# Patient Record
Sex: MALE | Race: WHITE | Employment: FULL TIME | URBAN - METROPOLITAN AREA
[De-identification: names, ages, dates, MRNs, and addresses within clinical notes are randomized per-mention and may not be internally consistent; named-entity substitution may affect disease eponyms.]

---

## 2017-01-03 ENCOUNTER — TELEPHONE (OUTPATIENT)
Dept: FAMILY MEDICINE CLINIC | Age: 57
End: 2017-01-03

## 2017-01-06 ENCOUNTER — OFFICE VISIT (OUTPATIENT)
Dept: FAMILY MEDICINE CLINIC | Age: 57
End: 2017-01-06

## 2017-01-06 VITALS
SYSTOLIC BLOOD PRESSURE: 118 MMHG | OXYGEN SATURATION: 98 % | BODY MASS INDEX: 29.43 KG/M2 | WEIGHT: 217 LBS | DIASTOLIC BLOOD PRESSURE: 80 MMHG | HEART RATE: 88 BPM

## 2017-01-06 DIAGNOSIS — R10.30 LOWER ABDOMINAL PAIN: ICD-10-CM

## 2017-01-06 DIAGNOSIS — F41.9 ANXIETY: Primary | ICD-10-CM

## 2017-01-06 DIAGNOSIS — F51.01 PRIMARY INSOMNIA: ICD-10-CM

## 2017-01-06 PROCEDURE — 99214 OFFICE O/P EST MOD 30 MIN: CPT | Performed by: FAMILY MEDICINE

## 2017-01-06 ASSESSMENT — PATIENT HEALTH QUESTIONNAIRE - PHQ9
SUM OF ALL RESPONSES TO PHQ QUESTIONS 1-9: 2
SUM OF ALL RESPONSES TO PHQ9 QUESTIONS 1 & 2: 2
1. LITTLE INTEREST OR PLEASURE IN DOING THINGS: 1
2. FEELING DOWN, DEPRESSED OR HOPELESS: 1

## 2017-01-06 ASSESSMENT — ENCOUNTER SYMPTOMS: RESPIRATORY NEGATIVE: 1

## 2017-01-09 ENCOUNTER — TELEPHONE (OUTPATIENT)
Dept: FAMILY MEDICINE CLINIC | Age: 57
End: 2017-01-09

## 2017-01-09 RX ORDER — DEXTROMETHORPHAN HYDROBROMIDE AND PROMETHAZINE HYDROCHLORIDE 15; 6.25 MG/5ML; MG/5ML
5 SYRUP ORAL 4 TIMES DAILY PRN
Qty: 180 ML | Refills: 0 | Status: SHIPPED | OUTPATIENT
Start: 2017-01-09 | End: 2017-01-16

## 2017-01-11 ENCOUNTER — TELEPHONE (OUTPATIENT)
Dept: FAMILY MEDICINE CLINIC | Age: 57
End: 2017-01-11

## 2017-01-12 ENCOUNTER — TELEPHONE (OUTPATIENT)
Dept: FAMILY MEDICINE CLINIC | Age: 57
End: 2017-01-12

## 2017-01-12 DIAGNOSIS — R10.30 LOWER ABDOMINAL PAIN: Primary | ICD-10-CM

## 2017-01-13 DIAGNOSIS — R10.30 LOWER ABDOMINAL PAIN: Primary | ICD-10-CM

## 2017-01-30 RX ORDER — GLIPIZIDE 5 MG/1
TABLET ORAL
Qty: 270 TABLET | Refills: 0 | Status: SHIPPED | OUTPATIENT
Start: 2017-01-30 | End: 2017-02-27 | Stop reason: SDUPTHER

## 2017-01-30 RX ORDER — DIAZEPAM 10 MG/1
TABLET ORAL
Qty: 40 TABLET | Refills: 1 | Status: SHIPPED | OUTPATIENT
Start: 2017-01-30 | End: 2017-04-22 | Stop reason: SDUPTHER

## 2017-01-31 ENCOUNTER — TELEPHONE (OUTPATIENT)
Dept: FAMILY MEDICINE CLINIC | Age: 57
End: 2017-01-31

## 2017-01-31 RX ORDER — AZITHROMYCIN 250 MG/1
TABLET, FILM COATED ORAL
Qty: 6 TABLET | Refills: 0 | Status: SHIPPED | OUTPATIENT
Start: 2017-01-31 | End: 2017-02-10 | Stop reason: ALTCHOICE

## 2017-02-01 ENCOUNTER — TELEPHONE (OUTPATIENT)
Dept: FAMILY MEDICINE CLINIC | Age: 57
End: 2017-02-01

## 2017-02-01 DIAGNOSIS — R10.84 GENERALIZED ABDOMINAL PAIN: Primary | ICD-10-CM

## 2017-02-03 ENCOUNTER — HOSPITAL ENCOUNTER (OUTPATIENT)
Dept: GENERAL RADIOLOGY | Age: 57
Discharge: HOME | End: 2017-02-03
Attending: FAMILY MEDICINE | Admitting: FAMILY MEDICINE

## 2017-02-03 ENCOUNTER — HOSPITAL ENCOUNTER (OUTPATIENT)
Dept: OTHER | Age: 57
Discharge: HOME | End: 2017-02-03
Attending: FAMILY MEDICINE | Admitting: FAMILY MEDICINE

## 2017-02-03 DIAGNOSIS — R10.84 GENERALIZED ABDOMINAL PAIN: ICD-10-CM

## 2017-02-03 DIAGNOSIS — R10.84 ABDOMINAL PAIN, GENERALIZED: ICD-10-CM

## 2017-02-03 DIAGNOSIS — R93.3 ABNORMAL SMALL BOWEL X-RAY: Primary | ICD-10-CM

## 2017-02-06 LAB
CELIAC PANEL: 25 UNITS (ref 0–19)
TISSUE TRANSGLUTAMINASE IGA: 2 U/ML (ref 0–3)

## 2017-02-07 LAB
GLIADIN ANTIBODIES IGA: 6 UNITS (ref 0–19)
GLIADIN ANTIBODIES IGG: 3 UNITS (ref 0–19)
TISSUE TRANSGLUTAMINASE ANTIBODY: 15 U/ML (ref 0–5)

## 2017-02-10 ENCOUNTER — HOSPITAL ENCOUNTER (OUTPATIENT)
Dept: OTHER | Age: 57
Discharge: HOME | End: 2017-02-10
Attending: INTERNAL MEDICINE | Admitting: INTERNAL MEDICINE

## 2017-02-10 VITALS
SYSTOLIC BLOOD PRESSURE: 129 MMHG | RESPIRATION RATE: 16 BRPM | HEART RATE: 74 BPM | WEIGHT: 215 LBS | BODY MASS INDEX: 29.12 KG/M2 | OXYGEN SATURATION: 95 % | TEMPERATURE: 97.2 F | DIASTOLIC BLOOD PRESSURE: 79 MMHG | HEIGHT: 72 IN

## 2017-02-10 LAB
GLUCOSE BLD-MCNC: 120 MG/DL (ref 70–99)
PERFORMED ON: ABNORMAL

## 2017-02-10 RX ORDER — SODIUM CHLORIDE, SODIUM LACTATE, POTASSIUM CHLORIDE, CALCIUM CHLORIDE 600; 310; 30; 20 MG/100ML; MG/100ML; MG/100ML; MG/100ML
INJECTION, SOLUTION INTRAVENOUS ONCE
Status: COMPLETED | OUTPATIENT
Start: 2017-02-10 | End: 2017-02-10

## 2017-02-10 RX ADMIN — SODIUM CHLORIDE, SODIUM LACTATE, POTASSIUM CHLORIDE, CALCIUM CHLORIDE: 600; 310; 30; 20 INJECTION, SOLUTION INTRAVENOUS at 12:57

## 2017-02-10 ASSESSMENT — PAIN - FUNCTIONAL ASSESSMENT: PAIN_FUNCTIONAL_ASSESSMENT: 0-10

## 2017-02-10 ASSESSMENT — PAIN SCALES - GENERAL
PAINLEVEL_OUTOF10: 0

## 2017-02-10 ASSESSMENT — PAIN DESCRIPTION - DESCRIPTORS: DESCRIPTORS: ACHING

## 2017-02-27 RX ORDER — GLIPIZIDE 5 MG/1
TABLET ORAL
Qty: 270 TABLET | Refills: 0 | Status: SHIPPED | OUTPATIENT
Start: 2017-02-27 | End: 2017-04-08 | Stop reason: SDUPTHER

## 2017-03-17 ENCOUNTER — OFFICE VISIT (OUTPATIENT)
Dept: FAMILY MEDICINE CLINIC | Age: 57
End: 2017-03-17

## 2017-03-17 VITALS
SYSTOLIC BLOOD PRESSURE: 135 MMHG | OXYGEN SATURATION: 97 % | BODY MASS INDEX: 30.61 KG/M2 | HEIGHT: 72 IN | WEIGHT: 226 LBS | DIASTOLIC BLOOD PRESSURE: 80 MMHG | HEART RATE: 84 BPM

## 2017-03-17 DIAGNOSIS — R10.84 GENERALIZED ABDOMINAL PAIN: ICD-10-CM

## 2017-03-17 DIAGNOSIS — K29.50 CHRONIC GASTRITIS WITHOUT BLEEDING, UNSPECIFIED GASTRITIS TYPE: Primary | ICD-10-CM

## 2017-03-17 PROCEDURE — 99214 OFFICE O/P EST MOD 30 MIN: CPT | Performed by: FAMILY MEDICINE

## 2017-03-17 ASSESSMENT — ENCOUNTER SYMPTOMS
RESPIRATORY NEGATIVE: 1
ABDOMINAL PAIN: 1

## 2017-04-10 RX ORDER — GLIPIZIDE 5 MG/1
TABLET ORAL
Qty: 270 TABLET | Refills: 0 | Status: SHIPPED | OUTPATIENT
Start: 2017-04-10 | End: 2017-05-14 | Stop reason: SDUPTHER

## 2017-04-24 RX ORDER — DIAZEPAM 10 MG/1
TABLET ORAL
Qty: 40 TABLET | Refills: 1 | Status: SHIPPED | OUTPATIENT
Start: 2017-04-24 | End: 2017-07-06 | Stop reason: SDUPTHER

## 2017-05-15 RX ORDER — GLIPIZIDE 5 MG/1
TABLET ORAL
Qty: 270 TABLET | Refills: 0 | Status: SHIPPED | OUTPATIENT
Start: 2017-05-15 | End: 2017-08-10 | Stop reason: SDUPTHER

## 2017-05-18 ENCOUNTER — TELEPHONE (OUTPATIENT)
Dept: FAMILY MEDICINE CLINIC | Age: 57
End: 2017-05-18

## 2017-05-18 RX ORDER — PREDNISONE 20 MG/1
20 TABLET ORAL 2 TIMES DAILY
Qty: 10 TABLET | Refills: 0 | Status: SHIPPED | OUTPATIENT
Start: 2017-05-18 | End: 2017-05-19 | Stop reason: SDUPTHER

## 2017-05-18 RX ORDER — CYCLOBENZAPRINE HCL 10 MG
10 TABLET ORAL 3 TIMES DAILY PRN
Qty: 30 TABLET | Refills: 1 | Status: SHIPPED | OUTPATIENT
Start: 2017-05-18 | End: 2017-05-28

## 2017-05-19 RX ORDER — PREDNISONE 20 MG/1
20 TABLET ORAL 2 TIMES DAILY
Qty: 10 TABLET | Refills: 0 | Status: SHIPPED | OUTPATIENT
Start: 2017-05-19 | End: 2017-06-19 | Stop reason: ALTCHOICE

## 2017-05-30 DIAGNOSIS — R05.9 COUGH: ICD-10-CM

## 2017-06-01 RX ORDER — DIAZEPAM 10 MG/1
TABLET ORAL
Qty: 40 TABLET | Refills: 1 | OUTPATIENT
Start: 2017-06-01

## 2017-06-19 ENCOUNTER — OFFICE VISIT (OUTPATIENT)
Dept: DERMATOLOGY | Age: 57
End: 2017-06-19

## 2017-06-19 DIAGNOSIS — L57.0 ACTINIC KERATOSES: Primary | ICD-10-CM

## 2017-06-19 PROCEDURE — 17003 DESTRUCT PREMALG LES 2-14: CPT | Performed by: DERMATOLOGY

## 2017-06-19 PROCEDURE — 17000 DESTRUCT PREMALG LESION: CPT | Performed by: DERMATOLOGY

## 2017-06-23 ENCOUNTER — OFFICE VISIT (OUTPATIENT)
Dept: FAMILY MEDICINE CLINIC | Age: 57
End: 2017-06-23

## 2017-06-23 VITALS
OXYGEN SATURATION: 98 % | BODY MASS INDEX: 31.83 KG/M2 | HEART RATE: 74 BPM | SYSTOLIC BLOOD PRESSURE: 118 MMHG | HEIGHT: 72 IN | WEIGHT: 235 LBS | DIASTOLIC BLOOD PRESSURE: 70 MMHG

## 2017-06-23 DIAGNOSIS — E11.9 TYPE 2 DIABETES MELLITUS WITHOUT COMPLICATION, UNSPECIFIED LONG TERM INSULIN USE STATUS: Primary | ICD-10-CM

## 2017-06-23 DIAGNOSIS — I10 ESSENTIAL HYPERTENSION: ICD-10-CM

## 2017-06-23 DIAGNOSIS — R63.5 ABNORMAL WEIGHT GAIN: ICD-10-CM

## 2017-06-23 DIAGNOSIS — G89.29 CHRONIC BILATERAL LOW BACK PAIN WITH BILATERAL SCIATICA: ICD-10-CM

## 2017-06-23 DIAGNOSIS — M54.41 CHRONIC BILATERAL LOW BACK PAIN WITH BILATERAL SCIATICA: ICD-10-CM

## 2017-06-23 DIAGNOSIS — H61.899 DEBRIS IN EAR CANAL: ICD-10-CM

## 2017-06-23 DIAGNOSIS — H69.82 ETD (EUSTACHIAN TUBE DYSFUNCTION), LEFT: ICD-10-CM

## 2017-06-23 DIAGNOSIS — M54.42 CHRONIC BILATERAL LOW BACK PAIN WITH BILATERAL SCIATICA: ICD-10-CM

## 2017-06-23 LAB
HBA1C MFR BLD: 9.2 %
T4 FREE: 1.3 NG/DL (ref 0.9–1.8)
TSH SERPL DL<=0.05 MIU/L-ACNC: 0.76 UIU/ML (ref 0.27–4.2)

## 2017-06-23 PROCEDURE — 83036 HEMOGLOBIN GLYCOSYLATED A1C: CPT | Performed by: FAMILY MEDICINE

## 2017-06-23 PROCEDURE — 99214 OFFICE O/P EST MOD 30 MIN: CPT | Performed by: FAMILY MEDICINE

## 2017-06-23 PROCEDURE — 36415 COLL VENOUS BLD VENIPUNCTURE: CPT | Performed by: FAMILY MEDICINE

## 2017-06-23 RX ORDER — METHYLPREDNISOLONE 4 MG/1
TABLET ORAL
Qty: 21 TABLET | Refills: 0 | Status: SHIPPED | OUTPATIENT
Start: 2017-06-23 | End: 2017-06-29

## 2017-06-23 ASSESSMENT — ENCOUNTER SYMPTOMS
RESPIRATORY NEGATIVE: 1
BACK PAIN: 1

## 2017-07-05 ENCOUNTER — TELEPHONE (OUTPATIENT)
Dept: FAMILY MEDICINE CLINIC | Age: 57
End: 2017-07-05

## 2017-07-05 NOTE — TELEPHONE ENCOUNTER
Pt has fluid collection in his abdomen wants to have a test done to see if fluid amount is increasing

## 2017-07-14 ENCOUNTER — HOSPITAL ENCOUNTER (OUTPATIENT)
Dept: CT IMAGING | Age: 57
Discharge: HOME | End: 2017-07-14
Attending: INTERNAL MEDICINE | Admitting: INTERNAL MEDICINE

## 2017-07-14 DIAGNOSIS — R10.84 ABDOMINAL PAIN, GENERALIZED: ICD-10-CM

## 2017-07-14 DIAGNOSIS — R10.84 GENERALIZED ABDOMINAL PAIN: ICD-10-CM

## 2017-07-14 LAB
CREAT SERPL-MCNC: 1.1 MG/DL (ref 0.9–1.3)
GFR AFRICAN AMERICAN: >60
GFR NON-AFRICAN AMERICAN: >60

## 2017-07-24 ENCOUNTER — TELEPHONE (OUTPATIENT)
Dept: FAMILY MEDICINE CLINIC | Age: 57
End: 2017-07-24

## 2017-07-24 RX ORDER — BLOOD SUGAR DIAGNOSTIC
STRIP MISCELLANEOUS
Qty: 300 STRIP | Refills: 2 | Status: SHIPPED | OUTPATIENT
Start: 2017-07-24 | End: 2017-12-29 | Stop reason: SDUPTHER

## 2017-07-24 RX ORDER — PEN NEEDLE, DIABETIC 31 GX5/16"
NEEDLE, DISPOSABLE MISCELLANEOUS
Qty: 100 EACH | Refills: 5 | Status: SHIPPED | OUTPATIENT
Start: 2017-07-24 | End: 2018-05-14 | Stop reason: SDUPTHER

## 2017-07-24 RX ORDER — LANCETS
6 EACH MISCELLANEOUS
Qty: 410 EACH | Refills: 3 | Status: SHIPPED | OUTPATIENT
Start: 2017-07-24 | End: 2017-12-29 | Stop reason: SDUPTHER

## 2017-07-25 ENCOUNTER — TELEPHONE (OUTPATIENT)
Dept: FAMILY MEDICINE CLINIC | Age: 57
End: 2017-07-25

## 2017-07-26 ENCOUNTER — TELEPHONE (OUTPATIENT)
Dept: FAMILY MEDICINE CLINIC | Age: 57
End: 2017-07-26

## 2017-08-10 RX ORDER — GLIPIZIDE 5 MG/1
TABLET ORAL
Qty: 270 TABLET | Refills: 0 | Status: SHIPPED | OUTPATIENT
Start: 2017-08-10 | End: 2017-11-01 | Stop reason: SDUPTHER

## 2017-08-10 RX ORDER — LISINOPRIL 20 MG/1
TABLET ORAL
Qty: 90 TABLET | Refills: 0 | Status: SHIPPED | OUTPATIENT
Start: 2017-08-10 | End: 2017-11-01 | Stop reason: SDUPTHER

## 2017-08-11 RX ORDER — DIAZEPAM 10 MG/1
TABLET ORAL
Qty: 40 TABLET | Refills: 0 | Status: SHIPPED | OUTPATIENT
Start: 2017-08-11 | End: 2017-09-25 | Stop reason: SDUPTHER

## 2017-09-22 ENCOUNTER — OFFICE VISIT (OUTPATIENT)
Dept: FAMILY MEDICINE CLINIC | Age: 57
End: 2017-09-22

## 2017-09-22 VITALS
WEIGHT: 248 LBS | HEART RATE: 80 BPM | HEIGHT: 72 IN | DIASTOLIC BLOOD PRESSURE: 70 MMHG | OXYGEN SATURATION: 97 % | BODY MASS INDEX: 33.59 KG/M2 | SYSTOLIC BLOOD PRESSURE: 120 MMHG

## 2017-09-22 DIAGNOSIS — E11.9 TYPE 2 DIABETES MELLITUS WITHOUT COMPLICATION, UNSPECIFIED LONG TERM INSULIN USE STATUS: Primary | ICD-10-CM

## 2017-09-22 DIAGNOSIS — I10 ESSENTIAL HYPERTENSION: ICD-10-CM

## 2017-09-22 LAB — HBA1C MFR BLD: 8.1 %

## 2017-09-22 PROCEDURE — 83036 HEMOGLOBIN GLYCOSYLATED A1C: CPT | Performed by: FAMILY MEDICINE

## 2017-09-22 PROCEDURE — 99214 OFFICE O/P EST MOD 30 MIN: CPT | Performed by: FAMILY MEDICINE

## 2017-09-22 ASSESSMENT — ENCOUNTER SYMPTOMS
ABDOMINAL DISTENTION: 1
RESPIRATORY NEGATIVE: 1

## 2017-09-26 RX ORDER — DIAZEPAM 10 MG/1
TABLET ORAL
Qty: 40 TABLET | Refills: 0 | Status: SHIPPED | OUTPATIENT
Start: 2017-09-26 | End: 2017-10-30 | Stop reason: SDUPTHER

## 2017-10-13 ENCOUNTER — OFFICE VISIT (OUTPATIENT)
Dept: CARDIOLOGY CLINIC | Age: 57
End: 2017-10-13

## 2017-10-13 VITALS
SYSTOLIC BLOOD PRESSURE: 120 MMHG | OXYGEN SATURATION: 95 % | HEART RATE: 88 BPM | BODY MASS INDEX: 33.46 KG/M2 | WEIGHT: 247 LBS | DIASTOLIC BLOOD PRESSURE: 80 MMHG | HEIGHT: 72 IN

## 2017-10-13 DIAGNOSIS — I25.10 CORONARY ARTERY DISEASE INVOLVING NATIVE CORONARY ARTERY OF NATIVE HEART WITHOUT ANGINA PECTORIS: Primary | ICD-10-CM

## 2017-10-13 DIAGNOSIS — E78.2 MIXED HYPERLIPIDEMIA: ICD-10-CM

## 2017-10-13 DIAGNOSIS — R07.9 CHEST PAIN, UNSPECIFIED TYPE: ICD-10-CM

## 2017-10-13 PROCEDURE — 99213 OFFICE O/P EST LOW 20 MIN: CPT | Performed by: INTERNAL MEDICINE

## 2017-10-13 RX ORDER — PANTOPRAZOLE SODIUM 40 MG/1
TABLET, DELAYED RELEASE ORAL
Refills: 2 | COMMUNITY
Start: 2017-10-05 | End: 2018-01-29 | Stop reason: SDUPTHER

## 2017-10-13 RX ORDER — ASPIRIN 81 MG/1
81 TABLET, CHEWABLE ORAL DAILY
COMMUNITY

## 2017-10-13 RX ORDER — DICYCLOMINE HYDROCHLORIDE 10 MG/1
CAPSULE ORAL
COMMUNITY
Start: 2017-09-28

## 2017-10-13 NOTE — COMMUNICATION BODY
History of present illness on initial date of evaluation:                        Howard Lackey is a 59 y.o. patient who presents for follow up. Today he states he had several surgeries for hernia repair. He has not been watching his diet in an attempt to loose weight. He has been gaining weight despite this. He has also been exercising on his treadmill. He states he also fell at work and injured his back. He states he does have a slight pain in his chest area at times. This does not occur with activity. Assessment:  59 y.o. patient with:  1. Chest pain                        ~non-cardiac                         ~resolved  2. Abnormal stress test                        ~Atypical chest pain with multiple other complaints including left sided facial pain, left arm pain, left leg pain, and left sided body pain                        ~Cath April 28, 2015 Mild nonobstructive coronary artery disease with preserved left ventricular function   3. Abnormal EKG with RBBB  4. Hyperlipidemia                        ~on lipitor     Plan:  1. The patient was seen for >25 minutes. >50% of the time was devoted to giving the patient detailed instructions instructions on addressing diet, regular exercise, weight control, smoking abstention, medication compliance, and stress minimization. The patient was provided written and verbal instructions regarding risk factor modification. 2. Follow up with  Alvaro Ruiz DO   3.  Follow up with me in 1 year for risk assessment

## 2017-10-13 NOTE — PROGRESS NOTES
complication, not stated as uncontrolled; and Umbilical hernia. Surgical History:   has a past surgical history that includes Colon surgery; Abdominal adhesion surgery; Tonsillectomy; Colonoscopy; other surgical history (9-26-12); Throat surgery (Bilateral, october 2013); Cataract removal with implant (Left, 11/13/15); hernia repair; and Upper gastrointestinal endoscopy (02/10/2017). Social History:   reports that he quit smoking about 37 years ago. He has never used smokeless tobacco. He reports that he does not drink alcohol or use drugs. Family History:  No evidence for sudden cardiac death or premature CAD    Medications:  Reviewed and are listed in nursing record.  and/or listed below  Current Outpatient Prescriptions on File Prior to Visit   Medication Sig Dispense Refill    diazepam (VALIUM) 10 MG tablet TAKE 1 AND 1/2 TABLETS BY MOUTH DAILY *TO LAST 30 DAYS* 40 tablet 0    NOVOLOG FLEXPEN 100 UNIT/ML injection pen INJECT UP TO 15 UNITS TWICE DAILY PER SLIDING SCALE 15 Pen 2    lisinopril (PRINIVIL;ZESTRIL) 20 MG tablet TAKE 1 TABLET BY MOUTH EVERY DAY (Patient taking differently: 1/2 tab) 90 tablet 0    glipiZIDE (GLUCOTROL) 5 MG tablet TAKE 1 AND 1/2 TABLETS BY MOUTH TWICE A  tablet 0    metFORMIN (GLUCOPHAGE) 1000 MG tablet TAKE 1 TABLET BY MOUTH TWICE A  tablet 0    LANTUS SOLOSTAR 100 UNIT/ML injection pen INJECT 70 UNITS INTO THE SKIN NIGHTLY 45 Pen 2    atorvastatin (LIPITOR) 40 MG tablet TAKE 1 TABLET BY MOUTH EVERY DAY (Patient taking differently: TAKE 1/2 TABLET BY MOUTH EVERY DAY) 90 tablet 1    ACCU-CHEK AMA PLUS strip USE AS DIRECTED UP TO 3 TIMES A  strip 2    B-D UF III MINI PEN NEEDLES 31G X 5 MM MISC USE UP TO 3 TIMES DAILY 100 each 5    ACCU-CHEK FASTCLIX LANCETS MISC 6 Sticks by Does not apply route 5 times daily DX: E11.9 410 each 3    Blood Glucose Monitoring Suppl (ACCU-CHEK AMA PLUS) W/DEVICE KIT USE AS DIRECTED 1 kit 0     No current facility-administered medications on file prior to visit. Allergies:  Codeine; Dilaudid [hydromorphone hcl]; Fentanyl; and Toradol [ketorolac tromethamine]     Review of Systems:   Review of Systems:   All 14 point review of symptoms completed. Pertinent positives identified in the HPI, all other review of symptoms negative as below.     Review of Systems - History obtained from the patient  General ROS: negative for - chills, fever or night sweats  Psychological ROS: negative for - disorientation or hallucinations  Ophthalmic ROS: negative for - dry eyes, eye pain or loss of vision  ENT ROS: negative for - nasal discharge or sore throat  Allergy and Immunology ROS: negative for - hives or itchy/watery eyes  Hematological and Lymphatic ROS: negative for - jaundice or night sweats  Endocrine ROS: negative for - mood swings or temperature intolerance  Breast ROS: deferred  Respiratory ROS: negative for - hemoptysis or stridor  Cardiovascular ROS: negative for - chest pain, dyspnea on exertion or palpitations  Gastrointestinal ROS: no abdominal pain, change in bowel habits, or black or bloody stools  Genito-Urinary ROS: no dysuria, trouble voiding, or hematuria  Musculoskeletal ROS: negative for - gait disturbance, joint pain or joint stiffness  Neurological ROS: negative for - seizures or speech problems  Dermatological ROS: negative for - rash or skin lesion changes      Physical Examination:    Vitals:    10/13/17 0815   BP: 120/80   Pulse: 88   SpO2: 95%    Weight: 247 lb (112 kg)     Wt Readings from Last 3 Encounters:   10/13/17 247 lb (112 kg)   09/22/17 248 lb (112.5 kg)   06/23/17 235 lb (106.6 kg)     No intake or output data in the 24 hours ending 10/13/17 0835    General Appearance:  Alert, cooperative, no distress, appears stated age   Head:  Normocephalic, without obvious abnormality, atraumatic   Eyes:  PERRL, conjunctiva/corneas clear       Nose: Nares normal, no drainage or sinus tenderness DO Edy   3. Follow up with me in 1 year for risk assessment    All questions and concerns were addressed to the patient/family. Alternatives to my treatment were discussed. The note was completed using EMR. Every effort was made to ensure accuracy; however, inadvertent computerized transcription errors may be present.     Merline Renshaw, MD, [address], [place], [place]  [phone] [facility] office  [phone] [facility]  10/13/2017  8:35 AM

## 2017-10-24 RX ORDER — INSULIN GLARGINE 100 [IU]/ML
70 INJECTION, SOLUTION SUBCUTANEOUS NIGHTLY
Qty: 3 PEN | Refills: 2 | Status: SHIPPED | OUTPATIENT
Start: 2017-10-24 | End: 2017-10-24 | Stop reason: SDUPTHER

## 2017-11-01 NOTE — TELEPHONE ENCOUNTER
Last office visit 9/22/2017     Last written 8/10/17     Next office visit scheduled 12/29/2017    Requested Prescriptions     Pending Prescriptions Disp Refills    lisinopril (PRINIVIL;ZESTRIL) 20 MG tablet [Pharmacy Med Name: LISINOPRIL 20 MG TABLET] 90 tablet 0     Sig: TAKE 1 TABLET BY MOUTH EVERY DAY    glipiZIDE (GLUCOTROL) 5 MG tablet [Pharmacy Med Name: GLIPIZIDE 5 MG TABLET] 270 tablet 0     Sig: TAKE 1 AND 1/2 TABLETS BY MOUTH TWICE A DAY

## 2017-11-02 RX ORDER — DIAZEPAM 10 MG/1
TABLET ORAL
Qty: 40 TABLET | Refills: 0 | Status: SHIPPED | OUTPATIENT
Start: 2017-11-02 | End: 2017-12-08 | Stop reason: SDUPTHER

## 2017-11-02 RX ORDER — LISINOPRIL 20 MG/1
TABLET ORAL
Qty: 90 TABLET | Refills: 0 | Status: SHIPPED | OUTPATIENT
Start: 2017-11-02 | End: 2018-05-14 | Stop reason: SDUPTHER

## 2017-11-02 RX ORDER — GLIPIZIDE 5 MG/1
TABLET ORAL
Qty: 270 TABLET | Refills: 0 | Status: SHIPPED | OUTPATIENT
Start: 2017-11-02 | End: 2018-01-29 | Stop reason: SDUPTHER

## 2017-11-06 NOTE — TELEPHONE ENCOUNTER
Last office visit 9/22/2017     Last written 8/3/17     Next office visit scheduled 12/29/2017    Requested Prescriptions     Pending Prescriptions Disp Refills    metFORMIN (GLUCOPHAGE) 1000 MG tablet [Pharmacy Med Name: METFORMIN HCL 1,000 MG TABLET] 180 tablet 0     Sig: TAKE 1 TABLET BY MOUTH TWICE A DAY

## 2017-11-22 ENCOUNTER — TELEPHONE (OUTPATIENT)
Dept: FAMILY MEDICINE CLINIC | Age: 57
End: 2017-11-22

## 2017-11-22 RX ORDER — AZITHROMYCIN 250 MG/1
TABLET, FILM COATED ORAL
Qty: 6 TABLET | Refills: 0 | Status: SHIPPED | OUTPATIENT
Start: 2017-11-22 | End: 2017-12-02

## 2017-11-22 NOTE — TELEPHONE ENCOUNTER
He called in saying he has a sore throat chest congestion heavy lung he said he has had this for 3 days now.     He is going to [facility] tomorrow for 2 [employer]

## 2017-12-11 RX ORDER — DIAZEPAM 10 MG/1
TABLET ORAL
Qty: 40 TABLET | Refills: 0 | Status: SHIPPED | OUTPATIENT
Start: 2017-12-11 | End: 2018-01-15 | Stop reason: SDUPTHER

## 2017-12-11 NOTE — TELEPHONE ENCOUNTER
Last Seen: 9/22/2017    Last Writen: 11/2/17    Last UDS: n/a    OARRS Run On: 12/11/17    Med Agreement Signed On: n/a    Next Appointment: 12/29/2017    Requested Prescriptions     Pending Prescriptions Disp Refills    diazepam (VALIUM) 10 MG tablet [Pharmacy Med Name: DIAZEPAM 10 MG TABLET] 40 tablet 0     Sig: TAKE 1 AND 1/2 TABLETS BY MOUTH DAILY *TO LAST 30 DAYS*

## 2017-12-20 PROBLEM — R42 VERTIGO: Status: ACTIVE | Noted: 2017-12-20

## 2017-12-22 ENCOUNTER — OFFICE VISIT (OUTPATIENT)
Dept: FAMILY MEDICINE CLINIC | Age: 57
End: 2017-12-22

## 2017-12-22 ENCOUNTER — TELEPHONE (OUTPATIENT)
Dept: FAMILY MEDICINE CLINIC | Age: 57
End: 2017-12-22

## 2017-12-22 VITALS
HEART RATE: 90 BPM | WEIGHT: 241 LBS | BODY MASS INDEX: 32.69 KG/M2 | OXYGEN SATURATION: 97 % | DIASTOLIC BLOOD PRESSURE: 84 MMHG | SYSTOLIC BLOOD PRESSURE: 122 MMHG

## 2017-12-22 DIAGNOSIS — H66.91 RIGHT OTITIS MEDIA, UNSPECIFIED OTITIS MEDIA TYPE: Primary | ICD-10-CM

## 2017-12-22 PROCEDURE — 1036F TOBACCO NON-USER: CPT | Performed by: FAMILY MEDICINE

## 2017-12-22 PROCEDURE — G8427 DOCREV CUR MEDS BY ELIG CLIN: HCPCS | Performed by: FAMILY MEDICINE

## 2017-12-22 PROCEDURE — 3017F COLORECTAL CA SCREEN DOC REV: CPT | Performed by: FAMILY MEDICINE

## 2017-12-22 PROCEDURE — G8484 FLU IMMUNIZE NO ADMIN: HCPCS | Performed by: FAMILY MEDICINE

## 2017-12-22 PROCEDURE — 1111F DSCHRG MED/CURRENT MED MERGE: CPT | Performed by: FAMILY MEDICINE

## 2017-12-22 PROCEDURE — G8417 CALC BMI ABV UP PARAM F/U: HCPCS | Performed by: FAMILY MEDICINE

## 2017-12-22 PROCEDURE — 99214 OFFICE O/P EST MOD 30 MIN: CPT | Performed by: FAMILY MEDICINE

## 2017-12-22 PROCEDURE — G8598 ASA/ANTIPLAT THER USED: HCPCS | Performed by: FAMILY MEDICINE

## 2017-12-22 RX ORDER — AMOXICILLIN 875 MG/1
875 TABLET, COATED ORAL 2 TIMES DAILY
Qty: 20 TABLET | Refills: 0 | Status: SHIPPED | OUTPATIENT
Start: 2017-12-22 | End: 2017-12-22 | Stop reason: SDUPTHER

## 2017-12-22 RX ORDER — AMOXICILLIN 875 MG/1
875 TABLET, COATED ORAL 2 TIMES DAILY
Qty: 20 TABLET | Refills: 0 | Status: SHIPPED | OUTPATIENT
Start: 2017-12-22 | End: 2018-01-01

## 2017-12-24 ASSESSMENT — ENCOUNTER SYMPTOMS
DIARRHEA: 0
ABDOMINAL PAIN: 0
COUGH: 0
SORE THROAT: 0
RHINORRHEA: 0

## 2017-12-24 NOTE — PROGRESS NOTES
Subjective:      Patient ID: Eleazar De is a 59 y.o. male. 59 y.o. Pt of Dr Teetee Van presents for a cc of: Patient presents with:  Otalgia: left     Pt was recently hospitalized for persistent vertigo (better now) and states that no one at the hospital ever looked at hs ear and he is prone to wax impaction         Otalgia    There is pain in the left ear. This is a new problem. The current episode started in the past 7 days. The problem occurs constantly. The problem has been unchanged. There has been no fever. The pain is moderate. Pertinent negatives include no abdominal pain, coughing, diarrhea, ear discharge, headaches, hearing loss, neck pain, rash, rhinorrhea or sore throat. He has tried nothing for the symptoms. The treatment provided no relief. Review of Systems   Constitutional: Negative for fever. HENT: Positive for ear pain. Negative for ear discharge, hearing loss, rhinorrhea and sore throat. Respiratory: Negative for cough. Gastrointestinal: Negative for abdominal pain and diarrhea. Musculoskeletal: Negative for neck pain. Skin: Negative for rash. Neurological: Positive for dizziness. Negative for headaches. /84 (Site: Left Arm, Position: Sitting)   Pulse 90   Wt 241 lb (109.3 kg)   SpO2 97%   BMI 32.69 kg/m²    Objective:   Physical Exam   Constitutional: He appears well-developed and well-nourished. No distress. HENT:   Head: Normocephalic and atraumatic. Mouth/Throat: No oropharyngeal exudate. Mild wax. (removed with irrigation at pt's request)  There is tm erythema and distension and mild external canal swelling and dc   Eyes: Conjunctivae and EOM are normal. Right eye exhibits no discharge. Left eye exhibits no discharge. No scleral icterus. Neck: Normal range of motion. Neck supple. No tracheal deviation present. No thyromegaly present. Cardiovascular: Normal rate, regular rhythm and normal heart sounds. Exam reveals no gallop and no friction rub. No murmur heard. Pulmonary/Chest: Effort normal and breath sounds normal. No respiratory distress. He has no wheezes. He has no rales. He exhibits no tenderness. Lymphadenopathy:     He has no cervical adenopathy. Skin: No rash noted. He is not diaphoretic. Nursing note and vitals reviewed. Assessment:      Ear pain       Plan:      Treat with amox and ear drops    Hugo Lombardo was seen today for otalgia. Diagnoses and all orders for this visit:    Right otitis media, unspecified otitis media type    Other orders  -     Discontinue: neomycin-polymyxin-hydrocortisone (CORTISPORIN) 3.5-88448-7 otic solution; Place 3 drops in ear(s) 3 times daily for 10 days  -     Discontinue: amoxicillin (AMOXIL) 875 MG tablet; Take 1 tablet by mouth 2 times daily for 10 days  -     neomycin-polymyxin-hydrocortisone (CORTISPORIN) 3.5-41361-2 otic solution; Place 3 drops in ear(s) 3 times daily for 10 days  -     amoxicillin (AMOXIL) 875 MG tablet;  Take 1 tablet by mouth 2 times daily for 10 days

## 2017-12-29 ENCOUNTER — OFFICE VISIT (OUTPATIENT)
Dept: FAMILY MEDICINE CLINIC | Age: 57
End: 2017-12-29

## 2017-12-29 VITALS
HEIGHT: 72 IN | SYSTOLIC BLOOD PRESSURE: 126 MMHG | WEIGHT: 246 LBS | HEART RATE: 91 BPM | OXYGEN SATURATION: 96 % | BODY MASS INDEX: 33.32 KG/M2 | DIASTOLIC BLOOD PRESSURE: 82 MMHG

## 2017-12-29 DIAGNOSIS — M54.41 CHRONIC BILATERAL LOW BACK PAIN WITH BILATERAL SCIATICA: ICD-10-CM

## 2017-12-29 DIAGNOSIS — G89.29 CHRONIC BILATERAL LOW BACK PAIN WITH BILATERAL SCIATICA: ICD-10-CM

## 2017-12-29 DIAGNOSIS — M72.2 PLANTAR FASCIITIS OF LEFT FOOT: ICD-10-CM

## 2017-12-29 DIAGNOSIS — M54.42 CHRONIC BILATERAL LOW BACK PAIN WITH BILATERAL SCIATICA: ICD-10-CM

## 2017-12-29 DIAGNOSIS — E11.9 TYPE 2 DIABETES MELLITUS WITHOUT COMPLICATION, UNSPECIFIED LONG TERM INSULIN USE STATUS: Primary | ICD-10-CM

## 2017-12-29 DIAGNOSIS — I10 ESSENTIAL HYPERTENSION: ICD-10-CM

## 2017-12-29 PROCEDURE — 1111F DSCHRG MED/CURRENT MED MERGE: CPT | Performed by: FAMILY MEDICINE

## 2017-12-29 PROCEDURE — G8427 DOCREV CUR MEDS BY ELIG CLIN: HCPCS | Performed by: FAMILY MEDICINE

## 2017-12-29 PROCEDURE — G8417 CALC BMI ABV UP PARAM F/U: HCPCS | Performed by: FAMILY MEDICINE

## 2017-12-29 PROCEDURE — 3017F COLORECTAL CA SCREEN DOC REV: CPT | Performed by: FAMILY MEDICINE

## 2017-12-29 PROCEDURE — 3045F PR MOST RECENT HEMOGLOBIN A1C LEVEL 7.0-9.0%: CPT | Performed by: FAMILY MEDICINE

## 2017-12-29 PROCEDURE — 99214 OFFICE O/P EST MOD 30 MIN: CPT | Performed by: FAMILY MEDICINE

## 2017-12-29 PROCEDURE — 1036F TOBACCO NON-USER: CPT | Performed by: FAMILY MEDICINE

## 2017-12-29 PROCEDURE — G8598 ASA/ANTIPLAT THER USED: HCPCS | Performed by: FAMILY MEDICINE

## 2017-12-29 PROCEDURE — G8484 FLU IMMUNIZE NO ADMIN: HCPCS | Performed by: FAMILY MEDICINE

## 2017-12-29 RX ORDER — OXYCODONE HYDROCHLORIDE AND ACETAMINOPHEN 5; 325 MG/1; MG/1
1 TABLET ORAL 2 TIMES DAILY PRN
Qty: 30 TABLET | Refills: 0 | Status: SHIPPED | OUTPATIENT
Start: 2017-12-29 | End: 2018-01-28

## 2017-12-29 RX ORDER — LANCETS
6 EACH MISCELLANEOUS
Qty: 410 EACH | Refills: 3 | Status: SHIPPED | OUTPATIENT
Start: 2017-12-29 | End: 2018-05-14 | Stop reason: SDUPTHER

## 2017-12-29 ASSESSMENT — ENCOUNTER SYMPTOMS
RESPIRATORY NEGATIVE: 1
BACK PAIN: 1

## 2017-12-29 NOTE — PROGRESS NOTES
Subjective:      Patient ID: Nisha Romero is a 59 y.o. male. In for check on DM(last AIC 8.4 as IP)--HT(not checking)-Pain Lt heel-onset 5 days--LBP(old pain has flared)    Prior to Visit Medications :  Medication neomycin-polymyxin-hydrocortisone (CORTISPORIN) 3.5-15957-4 otic solution, Sig Place 3 drops in ear(s) 3 times daily for 10 days, Taking? Yes, Authorizing Provider Carolyn Davis MD    Medication amoxicillin (AMOXIL) 060 MG tablet, Sig Take 1 tablet by mouth 2 times daily for 10 days, Taking? Yes, Authorizing Provider Carolyn Davis MD    Medication meclizine (ANTIVERT) 25 MG tablet, Sig Take 1 tablet by mouth 3 times daily as needed for Dizziness, Taking? Yes, Authorizing Provider Cedric Cintron CNP    Medication diazepam (VALIUM) 10 MG tablet, Sig TAKE 1 AND 1/2 TABLETS BY MOUTH DAILY *TO LAST 30 DAYS*, Taking? Yes, Authorizing Provider Miguel Snow, DO    Medication insulin glargine (LANTUS SOLOSTAR) 100 UNIT/ML injection pen, Sig Inject 70 Units into the skin nightly, Taking? Yes, Authorizing Provider Miguel Snow, DO    Medication metFORMIN (GLUCOPHAGE) 1000 MG tablet, Sig TAKE 1 TABLET BY MOUTH TWICE A DAY, Taking? Yes, Authorizing Provider Miguel Snow, DO    Medication lisinopril (PRINIVIL;ZESTRIL) 20 MG tablet, Sig TAKE 1 TABLET BY MOUTH EVERY DAY, Taking? Yes, Authorizing Provider Miguel Snow, DO    Medication glipiZIDE (GLUCOTROL) 5 MG tablet, Sig TAKE 1 AND 1/2 TABLETS BY MOUTH TWICE A DAY, Taking? Yes, Authorizing Provider Miguel Snow, DO    Medication dicyclomine (BENTYL) 10 MG capsule, Sig , Taking? Yes, Authorizing Provider Kari Provider, MD    Medication pantoprazole (PROTONIX) 40 MG tablet, Sig TAKE 1 TABLET BY MOUTH DAILY, Taking? Yes, Authorizing Provider Historical Provider, MD    Medication aspirin 81 MG chewable tablet, Sig Take 81 mg by mouth daily, Taking?  Yes, Authorizing Provider Historical Provider, MD    Medication NOVOLOG FLEXPEN 100 UNIT/ML alert and oriented to person, place, and time. Assessment:      1. Type 2 diabetes mellitus without complication, unspecified long term insulin use status ([facility] [address]    2. Essential hypertension    3. Plantar fasciitis of left foot    4. Chronic bilateral low back pain with bilateral sciatica            Plan:      Love Hermosillo was seen today for diabetes, hypertension, foot pain and back pain.     Diagnoses and all orders for this visit:    Type 2 diabetes mellitus without complication, unspecified long term insulin use status ([facility])  -     Cancel: POCT glycosylated hemoglobin (Hb A1C)  Continue meds-lower carbs  Essential hypertension  Continue meds-JEFF diet  Plantar fasciitis of left foot  Ex's twice a day    Chronic bilateral low back pain with bilateral sciatica  Rx Perc 5  Other orders  -     glucose blood VI test strips (ACCU-CHEK AMA PLUS) strip; USE AS DIRECTED UP TO 3 TIMES A DAY  -     ACCU-CHEK FASTCLIX LANCETS MISC; 6 Sticks by Does not apply route 5 times daily DX: E11.9

## 2018-01-16 RX ORDER — DIAZEPAM 10 MG/1
TABLET ORAL
Qty: 40 TABLET | Refills: 0 | Status: SHIPPED | OUTPATIENT
Start: 2018-01-16 | End: 2018-02-17 | Stop reason: SDUPTHER

## 2018-01-18 ENCOUNTER — TELEPHONE (OUTPATIENT)
Dept: FAMILY MEDICINE CLINIC | Age: 58
End: 2018-01-18

## 2018-02-19 RX ORDER — DIAZEPAM 10 MG/1
TABLET ORAL
Qty: 40 TABLET | Refills: 0 | Status: SHIPPED | OUTPATIENT
Start: 2018-02-19 | End: 2018-03-17 | Stop reason: SDUPTHER

## 2018-03-21 ENCOUNTER — TELEPHONE (OUTPATIENT)
Dept: FAMILY MEDICINE CLINIC | Age: 58
End: 2018-03-21

## 2018-03-21 RX ORDER — DIAZEPAM 10 MG/1
TABLET ORAL
Qty: 40 TABLET | Refills: 0 | Status: SHIPPED | OUTPATIENT
Start: 2018-03-21 | End: 2018-05-01 | Stop reason: SDUPTHER

## 2018-04-23 ENCOUNTER — TELEPHONE (OUTPATIENT)
Dept: CARDIOLOGY CLINIC | Age: 58
End: 2018-04-23

## 2018-04-23 ENCOUNTER — TELEPHONE (OUTPATIENT)
Dept: FAMILY MEDICINE CLINIC | Age: 58
End: 2018-04-23

## 2018-04-27 ENCOUNTER — OFFICE VISIT (OUTPATIENT)
Dept: FAMILY MEDICINE CLINIC | Age: 58
End: 2018-04-27

## 2018-04-27 VITALS
DIASTOLIC BLOOD PRESSURE: 82 MMHG | HEART RATE: 82 BPM | BODY MASS INDEX: 34.4 KG/M2 | WEIGHT: 254 LBS | OXYGEN SATURATION: 97 % | HEIGHT: 72 IN | SYSTOLIC BLOOD PRESSURE: 135 MMHG

## 2018-04-27 DIAGNOSIS — E11.9 TYPE 2 DIABETES MELLITUS WITHOUT COMPLICATION, UNSPECIFIED LONG TERM INSULIN USE STATUS: Primary | ICD-10-CM

## 2018-04-27 DIAGNOSIS — I10 ESSENTIAL HYPERTENSION: ICD-10-CM

## 2018-04-27 LAB
CREATININE URINE POCT: 300
HBA1C MFR BLD: 8.7 %
MICROALBUMIN/CREAT 24H UR: 30 MG/G{CREAT}
MICROALBUMIN/CREAT UR-RTO: <30

## 2018-04-27 PROCEDURE — 83036 HEMOGLOBIN GLYCOSYLATED A1C: CPT | Performed by: FAMILY MEDICINE

## 2018-04-27 PROCEDURE — 99214 OFFICE O/P EST MOD 30 MIN: CPT | Performed by: FAMILY MEDICINE

## 2018-04-27 PROCEDURE — 82044 UR ALBUMIN SEMIQUANTITATIVE: CPT | Performed by: FAMILY MEDICINE

## 2018-04-27 RX ORDER — RIFAXIMIN 550 MG/1
TABLET ORAL 3 TIMES DAILY
Refills: 0 | COMMUNITY
Start: 2018-04-24 | End: 2018-07-27 | Stop reason: ALTCHOICE

## 2018-04-27 ASSESSMENT — PATIENT HEALTH QUESTIONNAIRE - PHQ9
SUM OF ALL RESPONSES TO PHQ9 QUESTIONS 1 & 2: 0
SUM OF ALL RESPONSES TO PHQ QUESTIONS 1-9: 0
1. LITTLE INTEREST OR PLEASURE IN DOING THINGS: 0
2. FEELING DOWN, DEPRESSED OR HOPELESS: 0

## 2018-04-27 ASSESSMENT — ENCOUNTER SYMPTOMS: BACK PAIN: 1

## 2018-05-16 RX ORDER — LISINOPRIL 20 MG/1
TABLET ORAL
Qty: 90 TABLET | Refills: 1 | Status: SHIPPED | OUTPATIENT
Start: 2018-05-16 | End: 2018-10-18 | Stop reason: SDUPTHER

## 2018-05-16 RX ORDER — GLIPIZIDE 5 MG/1
TABLET ORAL
Qty: 270 TABLET | Refills: 5 | Status: SHIPPED | OUTPATIENT
Start: 2018-05-16 | End: 2019-06-24 | Stop reason: SDUPTHER

## 2018-05-16 RX ORDER — LANCETS
6 EACH MISCELLANEOUS
Qty: 410 EACH | Refills: 3 | Status: SHIPPED | OUTPATIENT
Start: 2018-05-16 | End: 2018-09-17

## 2018-05-16 RX ORDER — ATORVASTATIN CALCIUM 40 MG/1
TABLET, FILM COATED ORAL
Qty: 90 TABLET | Refills: 1 | Status: SHIPPED | OUTPATIENT
Start: 2018-05-16 | End: 2018-10-18 | Stop reason: SDUPTHER

## 2018-05-16 RX ORDER — DIAZEPAM 10 MG/1
TABLET ORAL
Qty: 120 TABLET | Refills: 1 | OUTPATIENT
Start: 2018-05-16 | End: 2018-06-14

## 2018-06-05 RX ORDER — DIAZEPAM 10 MG/1
TABLET ORAL
Qty: 40 TABLET | Refills: 1 | Status: CANCELLED | OUTPATIENT
Start: 2018-06-05 | End: 2018-07-05

## 2018-06-13 ENCOUNTER — TELEPHONE (OUTPATIENT)
Dept: FAMILY MEDICINE CLINIC | Age: 58
End: 2018-06-13

## 2018-06-13 DIAGNOSIS — M79.661 PAIN IN BOTH LOWER LEGS: Primary | ICD-10-CM

## 2018-06-13 DIAGNOSIS — M79.662 PAIN IN BOTH LOWER LEGS: Primary | ICD-10-CM

## 2018-06-14 ENCOUNTER — HOSPITAL ENCOUNTER (OUTPATIENT)
Dept: VASCULAR LAB | Age: 58
Discharge: HOME | End: 2018-06-14
Attending: FAMILY MEDICINE | Admitting: FAMILY MEDICINE

## 2018-06-14 ENCOUNTER — TELEPHONE (OUTPATIENT)
Dept: FAMILY MEDICINE CLINIC | Age: 58
End: 2018-06-14

## 2018-06-14 DIAGNOSIS — M79.661 PAIN OF RIGHT LOWER LEG: ICD-10-CM

## 2018-06-14 DIAGNOSIS — M79.661 PAIN IN BOTH LOWER LEGS: ICD-10-CM

## 2018-06-14 DIAGNOSIS — I73.9 CLAUDICATION (HCC): Primary | ICD-10-CM

## 2018-06-14 DIAGNOSIS — M79.662 PAIN IN BOTH LOWER LEGS: ICD-10-CM

## 2018-06-25 ENCOUNTER — TELEPHONE (OUTPATIENT)
Dept: FAMILY MEDICINE CLINIC | Age: 58
End: 2018-06-25

## 2018-06-25 NOTE — TELEPHONE ENCOUNTER
Pt called stating Dr. 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

## 2018-07-05 DIAGNOSIS — F41.9 ANXIETY: Primary | ICD-10-CM

## 2018-07-05 RX ORDER — DIAZEPAM 10 MG/1
TABLET ORAL
Qty: 40 TABLET | Refills: 0 | Status: SHIPPED | OUTPATIENT
Start: 2018-07-05 | End: 2018-07-16 | Stop reason: SDUPTHER

## 2018-07-06 ENCOUNTER — OFFICE VISIT (OUTPATIENT)
Dept: VASCULAR SURGERY | Age: 58
End: 2018-07-06

## 2018-07-06 VITALS
DIASTOLIC BLOOD PRESSURE: 70 MMHG | WEIGHT: 241.6 LBS | SYSTOLIC BLOOD PRESSURE: 100 MMHG | HEIGHT: 72 IN | OXYGEN SATURATION: 98 % | HEART RATE: 97 BPM | BODY MASS INDEX: 32.72 KG/M2

## 2018-07-06 DIAGNOSIS — M79.605 LEFT LEG PAIN: ICD-10-CM

## 2018-07-06 PROCEDURE — 99243 OFF/OP CNSLTJ NEW/EST LOW 30: CPT | Performed by: SURGERY

## 2018-07-09 DIAGNOSIS — F41.9 ANXIETY: ICD-10-CM

## 2018-07-09 RX ORDER — DIAZEPAM 10 MG/1
TABLET ORAL
Qty: 40 TABLET | Refills: 0 | Status: CANCELLED | OUTPATIENT
Start: 2018-07-09 | End: 2018-08-09

## 2018-07-09 NOTE — TELEPHONE ENCOUNTER
Pt called stating he uses [address] and his valium was sent to them for a 30 day supply and they only fill 90 day supply. This is a controlled rx. Please advise if this can be done.

## 2018-07-16 DIAGNOSIS — F41.9 ANXIETY: ICD-10-CM

## 2018-07-16 RX ORDER — DIAZEPAM 10 MG/1
TABLET ORAL
Qty: 40 TABLET | Refills: 0 | Status: SHIPPED | OUTPATIENT
Start: 2018-07-16 | End: 2018-08-27 | Stop reason: SDUPTHER

## 2018-07-27 ENCOUNTER — OFFICE VISIT (OUTPATIENT)
Dept: FAMILY MEDICINE CLINIC | Age: 58
End: 2018-07-27

## 2018-07-27 VITALS
SYSTOLIC BLOOD PRESSURE: 120 MMHG | HEART RATE: 72 BPM | HEIGHT: 72 IN | WEIGHT: 244 LBS | DIASTOLIC BLOOD PRESSURE: 74 MMHG | OXYGEN SATURATION: 95 % | BODY MASS INDEX: 33.05 KG/M2

## 2018-07-27 DIAGNOSIS — H69.82 ETD (EUSTACHIAN TUBE DYSFUNCTION), LEFT: ICD-10-CM

## 2018-07-27 DIAGNOSIS — I10 ESSENTIAL HYPERTENSION: ICD-10-CM

## 2018-07-27 DIAGNOSIS — F51.02 ADJUSTMENT INSOMNIA: Primary | ICD-10-CM

## 2018-07-27 DIAGNOSIS — E11.9 TYPE 2 DIABETES MELLITUS WITHOUT COMPLICATION, UNSPECIFIED LONG TERM INSULIN USE STATUS: ICD-10-CM

## 2018-07-27 LAB — HBA1C MFR BLD: 7.9 %

## 2018-07-27 PROCEDURE — 83036 HEMOGLOBIN GLYCOSYLATED A1C: CPT | Performed by: FAMILY MEDICINE

## 2018-07-27 PROCEDURE — 99214 OFFICE O/P EST MOD 30 MIN: CPT | Performed by: FAMILY MEDICINE

## 2018-07-27 RX ORDER — FLUTICASONE PROPIONATE 50 MCG
2 SPRAY, SUSPENSION (ML) NASAL DAILY
Qty: 1 BOTTLE | Refills: 1 | Status: SHIPPED | OUTPATIENT
Start: 2018-07-27 | End: 2018-11-02 | Stop reason: SDUPTHER

## 2018-07-27 ASSESSMENT — ENCOUNTER SYMPTOMS
COUGH: 0
BLOOD IN STOOL: 0
ABDOMINAL PAIN: 0
SHORTNESS OF BREATH: 0

## 2018-07-30 ENCOUNTER — OFFICE VISIT (OUTPATIENT)
Dept: CARDIOLOGY CLINIC | Age: 58
End: 2018-07-30

## 2018-07-30 VITALS
WEIGHT: 245 LBS | SYSTOLIC BLOOD PRESSURE: 116 MMHG | BODY MASS INDEX: 33.18 KG/M2 | OXYGEN SATURATION: 94 % | HEART RATE: 86 BPM | DIASTOLIC BLOOD PRESSURE: 64 MMHG | HEIGHT: 72 IN

## 2018-07-30 DIAGNOSIS — I25.10 CORONARY ARTERY DISEASE INVOLVING NATIVE CORONARY ARTERY OF NATIVE HEART WITHOUT ANGINA PECTORIS: Primary | ICD-10-CM

## 2018-07-30 DIAGNOSIS — E78.2 MIXED HYPERLIPIDEMIA: ICD-10-CM

## 2018-07-30 DIAGNOSIS — I10 ESSENTIAL HYPERTENSION: ICD-10-CM

## 2018-07-30 PROCEDURE — 99214 OFFICE O/P EST MOD 30 MIN: CPT | Performed by: INTERNAL MEDICINE

## 2018-07-30 NOTE — COMMUNICATION BODY
History of present illness on initial date of evaluation:              Evgeny Montes is a 59 y.o. patient who presents for follow up. Today he reports he has been feeling well from a cardiac standpoint. He has been struggling with his weight as his appetite is not great which he relates to complications related to his abdominal surgeries. He attempts to monitor his diet. He has been taking his medications as prescribed. He walks 3 miles daily without difficulty. Assessment:  59 y.o. patient with:  1. Mild nonobstructive coronary artery disease with preserved left ventricular function              ~Abnormal stress test              ~Cath April 28, 2015  2. Hypertension              ~BP: (116)/(64)    3. Obesity              ~Body mass index is 33.23 kg/m². 4. Abnormal EKG with RBBB  5. Hyperlipidemia              ~on lipitor     Plan:  1. The patient was seen for >25 minutes. >50% of the time was devoted to giving the patient detailed instructions instructions on addressing diet, regular exercise, weight control, smoking abstention, medication compliance, and stress minimization. The patient was provided written and verbal instructions regarding risk factor modification. 2. I recommend that the patient continue their currently prescribed medications. Their drug modifiable risk factors appear to be well controlled. I will continue to address the need/dosing of medications in future visits. 3. Follow up with me in 1 year.

## 2018-08-06 ENCOUNTER — TELEPHONE (OUTPATIENT)
Dept: FAMILY MEDICINE CLINIC | Age: 58
End: 2018-08-06

## 2018-08-27 DIAGNOSIS — F41.9 ANXIETY: ICD-10-CM

## 2018-08-29 RX ORDER — DIAZEPAM 10 MG/1
TABLET ORAL
Qty: 40 TABLET | Refills: 0 | Status: SHIPPED | OUTPATIENT
Start: 2018-08-29 | End: 2018-10-07 | Stop reason: SDUPTHER

## 2018-08-30 ENCOUNTER — OFFICE VISIT (OUTPATIENT)
Dept: DERMATOLOGY | Age: 58
End: 2018-08-30

## 2018-08-30 DIAGNOSIS — L57.0 ACTINIC KERATOSES: Primary | ICD-10-CM

## 2018-08-30 DIAGNOSIS — L72.0 EPIDERMOID CYST: ICD-10-CM

## 2018-08-30 DIAGNOSIS — Z12.83 SCREENING EXAM FOR SKIN CANCER: ICD-10-CM

## 2018-08-30 PROCEDURE — 17003 DESTRUCT PREMALG LES 2-14: CPT | Performed by: DERMATOLOGY

## 2018-08-30 PROCEDURE — 99213 OFFICE O/P EST LOW 20 MIN: CPT | Performed by: DERMATOLOGY

## 2018-08-30 PROCEDURE — 17000 DESTRUCT PREMALG LESION: CPT | Performed by: DERMATOLOGY

## 2018-08-30 NOTE — PROGRESS NOTES
DAILY, TO LAST 30 DAYS. 40 tablet 0    insulin glargine (LANTUS SOLOSTAR) 100 UNIT/ML injection pen Inject 70 Units into the skin nightly Lantus to replace Basaglar. DX:  E11.9 9 pen 2    fluticasone (FLONASE) 50 MCG/ACT nasal spray 2 sprays by Nasal route daily 1 Bottle 1    Probiotic Product (PROBIOTIC-10 ULTIMATE PO) Take by mouth      metFORMIN (GLUCOPHAGE) 1000 MG tablet TAKE 1 TABLET BY MOUTH TWICE A  tablet 1    glipiZIDE (GLUCOTROL) 5 MG tablet TAKE 1 AND 1/2 TABLETS BY MOUTH TWICE A  tablet 5    lisinopril (PRINIVIL;ZESTRIL) 20 MG tablet TAKE 1 TABLET BY MOUTH EVERY DAY 90 tablet 1    insulin aspart (NOVOLOG FLEXPEN) 100 UNIT/ML injection pen INJECT UP TO 15 UNITS TWICE DAILY PER SLIDING SCALE 45 pen 2    atorvastatin (LIPITOR) 40 MG tablet TAKE 1 TABLET BY MOUTH EVERY DAY 90 tablet 1    glucose blood VI test strips (ACCU-CHEK AMA PLUS) strip USE AS DIRECTED UP TO 3 TIMES A DAY Dispense One Touch 300 strip 2    ACCU-CHEK FASTCLIX LANCETS MISC 6 Sticks by Does not apply route 5 times daily DX: E11.9  Please dispense One _ Touch 410 each 3    Insulin Pen Needle (B-D UF III MINI PEN NEEDLES) 31G X 5 MM MISC USE UP TO 3 TIMES DAILY 100 each 5    pantoprazole (PROTONIX) 40 MG tablet TAKE 1 TABLET BY MOUTH DAILY 90 tablet 2    dicyclomine (BENTYL) 10 MG capsule       aspirin 81 MG chewable tablet Take 81 mg by mouth daily      Blood Glucose Monitoring Suppl (ACCU-CHEK AMA PLUS) W/DEVICE KIT USE AS DIRECTED 1 kit 0     Social History:     Physical Examination     The following were examined and determined to be normal: Psych/Neuro. The following were examined and determined to be abnormal: Head/face. -General: Well-appearing, NAD  1. R forearm 2, L 1 and R 1 temples - ill-defined irregularly-shaped roughly-scaling thin pink macules/papules   2. Midline mid back - 5mm round skin-colored papule w/ punctum     Assessment and Plan     1.  Actinic

## 2018-09-12 ENCOUNTER — TELEPHONE (OUTPATIENT)
Dept: FAMILY MEDICINE CLINIC | Age: 58
End: 2018-09-12

## 2018-09-14 ENCOUNTER — TELEPHONE (OUTPATIENT)
Dept: FAMILY MEDICINE CLINIC | Age: 58
End: 2018-09-14

## 2018-09-14 RX ORDER — BLOOD-GLUCOSE METER
1 KIT MISCELLANEOUS DAILY PRN
Qty: 1 KIT | Refills: 0 | Status: SHIPPED | OUTPATIENT
Start: 2018-09-14 | End: 2021-02-12

## 2018-09-17 RX ORDER — LANCETS
EACH MISCELLANEOUS
Qty: 410 EACH | Refills: 3 | Status: SHIPPED | OUTPATIENT
Start: 2018-09-17 | End: 2019-03-11 | Stop reason: SDUPTHER

## 2018-10-07 DIAGNOSIS — F41.9 ANXIETY: ICD-10-CM

## 2018-10-08 RX ORDER — DIAZEPAM 10 MG/1
TABLET ORAL
Qty: 40 TABLET | Refills: 0 | Status: SHIPPED | OUTPATIENT
Start: 2018-10-08 | End: 2018-11-19 | Stop reason: SDUPTHER

## 2018-10-18 RX ORDER — LISINOPRIL 20 MG/1
TABLET ORAL
Qty: 90 TABLET | Refills: 1 | Status: SHIPPED | OUTPATIENT
Start: 2018-10-18 | End: 2019-03-11 | Stop reason: SDUPTHER

## 2018-10-18 RX ORDER — PEN NEEDLE, DIABETIC 31 GX5/16"
NEEDLE, DISPOSABLE MISCELLANEOUS
Qty: 100 EACH | Refills: 3 | Status: SHIPPED | OUTPATIENT
Start: 2018-10-18 | End: 2020-01-16 | Stop reason: SDUPTHER

## 2018-10-18 RX ORDER — INSULIN ASPART 100 [IU]/ML
INJECTION, SOLUTION INTRAVENOUS; SUBCUTANEOUS
Qty: 30 ML | Refills: 5 | Status: SHIPPED | OUTPATIENT
Start: 2018-10-18 | End: 2020-01-16 | Stop reason: SDUPTHER

## 2018-10-18 RX ORDER — ATORVASTATIN CALCIUM 40 MG/1
TABLET, FILM COATED ORAL
Qty: 90 TABLET | Refills: 0 | Status: SHIPPED | OUTPATIENT
Start: 2018-10-18 | End: 2019-03-11 | Stop reason: SDUPTHER

## 2018-10-22 ENCOUNTER — OFFICE VISIT (OUTPATIENT)
Dept: FAMILY MEDICINE CLINIC | Age: 58
End: 2018-10-22
Payer: COMMERCIAL

## 2018-10-22 VITALS
DIASTOLIC BLOOD PRESSURE: 76 MMHG | WEIGHT: 251 LBS | OXYGEN SATURATION: 96 % | BODY MASS INDEX: 34 KG/M2 | SYSTOLIC BLOOD PRESSURE: 122 MMHG | HEART RATE: 85 BPM | HEIGHT: 72 IN

## 2018-10-22 DIAGNOSIS — M15.9 PRIMARY OSTEOARTHRITIS INVOLVING MULTIPLE JOINTS: ICD-10-CM

## 2018-10-22 DIAGNOSIS — H69.82 ETD (EUSTACHIAN TUBE DYSFUNCTION), LEFT: ICD-10-CM

## 2018-10-22 DIAGNOSIS — I10 ESSENTIAL HYPERTENSION: ICD-10-CM

## 2018-10-22 DIAGNOSIS — E11.9 TYPE 2 DIABETES MELLITUS WITHOUT COMPLICATION, UNSPECIFIED WHETHER LONG TERM INSULIN USE (HCC): Primary | ICD-10-CM

## 2018-10-22 PROBLEM — M15.0 PRIMARY OSTEOARTHRITIS INVOLVING MULTIPLE JOINTS: Status: ACTIVE | Noted: 2018-10-22

## 2018-10-22 LAB — HBA1C MFR BLD: 8.8 %

## 2018-10-22 PROCEDURE — 83036 HEMOGLOBIN GLYCOSYLATED A1C: CPT | Performed by: FAMILY MEDICINE

## 2018-10-22 PROCEDURE — 99214 OFFICE O/P EST MOD 30 MIN: CPT | Performed by: FAMILY MEDICINE

## 2018-10-22 ASSESSMENT — ENCOUNTER SYMPTOMS
SHORTNESS OF BREATH: 0
ABDOMINAL PAIN: 0
BLOOD IN STOOL: 0
COUGH: 0

## 2018-10-22 NOTE — PROGRESS NOTES
Psychiatric/Behavioral: Negative for suicidal ideas. The patient is not nervous/anxious. Objective:   Physical Exam     Physical Exam   Constitutional: He is oriented to person, place, and time. He appears well-developed and well-nourished. HENT:   Head: Normocephalic. Eyes: Conjunctivae are normal.   Neck: Neck supple. Carotid bruit is not present. No thyromegaly present. Cardiovascular: Normal rate, regular rhythm, normal heart sounds and intact distal pulses. Pulmonary/Chest: Effort normal and breath sounds normal.   Abdominal: Soft. He exhibits no distension and no mass. There is no tenderness. Multiple Sx scarring   Musculoskeletal: Normal range of motion. He exhibits no edema. No gross deform of hands-tight to flex   Lymphadenopathy:     He has no cervical adenopathy. Neurological: He is alert and oriented to person, place, and time. Skin: Skin is warm and dry. Psychiatric: He has a normal mood and affect. His behavior is normal. Judgment and thought content normal.       Assessment:       Diagnosis Orders   1. Type 2 diabetes mellitus without complication, unspecified whether long term insulin use (HCC)  POCT glycosylated hemoglobin (Hb A1C)   2. Essential hypertension     3. Primary osteoarthritis involving multiple joints     4. ETD (Eustachian tube dysfunction), left           Plan:      Elva Rojas was seen today for 3 month follow-up, insomnia, diabetes, cerumen impaction and arthritis.     Diagnoses and all orders for this visit:    Type 2 diabetes mellitus without complication, unspecified whether long term insulin use (HCC)  -     POCT glycosylated hemoglobin (Hb A1C)  AIC 8.8-continue meds-low carbs-try 4 units Novolin prior to dinner  Essential hypertension  Continue meds-JEFF diet  Primary osteoarthritis involving multiple joints  Try Tyl Arthritis in AM  ETD (Eustachian tube dysfunction), left  Sudafed & flonase     See me 3 mos     Miguel Snow, DO

## 2019-01-25 ENCOUNTER — TELEPHONE (OUTPATIENT)
Dept: FAMILY MEDICINE CLINIC | Age: 59
End: 2019-01-25

## 2019-01-28 ENCOUNTER — OFFICE VISIT (OUTPATIENT)
Dept: FAMILY MEDICINE CLINIC | Age: 59
End: 2019-01-28
Payer: COMMERCIAL

## 2019-01-28 VITALS
HEART RATE: 88 BPM | SYSTOLIC BLOOD PRESSURE: 142 MMHG | RESPIRATION RATE: 18 BRPM | HEIGHT: 72 IN | WEIGHT: 252 LBS | DIASTOLIC BLOOD PRESSURE: 82 MMHG | BODY MASS INDEX: 34.13 KG/M2 | OXYGEN SATURATION: 98 %

## 2019-01-28 DIAGNOSIS — M54.50 ACUTE EXACERBATION OF CHRONIC LOW BACK PAIN: ICD-10-CM

## 2019-01-28 DIAGNOSIS — M54.2 ACUTE NECK PAIN: ICD-10-CM
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
## 2019-01-28 DIAGNOSIS — M54.6 ACUTE MIDLINE THORACIC BACK PAIN: ICD-10-CM

## 2019-01-28 DIAGNOSIS — S06.9X1A TRAUMATIC BRAIN INJURY, WITH LOSS OF CONSCIOUSNESS OF 30 MINUTES OR LESS, INITIAL ENCOUNTER (HCC): ICD-10-CM

## 2019-01-28 DIAGNOSIS — G89.29 ACUTE EXACERBATION OF CHRONIC LOW BACK PAIN: ICD-10-CM

## 2019-01-28 PROCEDURE — 99214 OFFICE O/P EST MOD 30 MIN: CPT | Performed by: NURSE PRACTITIONER

## 2019-01-28 RX ORDER — METHOCARBAMOL 500 MG/1
500 TABLET, FILM COATED ORAL 3 TIMES DAILY PRN
Qty: 30 TABLET | Refills: 0 | Status: SHIPPED | OUTPATIENT
Start: 2019-01-28 | End: 2019-02-07

## 2019-01-28 RX ORDER — TRAMADOL HYDROCHLORIDE 50 MG/1
50 TABLET ORAL EVERY 8 HOURS PRN
Qty: 20 TABLET | Refills: 0 | Status: SHIPPED | OUTPATIENT
Start: 2019-01-28 | End: 2019-02-02

## 2019-01-30 ASSESSMENT — ENCOUNTER SYMPTOMS
ABDOMINAL PAIN: 0
RESPIRATORY NEGATIVE: 1
BACK PAIN: 1

## 2019-02-06 ENCOUNTER — TELEPHONE (OUTPATIENT)
Dept: FAMILY MEDICINE CLINIC | Age: 59
End: 2019-02-06

## 2019-02-11 ENCOUNTER — OFFICE VISIT (OUTPATIENT)
Dept: FAMILY MEDICINE CLINIC | Age: 59
End: 2019-02-11
Payer: COMMERCIAL

## 2019-02-11 VITALS
HEART RATE: 89 BPM | HEIGHT: 72 IN | SYSTOLIC BLOOD PRESSURE: 122 MMHG | OXYGEN SATURATION: 93 % | BODY MASS INDEX: 34.38 KG/M2 | WEIGHT: 253.8 LBS | DIASTOLIC BLOOD PRESSURE: 80 MMHG

## 2019-02-11 DIAGNOSIS — E11.9 TYPE 2 DIABETES MELLITUS WITHOUT COMPLICATION, WITH LONG-TERM CURRENT USE OF INSULIN (HCC): Primary | ICD-10-CM

## 2019-02-11 DIAGNOSIS — M54.41 CHRONIC BILATERAL LOW BACK PAIN WITH BILATERAL SCIATICA: ICD-10-CM

## 2019-02-11 DIAGNOSIS — Z79.4 TYPE 2 DIABETES MELLITUS WITHOUT COMPLICATION, WITH LONG-TERM CURRENT USE OF INSULIN (HCC): Primary | ICD-10-CM

## 2019-02-11 DIAGNOSIS — I10 ESSENTIAL HYPERTENSION: ICD-10-CM

## 2019-02-11 DIAGNOSIS — M54.42 CHRONIC BILATERAL LOW BACK PAIN WITH BILATERAL SCIATICA: ICD-10-CM

## 2019-02-11 DIAGNOSIS — F51.02 ADJUSTMENT INSOMNIA: ICD-10-CM

## 2019-02-11 DIAGNOSIS — G89.29 CHRONIC BILATERAL LOW BACK PAIN WITH BILATERAL SCIATICA: ICD-10-CM

## 2019-02-11 LAB — HBA1C MFR BLD: 7.8 %

## 2019-02-11 PROCEDURE — 99214 OFFICE O/P EST MOD 30 MIN: CPT | Performed by: FAMILY MEDICINE

## 2019-02-11 PROCEDURE — 83036 HEMOGLOBIN GLYCOSYLATED A1C: CPT | Performed by: FAMILY MEDICINE

## 2019-02-11 RX ORDER — OXYCODONE HYDROCHLORIDE 5 MG/1
5 TABLET ORAL DAILY PRN
Qty: 30 TABLET | Refills: 0 | Status: SHIPPED | OUTPATIENT
Start: 2019-02-11 | End: 2019-03-13

## 2019-02-11 ASSESSMENT — ENCOUNTER SYMPTOMS
BLOOD IN STOOL: 0
COUGH: 0
SHORTNESS OF BREATH: 0
ABDOMINAL PAIN: 0
BACK PAIN: 1

## 2019-02-11 ASSESSMENT — PATIENT HEALTH QUESTIONNAIRE - PHQ9
2. FEELING DOWN, DEPRESSED OR HOPELESS: 0
SUM OF ALL RESPONSES TO PHQ QUESTIONS 1-9: 0
SUM OF ALL RESPONSES TO PHQ9 QUESTIONS 1 & 2: 0
1. LITTLE INTEREST OR PLEASURE IN DOING THINGS: 0
SUM OF ALL RESPONSES TO PHQ QUESTIONS 1-9: 0

## 2019-02-26 DIAGNOSIS — F41.9 ANXIETY: ICD-10-CM

## 2019-02-27 RX ORDER — DIAZEPAM 10 MG/1
TABLET ORAL
Qty: 40 TABLET | Refills: 2 | Status: SHIPPED | OUTPATIENT
Start: 2019-02-27 | End: 2019-06-09 | Stop reason: SDUPTHER

## 2019-03-11 RX ORDER — LANCETS
EACH MISCELLANEOUS
Qty: 410 EACH | Refills: 3 | Status: SHIPPED | OUTPATIENT
Start: 2019-03-11 | End: 2021-02-12

## 2019-03-11 RX ORDER — GLUCOSAMINE HCL/CHONDROITIN SU 500-400 MG
CAPSULE ORAL
Qty: 100 STRIP | Refills: 5 | Status: SHIPPED | OUTPATIENT
Start: 2019-03-11 | End: 2019-03-13

## 2019-03-13 RX ORDER — LANCETS
1 EACH MISCELLANEOUS 3 TIMES DAILY
Qty: 300 EACH | Refills: 3 | Status: SHIPPED | OUTPATIENT
Start: 2019-03-13 | End: 2020-01-17 | Stop reason: SDUPTHER

## 2019-03-13 NOTE — TELEPHONE ENCOUNTER
Pharmacy called to confirm the switch to accu-chek, Needs to confirm script is for 3 x daily, also need to know if they can dispense 90 day supply which is required by insurance if using mail order. 90 day supply is 3 boxes or 300 of each strips and lancets.  Any questions call Lisbet Douglas at [phone]

## 2019-04-25 ENCOUNTER — TELEPHONE (OUTPATIENT)
Dept: CARDIOLOGY CLINIC | Age: 59
End: 2019-04-25

## 2019-04-25 DIAGNOSIS — R07.9 CHEST PAIN, UNSPECIFIED TYPE: Primary | ICD-10-CM

## 2019-04-25 NOTE — TELEPHONE ENCOUNTER
Pt called requesting to speak with vsp regarding a CT scan he had done at , his BP and SOB.  Patient states if he does not answer to please LM

## 2019-04-26 NOTE — TELEPHONE ENCOUNTER
Spoke with pt. He had a CT abdomen done recently at [address] [place] [address] that showed CAD. He had a cath 4/24/2015 that showed non-obstructive CAD. He was feeling well until recently. He has been feeling exertional SOB and random left sided chest pain. This pain is at rest and with exertion, does not radiate, resolves on its own. He has occasional lightheadedness. BP has been stable,. 120s/80s. He is concerned that his CAD has worsened. Do you want any testing or OV?

## 2019-04-29 ENCOUNTER — TELEPHONE (OUTPATIENT)
Dept: CARDIOLOGY CLINIC | Age: 59
End: 2019-04-29

## 2019-04-29 DIAGNOSIS — R07.9 CHEST PAIN, UNSPECIFIED TYPE: Primary | ICD-10-CM

## 2019-04-29 NOTE — TELEPHONE ENCOUNTER
Pt called to schedule his stress test. Pt sts he is unable to do treadmill due to his back problems. Can order be changed to chemical stress test. Please advise.

## 2019-04-30 NOTE — TELEPHONE ENCOUNTER
There is another encounter in pt's chart that was started yesterday 4-29-19. Pt's original testing order has been changed. Pt was given central scheduling # to call to schedule according to message.

## 2019-05-14 ENCOUNTER — HOSPITAL ENCOUNTER (OUTPATIENT)
Dept: CARDIOLOGY | Age: 59
Discharge: HOME | End: 2019-05-14
Payer: COMMERCIAL

## 2019-05-14 ENCOUNTER — TELEPHONE (OUTPATIENT)
Dept: CARDIOLOGY CLINIC | Age: 59
End: 2019-05-14

## 2019-05-14 DIAGNOSIS — R07.9 CHEST PAIN, UNSPECIFIED TYPE: ICD-10-CM

## 2019-05-14 LAB
LV EF: 50 %
LVEF MODALITY: NORMAL

## 2019-05-14 PROCEDURE — 78452 HT MUSCLE IMAGE SPECT MULT: CPT

## 2019-05-14 PROCEDURE — 3430000000 HC RX DIAGNOSTIC RADIOPHARMACEUTICAL: Performed by: INTERNAL MEDICINE

## 2019-05-14 PROCEDURE — 6360000002 HC RX W HCPCS: Performed by: INTERNAL MEDICINE

## 2019-05-14 PROCEDURE — 93017 CV STRESS TEST TRACING ONLY: CPT

## 2019-05-14 PROCEDURE — A9502 TC99M TETROFOSMIN: HCPCS | Performed by: INTERNAL MEDICINE

## 2019-05-14 RX ADMIN — REGADENOSON 0.4 MG: 0.08 INJECTION, SOLUTION INTRAVENOUS at 08:50

## 2019-05-14 RX ADMIN — TETROFOSMIN 11.1 MILLICURIE: 1.38 INJECTION, POWDER, LYOPHILIZED, FOR SOLUTION INTRAVENOUS at 07:50

## 2019-05-14 RX ADMIN — TETROFOSMIN 34.7 MILLICURIE: 1.38 INJECTION, POWDER, LYOPHILIZED, FOR SOLUTION INTRAVENOUS at 08:53

## 2019-05-14 NOTE — TELEPHONE ENCOUNTER
----- Message from Jaclyn Vaughan MD sent at 5/14/2019  4:09 PM EDT -----  The stress test is consistent with previous scans. Please see me as planned.

## 2019-05-14 NOTE — TELEPHONE ENCOUNTER
Spoke with Ammon Merlin, relayed message per VSP regarding stress test results. Pt verbalized understanding and made August 2019 appt.

## 2019-06-03 ENCOUNTER — OFFICE VISIT (OUTPATIENT)
Dept: FAMILY MEDICINE CLINIC | Age: 59
End: 2019-06-03
Payer: COMMERCIAL

## 2019-06-03 VITALS
DIASTOLIC BLOOD PRESSURE: 68 MMHG | WEIGHT: 253.4 LBS | BODY MASS INDEX: 34.32 KG/M2 | HEIGHT: 72 IN | OXYGEN SATURATION: 93 % | SYSTOLIC BLOOD PRESSURE: 138 MMHG | HEART RATE: 98 BPM

## 2019-06-03 DIAGNOSIS — F41.9 ANXIETY: Primary | ICD-10-CM

## 2019-06-03 DIAGNOSIS — F51.02 ADJUSTMENT INSOMNIA: ICD-10-CM

## 2019-06-03 DIAGNOSIS — I10 ESSENTIAL HYPERTENSION: ICD-10-CM

## 2019-06-03 PROCEDURE — 99214 OFFICE O/P EST MOD 30 MIN: CPT | Performed by: FAMILY MEDICINE

## 2019-06-03 ASSESSMENT — ENCOUNTER SYMPTOMS
COUGH: 0
SHORTNESS OF BREATH: 0
BACK PAIN: 1

## 2019-06-03 NOTE — PATIENT INSTRUCTIONS
Anxiety  Meds as needed-keep active  Adjustment insomnia  As above  Essential hypertension  Continue meds-JEFF diet     See me 3 mos

## 2019-06-03 NOTE — PROGRESS NOTES
Subjective:      Patient ID: Josiane Garcia is a 59 y.o. male. HPI  In for check on Anxiety & Insomnia(meds work well-pt more rested-more productive-feels better--HT(OK at home). Prior to Visit Medications :  Medication Blood Glucose Monitoring Suppl (ACCU-CHEK COMPACT CARE KIT) KIT, Sig 1 each by Does not apply route daily, Taking? Yes, Authorizing Provider Miguel Snow, DO    Medication blood glucose test strips (ACCU-CHEK COMFORT CURVE) strip, Sig 1 each by In Vitro route daily As needed. , Taking? Yes, Authorizing Provider Miguel Snow, DO    Medication ACCU-CHEK MULTICLIX LANCETS MISC, Sig 1 each by Does not apply route 3 times daily, Taking? Yes, Authorizing Provider Miguel Snow, DO    Medication ACCU-CHEK FASTCLIX LANCETS MISC, Sig USE 3x TIMES DAILY, Taking? Yes, Authorizing Provider Miguel Snow, DO    Medication metFORMIN (GLUCOPHAGE) 1000 MG tablet, Sig Take 1 tablet by mouth twice a day, Taking? Yes, Authorizing Provider Miguel Snow, DO    Medication lisinopril (PRINIVIL;ZESTRIL) 20 MG tablet, Sig Take 1 tablet by mouth every day, Taking? Yes, Authorizing Provider Miguel Snow, DO    Medication atorvastatin (LIPITOR) 40 MG tablet, Sig Take 1 tablet by mouth every day, Taking? Yes, Authorizing Provider Miguel Snow, DO    Medication LANTUS SOLOSTAR 100 UNIT/ML injection pen, Sig Inject 70 units subcutaneously nightly (lantus to replace basaglar), Taking? Yes, Authorizing Provider Miguel Snow, DO    Medication fluticasone (FLONASE) 50 MCG/ACT nasal spray, Sig SPRAY 2 SPRAYS INTO EACH NOSTRIL EVERY DAY, Taking? Yes, Authorizing Provider AMADA Terrell - CNP    Medication B-D UF III MINI PEN NEEDLES 31G X 5 MM MISC, Sig Use up to 3 times a day, Taking? Yes, Authorizing Provider Miguel Snow, DO    Medication NOVOLOG FLEXPEN 100 UNIT/ML injection pen, Sig Inject up to 15 units subcutaneously twice a day per sliding scale, Taking?  Yes, Authorizing Provider Teresita Nieto Cardiovascular: Negative for chest pain and palpitations. Genitourinary: Negative for frequency and hematuria. Musculoskeletal: Positive for arthralgias and back pain. Neurological: Negative for tremors and headaches. Psychiatric/Behavioral: Positive for sleep disturbance. The patient is nervous/anxious. Objective:   Physical Exam      Physical Exam   Constitutional: He is oriented to person, place, and time. He appears well-developed and well-nourished. HENT:   Head: Normocephalic. Mouth/Throat: Oropharynx is clear and moist.   Eyes: Conjunctivae are normal.   Neck: Neck supple. Carotid bruit is not present. No thyromegaly present. Cardiovascular: Normal rate, regular rhythm and normal heart sounds. Pulmonary/Chest: Effort normal and breath sounds normal.   Abdominal: Soft. He exhibits no distension and no mass. There is no tenderness. Musculoskeletal: He exhibits no edema. Lymphadenopathy:     He has no cervical adenopathy. Neurological: He is alert and oriented to person, place, and time. Skin: Skin is warm and dry. Psychiatric: He has a normal mood and affect. His behavior is normal. Judgment and thought content normal.       Assessment:       Diagnosis Orders   1. Anxiety     2. Adjustment insomnia     3. Essential hypertension           Plan:      Miracle Balderas was seen today for 3 month follow-up.     Diagnoses and all orders for this visit:    Anxiety  Meds as needed-keep active  Adjustment insomnia  As above  Essential hypertension  Continue meds-JEFF diet     See me 3 mos     iMguel Snow, DO

## 2019-06-09 DIAGNOSIS — F41.9 ANXIETY: ICD-10-CM

## 2019-06-10 RX ORDER — DIAZEPAM 10 MG/1
TABLET ORAL
Qty: 40 TABLET | Refills: 2 | Status: SHIPPED | OUTPATIENT
Start: 2019-06-10 | End: 2019-10-01 | Stop reason: SDUPTHER

## 2019-06-10 NOTE — TELEPHONE ENCOUNTER
.  Last office visit 7/27/2018     Last written 2-27-19 40 with 2      Next office visit scheduled 9-9-19    Requested Prescriptions     Pending Prescriptions Disp Refills    diazepam (VALIUM) 10 MG tablet [Pharmacy Med Name: DIAZEPAM 10 MG TABLET] 40 tablet 2     Sig: TAKE 1 AND 1/2 TABLETS BY MOUTH DAILY, TO LAST 30 DAYS.

## 2019-08-26 ENCOUNTER — TELEPHONE (OUTPATIENT)
Dept: FAMILY MEDICINE CLINIC | Age: 59
End: 2019-08-26

## 2019-08-28 ENCOUNTER — OFFICE VISIT (OUTPATIENT)
Dept: FAMILY MEDICINE CLINIC | Age: 59
End: 2019-08-28
Payer: COMMERCIAL

## 2019-08-28 VITALS
HEIGHT: 72 IN | DIASTOLIC BLOOD PRESSURE: 64 MMHG | WEIGHT: 252 LBS | HEART RATE: 97 BPM | SYSTOLIC BLOOD PRESSURE: 110 MMHG | BODY MASS INDEX: 34.13 KG/M2 | OXYGEN SATURATION: 97 %

## 2019-08-28 DIAGNOSIS — M79.604 LOWER EXTREMITY PAIN, BILATERAL: Primary | ICD-10-CM

## 2019-08-28 DIAGNOSIS — M51.36 DDD (DEGENERATIVE DISC DISEASE), LUMBAR: ICD-10-CM

## 2019-08-28 DIAGNOSIS — M79.605 LOWER EXTREMITY PAIN, BILATERAL: Primary | ICD-10-CM

## 2019-08-28 PROCEDURE — 99214 OFFICE O/P EST MOD 30 MIN: CPT | Performed by: FAMILY MEDICINE

## 2019-08-28 RX ORDER — GABAPENTIN 100 MG/1
100 CAPSULE ORAL 3 TIMES DAILY
Qty: 90 CAPSULE | Refills: 1 | Status: SHIPPED | OUTPATIENT
Start: 2019-08-28 | End: 2019-10-01 | Stop reason: SDUPTHER

## 2019-09-09 ENCOUNTER — TELEPHONE (OUTPATIENT)
Dept: FAMILY MEDICINE CLINIC | Age: 59
End: 2019-09-09

## 2019-09-09 ENCOUNTER — OFFICE VISIT (OUTPATIENT)
Dept: FAMILY MEDICINE CLINIC | Age: 59
End: 2019-09-09
Payer: COMMERCIAL

## 2019-09-09 VITALS
HEART RATE: 82 BPM | DIASTOLIC BLOOD PRESSURE: 82 MMHG | WEIGHT: 249 LBS | OXYGEN SATURATION: 96 % | HEIGHT: 72 IN | SYSTOLIC BLOOD PRESSURE: 124 MMHG | BODY MASS INDEX: 33.72 KG/M2

## 2019-09-09 DIAGNOSIS — Z79.4 TYPE 2 DIABETES MELLITUS WITHOUT COMPLICATION, WITH LONG-TERM CURRENT USE OF INSULIN (HCC): ICD-10-CM

## 2019-09-09 DIAGNOSIS — F51.02 ADJUSTMENT INSOMNIA: ICD-10-CM

## 2019-09-09 DIAGNOSIS — I10 ESSENTIAL HYPERTENSION: Primary | ICD-10-CM

## 2019-09-09 DIAGNOSIS — Z12.5 SPECIAL SCREENING FOR MALIGNANT NEOPLASM OF PROSTATE: ICD-10-CM

## 2019-09-09 DIAGNOSIS — E11.9 TYPE 2 DIABETES MELLITUS WITHOUT COMPLICATION, WITH LONG-TERM CURRENT USE OF INSULIN (HCC): ICD-10-CM

## 2019-09-09 LAB
A/G RATIO: 1.8 (ref 1.1–2.2)
ALBUMIN SERPL-MCNC: 4.7 G/DL (ref 3.4–5)
ALP BLD-CCNC: 63 U/L (ref 40–129)
ALT SERPL-CCNC: 38 U/L (ref 10–40)
ANION GAP SERPL CALCULATED.3IONS-SCNC: 17 MMOL/L (ref 3–16)
AST SERPL-CCNC: 33 U/L (ref 15–37)
BASOPHILS ABSOLUTE: 0 K/UL (ref 0–0.2)
BASOPHILS RELATIVE PERCENT: 0.6 %
BILIRUB SERPL-MCNC: 0.4 MG/DL (ref 0–1)
BUN BLDV-MCNC: 12 MG/DL (ref 7–20)
CALCIUM SERPL-MCNC: 9.8 MG/DL (ref 8.3–10.6)
CHLORIDE BLD-SCNC: 104 MMOL/L (ref 99–110)
CHOLESTEROL, TOTAL: 116 MG/DL (ref 0–199)
CO2: 20 MMOL/L (ref 21–32)
CREAT SERPL-MCNC: 1.1 MG/DL (ref 0.8–1.3)
CREATININE URINE POCT: NORMAL
EOSINOPHILS ABSOLUTE: 0.1 K/UL (ref 0–0.6)
EOSINOPHILS RELATIVE PERCENT: 0.9 %
GFR AFRICAN AMERICAN: >60
GFR NON-AFRICAN AMERICAN: >60
GLOBULIN: 2.6 G/DL
GLUCOSE BLD-MCNC: 91 MG/DL (ref 70–99)
HBA1C MFR BLD: 7.6 %
HCT VFR BLD CALC: 40.8 % (ref 40.5–52.5)
HDLC SERPL-MCNC: 51 MG/DL (ref 40–60)
HEMOGLOBIN: 13.8 G/DL (ref 13.5–17.5)
LDL CHOLESTEROL CALCULATED: 50 MG/DL
LYMPHOCYTES ABSOLUTE: 1.8 K/UL (ref 1–5.1)
LYMPHOCYTES RELATIVE PERCENT: 28.2 %
MCH RBC QN AUTO: 30.7 PG (ref 26–34)
MCHC RBC AUTO-ENTMCNC: 33.8 G/DL (ref 31–36)
MCV RBC AUTO: 90.7 FL (ref 80–100)
MICROALBUMIN/CREAT 24H UR: NORMAL MG/G{CREAT}
MICROALBUMIN/CREAT UR-RTO: NORMAL
MONOCYTES ABSOLUTE: 0.5 K/UL (ref 0–1.3)
MONOCYTES RELATIVE PERCENT: 7.8 %
NEUTROPHILS ABSOLUTE: 3.9 K/UL (ref 1.7–7.7)
NEUTROPHILS RELATIVE PERCENT: 62.5 %
PDW BLD-RTO: 13.5 % (ref 12.4–15.4)
PLATELET # BLD: 190 K/UL (ref 135–450)
PMV BLD AUTO: 9.8 FL (ref 5–10.5)
POTASSIUM SERPL-SCNC: 4.6 MMOL/L (ref 3.5–5.1)
PROSTATE SPECIFIC ANTIGEN: 1.05 NG/ML (ref 0–4)
RBC # BLD: 4.5 M/UL (ref 4.2–5.9)
SODIUM BLD-SCNC: 141 MMOL/L (ref 136–145)
TOTAL PROTEIN: 7.3 G/DL (ref 6.4–8.2)
TRIGL SERPL-MCNC: 74 MG/DL (ref 0–150)
VLDLC SERPL CALC-MCNC: 15 MG/DL
WBC # BLD: 6.3 K/UL (ref 4–11)

## 2019-09-09 PROCEDURE — 90715 TDAP VACCINE 7 YRS/> IM: CPT | Performed by: FAMILY MEDICINE

## 2019-09-09 PROCEDURE — 82044 UR ALBUMIN SEMIQUANTITATIVE: CPT | Performed by: FAMILY MEDICINE

## 2019-09-09 PROCEDURE — 90732 PPSV23 VACC 2 YRS+ SUBQ/IM: CPT | Performed by: FAMILY MEDICINE

## 2019-09-09 PROCEDURE — 99214 OFFICE O/P EST MOD 30 MIN: CPT | Performed by: FAMILY MEDICINE

## 2019-09-09 PROCEDURE — 90471 IMMUNIZATION ADMIN: CPT | Performed by: FAMILY MEDICINE

## 2019-09-09 PROCEDURE — 90686 IIV4 VACC NO PRSV 0.5 ML IM: CPT | Performed by: FAMILY MEDICINE

## 2019-09-09 PROCEDURE — 83036 HEMOGLOBIN GLYCOSYLATED A1C: CPT | Performed by: FAMILY MEDICINE

## 2019-09-09 PROCEDURE — 90472 IMMUNIZATION ADMIN EACH ADD: CPT | Performed by: FAMILY MEDICINE

## 2019-09-09 RX ORDER — DIAZEPAM 10 MG/1
TABLET ORAL
Refills: 2 | COMMUNITY
Start: 2019-08-25 | End: 2019-10-03

## 2019-09-09 ASSESSMENT — ENCOUNTER SYMPTOMS
ABDOMINAL PAIN: 1
SHORTNESS OF BREATH: 0
COUGH: 0

## 2019-09-09 NOTE — PROGRESS NOTES
Subjective:      Patient ID: Brandon Soto is a 59 y.o. male. HPI  HPI    Review of Systems    Review of Systems   Constitutional: Negative for unexpected weight change. HENT: Negative for congestion and postnasal drip. Eyes: Negative for visual disturbance. Respiratory: Negative for cough and shortness of breath. Cardiovascular: Negative for chest pain, palpitations and leg swelling. Gastrointestinal: Positive for abdominal pain. Sees GI   Genitourinary: Positive for frequency. Frequ-onset several mos   Musculoskeletal:        Chr LBP-had 2 inj-no help-no PM anymore(WC)   Neurological: Negative for tremors and headaches. Psychiatric/Behavioral: Positive for sleep disturbance. The patient is not nervous/anxious. Objective:   Physical Exam      Physical Exam   Constitutional: He is oriented to person, place, and time. He appears well-developed and well-nourished. HENT:   Head: Normocephalic. Mouth/Throat: Oropharynx is clear and moist.   Eyes: Conjunctivae are normal.   Neck: Neck supple. Carotid bruit is not present. No thyromegaly present. Cardiovascular: Normal rate, regular rhythm, normal heart sounds and intact distal pulses. Pulmonary/Chest: Effort normal and breath sounds normal.   Abdominal: Soft. He exhibits no distension and no mass. There is no tenderness. Musculoskeletal: He exhibits no edema. Tender,mild spasm,reduced rom L spine(Work Comp)   Lymphadenopathy:     He has no cervical adenopathy. Neurological: He is alert and oriented to person, place, and time. No evidence of decreased sensation in either foot. Pulses are 2+. Skin: Skin is warm and dry. Psychiatric: He has a normal mood and affect. His behavior is normal. Judgment and thought content normal.       Assessment:       Diagnosis Orders   1. Essential hypertension     2. Adjustment insomnia     3.  Type 2 diabetes mellitus without complication, with long-term current use of insulin

## 2019-09-11 NOTE — PROGRESS NOTES
[address]  Dermatology  Armani Joyner MD  [address] [facility]  1960    59 y.o. male     Date of Visit: 9/12/2019    Last Visit: 1yr    Chief Complaint: Skin check    History of Present Illness:  1. Here for upper body skin check. History of actinic keratoses s/p cryotherapy.   -Has not been spending as much time in sun because moved to an indoors position at work. Wears SPF 30 sunscreen on face daily. 2. Concerned about an asymptomatic raised lesion on back     Review of Systems:  Constitutional: Reports general sense of well-being. Skin: No interval severe sunburns. Allergies: Reviewed and updated. Past Medical History, Surgical History, Medications and Allergies reviewed. Past Medical History:   Diagnosis Date    Chronic back pain     Chronic pain     Diverticulitis     DVT (deep venous thrombosis) (HCC)     Hyperlipidemia     Irritable bowel syndrome     Pain medication agreement signed     discontued care due eye surgery/imbilical hernia surgery -    Type II or unspecified type diabetes mellitus without mention of complication, not stated as uncontrolled     Umbilical hernia     having surgery 11/23     Past Surgical History:   Procedure Laterality Date    ABDOMEN SURGERY      ABDOMINAL ADHESION SURGERY      CATARACT REMOVAL WITH IMPLANT Left 11/13/15    COLON SURGERY      times 3    COLONOSCOPY      HERNIA REPAIR      OTHER SURGICAL HISTORY  9-26-12    EXCISION OF RIGHT THYROID NODULE          THROAT SURGERY Bilateral october 2013    TONSILLECTOMY      UPPER GASTROINTESTINAL ENDOSCOPY  02/10/2017    gastritis/duodenitis       Allergies   Allergen Reactions    Codeine Hives and Nausea Only     25 years ago-unsure if allergy stated    Dilaudid [Hydromorphone Hcl]     Fentanyl      Patch caused skin irritation. IV does not bother pt has had fentanyl IV before.      Toradol [Ketorolac Tromethamine]      Outpatient Medications Marked as Taking for the 9/12/19

## 2019-09-12 ENCOUNTER — OFFICE VISIT (OUTPATIENT)
Dept: DERMATOLOGY | Age: 59
End: 2019-09-12
Payer: COMMERCIAL

## 2019-09-12 DIAGNOSIS — Z12.83 SCREENING EXAM FOR SKIN CANCER: ICD-10-CM

## 2019-09-12 DIAGNOSIS — L72.0 EPIDERMOID CYST: ICD-10-CM

## 2019-09-12 DIAGNOSIS — L57.0 ACTINIC KERATOSES: Primary | ICD-10-CM

## 2019-09-12 PROCEDURE — 17000 DESTRUCT PREMALG LESION: CPT | Performed by: DERMATOLOGY

## 2019-09-12 PROCEDURE — 99213 OFFICE O/P EST LOW 20 MIN: CPT | Performed by: DERMATOLOGY

## 2019-09-12 PROCEDURE — 17003 DESTRUCT PREMALG LES 2-14: CPT | Performed by: DERMATOLOGY

## 2019-11-12 ENCOUNTER — HOSPITAL ENCOUNTER (OUTPATIENT)
Age: 59
Discharge: HOME | End: 2019-11-12
Payer: COMMERCIAL

## 2019-11-12 ENCOUNTER — HOSPITAL ENCOUNTER (OUTPATIENT)
Dept: GENERAL RADIOLOGY | Age: 59
Discharge: HOME | End: 2019-11-12
Payer: COMMERCIAL

## 2019-11-12 DIAGNOSIS — R10.31 ABDOMINAL PAIN, ACUTE, BILATERAL LOWER QUADRANT: ICD-10-CM

## 2019-11-12 DIAGNOSIS — R10.32 ABDOMINAL PAIN, ACUTE, BILATERAL LOWER QUADRANT: ICD-10-CM

## 2019-11-12 LAB
A/G RATIO: 1.5 (ref 1.1–2.2)
ALBUMIN SERPL-MCNC: 4.8 G/DL (ref 3.4–5)
ALP BLD-CCNC: 62 U/L (ref 40–129)
ALT SERPL-CCNC: 32 U/L (ref 10–40)
ANION GAP SERPL CALCULATED.3IONS-SCNC: 14 MMOL/L (ref 3–16)
AST SERPL-CCNC: 23 U/L (ref 15–37)
BASOPHILS ABSOLUTE: 0 K/UL (ref 0–0.2)
BASOPHILS RELATIVE PERCENT: 0.7 %
BILIRUB SERPL-MCNC: 0.5 MG/DL (ref 0–1)
BUN BLDV-MCNC: 16 MG/DL (ref 7–20)
CALCIUM SERPL-MCNC: 10.2 MG/DL (ref 8.3–10.6)
CHLORIDE BLD-SCNC: 102 MMOL/L (ref 99–110)
CO2: 24 MMOL/L (ref 21–32)
CREAT SERPL-MCNC: 1 MG/DL (ref 0.8–1.3)
EOSINOPHILS ABSOLUTE: 0.1 K/UL (ref 0–0.6)
EOSINOPHILS RELATIVE PERCENT: 0.8 %
GFR AFRICAN AMERICAN: >60
GFR NON-AFRICAN AMERICAN: >60
GLOBULIN: 3.2 G/DL
GLUCOSE BLD-MCNC: 153 MG/DL (ref 70–99)
HCT VFR BLD CALC: 43.1 % (ref 40.5–52.5)
HEMOGLOBIN: 14.5 G/DL (ref 13.5–17.5)
INR BLD: 1.02 (ref 0.86–1.14)
LIPASE: 6 U/L (ref 13–60)
LYMPHOCYTES ABSOLUTE: 2.4 K/UL (ref 1–5.1)
LYMPHOCYTES RELATIVE PERCENT: 36.1 %
MCH RBC QN AUTO: 30.5 PG (ref 26–34)
MCHC RBC AUTO-ENTMCNC: 33.6 G/DL (ref 31–36)
MCV RBC AUTO: 91 FL (ref 80–100)
MONOCYTES ABSOLUTE: 0.4 K/UL (ref 0–1.3)
MONOCYTES RELATIVE PERCENT: 6.4 %
NEUTROPHILS ABSOLUTE: 3.8 K/UL (ref 1.7–7.7)
NEUTROPHILS RELATIVE PERCENT: 56 %
PDW BLD-RTO: 13.4 % (ref 12.4–15.4)
PLATELET # BLD: 173 K/UL (ref 135–450)
PMV BLD AUTO: 10.1 FL (ref 5–10.5)
POTASSIUM SERPL-SCNC: 5.2 MMOL/L (ref 3.5–5.1)
PROTHROMBIN TIME: 11.6 SEC (ref 9.8–13)
RBC # BLD: 4.74 M/UL (ref 4.2–5.9)
SODIUM BLD-SCNC: 140 MMOL/L (ref 136–145)
TOTAL PROTEIN: 8 G/DL (ref 6.4–8.2)
WBC # BLD: 6.7 K/UL (ref 4–11)

## 2019-11-12 PROCEDURE — 74018 RADEX ABDOMEN 1 VIEW: CPT

## 2019-11-12 PROCEDURE — 85025 COMPLETE CBC W/AUTO DIFF WBC: CPT

## 2019-11-12 PROCEDURE — 80053 COMPREHEN METABOLIC PANEL: CPT

## 2019-11-12 PROCEDURE — 83690 ASSAY OF LIPASE: CPT

## 2019-11-12 PROCEDURE — 36415 COLL VENOUS BLD VENIPUNCTURE: CPT

## 2019-11-12 PROCEDURE — 85610 PROTHROMBIN TIME: CPT

## 2019-11-15 NOTE — PROGRESS NOTES
DO    Medication pantoprazole (PROTONIX) 40 MG tablet, Sig TAKE 1 TABLET BY MOUTH DAILY, Taking? Yes, Authorizing Provider Miguel Snow, DO    Medication dicyclomine (BENTYL) 10 MG capsule, Sig , Taking? Yes, Authorizing Provider Historical Provider, MD    Medication aspirin 81 MG chewable tablet, Sig Take 81 mg by mouth daily, Taking? Yes, Authorizing Provider Historical Provider, MD    Medication Blood Glucose Monitoring Suppl (ACCU-CHEK AMA PLUS) W/DEVICE KIT, Sig USE AS DIRECTED, Taking? Yes, Authorizing Provider Barb Cage DO      Past Medical History:  No date: Chronic back pain  No date: Chronic pain  No date: Diverticulitis  No date: DVT (deep venous thrombosis) (HCC)  No date: Hyperlipidemia  No date: Irritable bowel syndrome  No date: Pain medication agreement signed      Comment: discontued care due eye surgery/imbilical                hernia surgery -  No date: Type II or unspecified type diabetes mellitus *  No date: Umbilical hernia      Comment: having surgery 11/23          Review of Systems   Constitutional: Negative for unexpected weight change. HENT: Positive for ear pain and hearing loss. Eyes: Negative for visual disturbance. Respiratory: Negative for cough and shortness of breath. Cardiovascular: Negative for chest pain and palpitations. Gastrointestinal: Negative for abdominal pain and blood in stool. Genitourinary: Negative for difficulty urinating. Musculoskeletal: Positive for arthralgias. Neurological: Negative for headaches. Psychiatric/Behavioral: Positive for sleep disturbance. The patient is not nervous/anxious. Objective:   Physical Exam   Constitutional: He is oriented to person, place, and time. He appears well-developed and well-nourished. HENT:   Head: Normocephalic. Minimal debris Lt canal   Eyes: Conjunctivae are normal.   Neck: Neck supple. Carotid bruit is not present. No thyromegaly present.    Cardiovascular: Normal rate, regular rhythm, normal heart sounds and intact distal pulses. Pulmonary/Chest: Effort normal and breath sounds normal.   Abdominal: Soft. He exhibits no distension and no mass. There is no tenderness. Musculoskeletal: Normal range of motion. He exhibits no edema. Lymphadenopathy:     He has no cervical adenopathy. Neurological: He is alert and oriented to person, place, and time. Skin: Skin is warm and dry. Psychiatric: He has a normal mood and affect. His behavior is normal. Judgment and thought content normal.       Assessment:      1. Adjustment insomnia    2. Type 2 diabetes mellitus without complication, unspecified long term insulin use status ([facility] [address]    3. Essential hypertension    4. ETD (Eustachian tube dysfunction), left            Plan:      Brandee Cowan was seen today for 3 month follow-up, diabetes, hypertension and cerumen impaction.     Diagnoses and all orders for this visit:    Adjustment insomnia  Meds as needed-keep active  Type 2 diabetes mellitus without complication, unspecified long term insulin use status (HCC)  -     POCT glycosylated hemoglobin (Hb A1C)  AIC 7.9(8.7)-continue meds-lower carbs  Essential hypertension  Continue meds-JEFF diet  ETD (Eustachian tube dysfunction), left   sudafed if needed    See me 3 mos Strong peripheral pulses/Capillary refill less/equal to 2 seconds

## 2019-12-03 ENCOUNTER — TELEPHONE (OUTPATIENT)
Dept: FAMILY MEDICINE CLINIC | Age: 59
End: 2019-12-03

## 2019-12-09 ENCOUNTER — OFFICE VISIT (OUTPATIENT)
Dept: FAMILY MEDICINE CLINIC | Age: 59
End: 2019-12-09
Payer: COMMERCIAL

## 2019-12-09 VITALS
DIASTOLIC BLOOD PRESSURE: 72 MMHG | WEIGHT: 243 LBS | OXYGEN SATURATION: 96 % | HEART RATE: 100 BPM | HEIGHT: 72 IN | SYSTOLIC BLOOD PRESSURE: 130 MMHG | TEMPERATURE: 97.6 F | BODY MASS INDEX: 32.91 KG/M2

## 2019-12-09 DIAGNOSIS — F51.02 ADJUSTMENT INSOMNIA: ICD-10-CM

## 2019-12-09 DIAGNOSIS — Z79.4 TYPE 2 DIABETES MELLITUS WITHOUT COMPLICATION, WITH LONG-TERM CURRENT USE OF INSULIN (HCC): Primary | ICD-10-CM

## 2019-12-09 DIAGNOSIS — E11.9 TYPE 2 DIABETES MELLITUS WITHOUT COMPLICATION, WITH LONG-TERM CURRENT USE OF INSULIN (HCC): Primary | ICD-10-CM

## 2019-12-09 DIAGNOSIS — F41.9 ANXIETY: ICD-10-CM

## 2019-12-09 DIAGNOSIS — J01.90 ACUTE NON-RECURRENT SINUSITIS, UNSPECIFIED LOCATION: ICD-10-CM

## 2019-12-09 LAB — HBA1C MFR BLD: 8.6 %

## 2019-12-09 PROCEDURE — 83036 HEMOGLOBIN GLYCOSYLATED A1C: CPT | Performed by: FAMILY MEDICINE

## 2019-12-09 PROCEDURE — 99214 OFFICE O/P EST MOD 30 MIN: CPT | Performed by: FAMILY MEDICINE

## 2019-12-09 RX ORDER — AZITHROMYCIN 250 MG/1
250 TABLET, FILM COATED ORAL SEE ADMIN INSTRUCTIONS
Qty: 6 TABLET | Refills: 0 | Status: SHIPPED | OUTPATIENT
Start: 2019-12-09 | End: 2019-12-14

## 2019-12-09 ASSESSMENT — ENCOUNTER SYMPTOMS
SHORTNESS OF BREATH: 1
COUGH: 1
BACK PAIN: 1
DIARRHEA: 0
VOMITING: 0
NAUSEA: 0

## 2019-12-10 ENCOUNTER — TELEPHONE (OUTPATIENT)
Dept: FAMILY MEDICINE CLINIC | Age: 59
End: 2019-12-10

## 2019-12-10 DIAGNOSIS — R35.0 URINARY FREQUENCY: Primary | ICD-10-CM

## 2019-12-10 RX ORDER — DEXTROMETHORPHAN HYDROBROMIDE AND PROMETHAZINE HYDROCHLORIDE 15; 6.25 MG/5ML; MG/5ML
5 SYRUP ORAL 4 TIMES DAILY PRN
Qty: 180 ML | Refills: 0 | Status: SHIPPED | OUTPATIENT
Start: 2019-12-10 | End: 2019-12-17

## 2019-12-11 ENCOUNTER — NURSE ONLY (OUTPATIENT)
Dept: FAMILY MEDICINE CLINIC | Age: 59
End: 2019-12-11

## 2019-12-11 DIAGNOSIS — R35.0 URINARY FREQUENCY: ICD-10-CM

## 2019-12-11 LAB
BILIRUBIN URINE: NEGATIVE
BLOOD, URINE: NEGATIVE
CLARITY: CLEAR
COLOR: YELLOW
EPITHELIAL CELLS, UA: 0 /HPF (ref 0–5)
GLUCOSE URINE: NEGATIVE MG/DL
HYALINE CASTS: 3 /LPF (ref 0–8)
KETONES, URINE: NEGATIVE MG/DL
LEUKOCYTE ESTERASE, URINE: NEGATIVE
MICROSCOPIC EXAMINATION: NORMAL
NITRITE, URINE: NEGATIVE
PH UA: 5.5 (ref 5–8)
PROTEIN UA: NEGATIVE MG/DL
RBC UA: 1 /HPF (ref 0–4)
SPECIFIC GRAVITY UA: 1.02 (ref 1–1.03)
URINE TYPE: NORMAL
UROBILINOGEN, URINE: 0.2 E.U./DL
WBC UA: 0 /HPF (ref 0–5)

## 2019-12-11 RX ORDER — TAMSULOSIN HYDROCHLORIDE 0.4 MG/1
0.4 CAPSULE ORAL DAILY
Qty: 30 CAPSULE | Refills: 1 | Status: SHIPPED | OUTPATIENT
Start: 2019-12-11 | End: 2020-01-17 | Stop reason: SDUPTHER

## 2019-12-12 ENCOUNTER — OFFICE VISIT (OUTPATIENT)
Dept: FAMILY MEDICINE CLINIC | Age: 59
End: 2019-12-12
Payer: COMMERCIAL

## 2019-12-12 VITALS
SYSTOLIC BLOOD PRESSURE: 130 MMHG | BODY MASS INDEX: 32.91 KG/M2 | DIASTOLIC BLOOD PRESSURE: 78 MMHG | HEART RATE: 62 BPM | HEIGHT: 72 IN | WEIGHT: 243 LBS | OXYGEN SATURATION: 92 %

## 2019-12-12 DIAGNOSIS — K58.0 IRRITABLE BOWEL SYNDROME WITH DIARRHEA: ICD-10-CM

## 2019-12-12 DIAGNOSIS — R35.0 URINARY FREQUENCY: Primary | ICD-10-CM

## 2019-12-12 DIAGNOSIS — I10 ESSENTIAL HYPERTENSION: ICD-10-CM

## 2019-12-12 PROCEDURE — 99214 OFFICE O/P EST MOD 30 MIN: CPT | Performed by: FAMILY MEDICINE

## 2019-12-12 ASSESSMENT — ENCOUNTER SYMPTOMS
BACK PAIN: 1
DIARRHEA: 1
ABDOMINAL PAIN: 1
SHORTNESS OF BREATH: 0
COUGH: 1

## 2019-12-16 ENCOUNTER — TELEPHONE (OUTPATIENT)
Dept: FAMILY MEDICINE CLINIC | Age: 59
End: 2019-12-16

## 2019-12-16 RX ORDER — AZITHROMYCIN 250 MG/1
250 TABLET, FILM COATED ORAL SEE ADMIN INSTRUCTIONS
Qty: 6 TABLET | Refills: 0 | Status: SHIPPED | OUTPATIENT
Start: 2019-12-16 | End: 2020-08-18 | Stop reason: SDUPTHER

## 2019-12-26 ENCOUNTER — TELEPHONE (OUTPATIENT)
Dept: FAMILY MEDICINE CLINIC | Age: 59
End: 2019-12-26

## 2019-12-26 ENCOUNTER — HOSPITAL ENCOUNTER (OUTPATIENT)
Dept: VASCULAR LAB | Age: 59
Discharge: HOME | End: 2019-12-26
Payer: COMMERCIAL

## 2019-12-26 ENCOUNTER — OFFICE VISIT (OUTPATIENT)
Dept: FAMILY MEDICINE CLINIC | Age: 59
End: 2019-12-26
Payer: COMMERCIAL

## 2019-12-26 VITALS
DIASTOLIC BLOOD PRESSURE: 82 MMHG | OXYGEN SATURATION: 98 % | TEMPERATURE: 97.9 F | SYSTOLIC BLOOD PRESSURE: 130 MMHG | HEART RATE: 86 BPM

## 2019-12-26 DIAGNOSIS — M79.605 LEFT LEG PAIN: ICD-10-CM

## 2019-12-26 DIAGNOSIS — Z86.718 HISTORY OF DEEP VENOUS THROMBOSIS (DVT) OF DISTAL VEIN OF LEFT LOWER EXTREMITY: ICD-10-CM

## 2019-12-26 DIAGNOSIS — I83.92 VARICOSE VEINS OF LEFT LOWER EXTREMITY, UNSPECIFIED WHETHER COMPLICATED: ICD-10-CM

## 2019-12-26 PROCEDURE — 99213 OFFICE O/P EST LOW 20 MIN: CPT | Performed by: NURSE PRACTITIONER

## 2019-12-26 PROCEDURE — 93971 EXTREMITY STUDY: CPT

## 2019-12-26 ASSESSMENT — ENCOUNTER SYMPTOMS
COLOR CHANGE: 0
SHORTNESS OF BREATH: 0

## 2020-01-03 ENCOUNTER — TELEPHONE (OUTPATIENT)
Dept: FAMILY MEDICINE CLINIC | Age: 60
End: 2020-01-03

## 2020-01-03 RX ORDER — TAMSULOSIN HYDROCHLORIDE 0.4 MG/1
CAPSULE ORAL
Qty: 30 CAPSULE | Refills: 1 | OUTPATIENT
Start: 2020-01-03

## 2020-01-16 NOTE — TELEPHONE ENCOUNTER
Last Seen: 12/26/2019    Last Written: 12/16/90 40 tab R-2    Last UDS: 12/19/17    Med Agreement Signed On: do not see one    Next Appointment: 3/16/2020    Requested Prescriptions     Pending Prescriptions Disp Refills    diazepam (VALIUM) 10 MG tablet 40 tablet 2     Sig: TAKE 1 AND 1/2 TABLETS BY MOUTH DAILY, TO LAST 30 DAYS.

## 2020-01-16 NOTE — TELEPHONE ENCOUNTER
Refill Request - Controlled Substance    Last Seen: 2019       Last Written: 19    Last UDS:     OARRS Run On:     Med Agreement Signed On:     Next Appointment: 3/16/2020    Requested Prescriptions     Pending Prescriptions Disp Refills    tamsulosin (FLOMAX) 0.4 MG capsule 30 capsule 1     Sig: Take 1 capsule by mouth daily    pantoprazole (PROTONIX) 40 MG tablet 90 tablet 2     Sig: TAKE 1 TABLET BY MOUTH DAILY    insulin aspart (NOVOLOG FLEXPEN) 100 UNIT/ML injection pen 30 mL 5     Sig: Inject up to 15 units subcutaneously twice a day per sliding scale    metFORMIN (GLUCOPHAGE) 1000 MG tablet 180 tablet 1     Sig: Take 1 tablet by mouth twice a day    lisinopril (PRINIVIL;ZESTRIL) 20 MG tablet 90 tablet 1     Sig: Take 1 tablet by mouth every day    insulin glargine (LANTUS SOLOSTAR) 100 UNIT/ML injection pen 60 mL 5     Sig: Inject 70 units subcutaneously nightly (lantus to replace basaglar)    glipiZIDE (GLUCOTROL) 5 MG tablet 270 tablet 1    gabapentin (NEURONTIN) 100 MG capsule 90 capsule 3     Sig: TAKE 1 CAPSULE BY MOUTH 3 TIMES DAILY    fluticasone (FLONASE) 50 MCG/ACT nasal spray 1 Bottle 1     Sig: SPRAY 2 SPRAYS INTO EACH NOSTRIL EVERY DAY    blood glucose test strips (ACCU-CHEK COMFORT CURVE) strip 300 each 3     Si each by In Vitro route daily As needed.     Insulin Pen Needle (B-D UF III MINI PEN NEEDLES) 31G X 5 MM MISC 100 each 3     Sig: Use up to 3 times a day    atorvastatin (LIPITOR) 40 MG tablet 90 tablet 3     Sig: Take 1 tablet by mouth every day    ACCU-CHEK MULTICLIX LANCETS MISC 300 each 3     Si each by Does not apply route 3 times daily

## 2020-01-17 RX ORDER — FLUTICASONE PROPIONATE 50 MCG
SPRAY, SUSPENSION (ML) NASAL
Qty: 1 BOTTLE | Refills: 1 | Status: SHIPPED | OUTPATIENT
Start: 2020-01-17 | End: 2020-03-24

## 2020-01-17 RX ORDER — GLIPIZIDE 5 MG/1
TABLET ORAL
Qty: 270 TABLET | Refills: 1 | Status: SHIPPED | OUTPATIENT
Start: 2020-01-17 | End: 2020-07-21 | Stop reason: SDUPTHER

## 2020-01-17 RX ORDER — DIAZEPAM 10 MG/1
TABLET ORAL
Qty: 40 TABLET | Refills: 2 | OUTPATIENT
Start: 2020-01-17

## 2020-01-17 RX ORDER — PANTOPRAZOLE SODIUM 40 MG/1
TABLET, DELAYED RELEASE ORAL
Qty: 90 TABLET | Refills: 2 | Status: SHIPPED | OUTPATIENT
Start: 2020-01-17 | End: 2020-11-19 | Stop reason: SDUPTHER

## 2020-01-17 RX ORDER — LANCETS
1 EACH MISCELLANEOUS 3 TIMES DAILY
Qty: 300 EACH | Refills: 3 | Status: SHIPPED | OUTPATIENT
Start: 2020-01-17 | End: 2021-02-12

## 2020-01-17 RX ORDER — LISINOPRIL 20 MG/1
TABLET ORAL
Qty: 90 TABLET | Refills: 1 | Status: SHIPPED | OUTPATIENT
Start: 2020-01-17 | End: 2021-01-27 | Stop reason: ALTCHOICE

## 2020-01-17 RX ORDER — TAMSULOSIN HYDROCHLORIDE 0.4 MG/1
1 CAPSULE ORAL DAILY
Qty: 90 CAPSULE | Refills: 1 | Status: SHIPPED | OUTPATIENT
Start: 2020-01-17 | End: 2020-02-12 | Stop reason: SDUPTHER

## 2020-01-17 RX ORDER — ATORVASTATIN CALCIUM 40 MG/1
TABLET, FILM COATED ORAL
Qty: 90 TABLET | Refills: 3 | Status: SHIPPED | OUTPATIENT
Start: 2020-01-17 | End: 2021-02-06 | Stop reason: SDUPTHER

## 2020-01-17 RX ORDER — GABAPENTIN 100 MG/1
CAPSULE ORAL
Qty: 90 CAPSULE | Refills: 5 | Status: SHIPPED | OUTPATIENT
Start: 2020-01-17 | End: 2020-10-06

## 2020-01-21 ENCOUNTER — TELEPHONE (OUTPATIENT)
Dept: FAMILY MEDICINE CLINIC | Age: 60
End: 2020-01-21

## 2020-01-21 NOTE — TELEPHONE ENCOUNTER
Gabapentin, needs to be 270 tablets. How many refills do you want? Pharmacy asking about medications, Changed needles to read 3 needles daily and need to fill 300 needles with 2 refills. Fluticasone changed to 3 bottles with 2 refills. Test strips and lancets, needs to read 3 x a day with 300 filled, would that be enough to cover the p.r.n. order. 90 days with 2 refills. Tamsulosin, 3 capsules daily would need to be 270 tablets and 2 refills. Changed.

## 2020-01-22 ENCOUNTER — TELEPHONE (OUTPATIENT)
Dept: FAMILY MEDICINE CLINIC | Age: 60
End: 2020-01-22

## 2020-01-27 ENCOUNTER — TELEPHONE (OUTPATIENT)
Dept: FAMILY MEDICINE CLINIC | Age: 60
End: 2020-01-27

## 2020-01-27 NOTE — TELEPHONE ENCOUNTER
Submitted PA for ACCU-CHEK PLUS STRIPS  Via CMM Key: [id number] STATUS: APPROVED. Please notify patient thank you.

## 2020-02-05 ENCOUNTER — TELEPHONE (OUTPATIENT)
Dept: FAMILY MEDICINE CLINIC | Age: 60
End: 2020-02-05

## 2020-02-10 NOTE — TELEPHONE ENCOUNTER
Patient called. Needs tamsulosin 0.4 mg 1 cap daily. Ins will not cover as 3 caps daily. Has to be a 90 day supply with 3 refills. Requesting a new script be sent in. DISPLAY PLAN FREE TEXT DISPLAY PLAN FREE TEXT DISPLAY PLAN FREE TEXT DISPLAY PLAN FREE TEXT

## 2020-02-12 RX ORDER — TAMSULOSIN HYDROCHLORIDE 0.4 MG/1
CAPSULE ORAL
Qty: 180 CAPSULE | Refills: 1 | Status: SHIPPED | OUTPATIENT
Start: 2020-02-12 | End: 2020-03-09

## 2020-02-18 ENCOUNTER — TELEPHONE (OUTPATIENT)
Dept: FAMILY MEDICINE CLINIC | Age: 60
End: 2020-02-18

## 2020-03-15 ENCOUNTER — TELEPHONE (OUTPATIENT)
Dept: FAMILY MEDICINE CLINIC | Age: 60
End: 2020-03-15

## 2020-03-15 DIAGNOSIS — F41.9 ANXIETY: ICD-10-CM

## 2020-03-17 ENCOUNTER — TELEPHONE (OUTPATIENT)
Dept: FAMILY MEDICINE CLINIC | Age: 60
End: 2020-03-17

## 2020-03-17 RX ORDER — DIAZEPAM 10 MG/1
TABLET ORAL
Qty: 40 TABLET | Refills: 0 | Status: SHIPPED | OUTPATIENT
Start: 2020-03-17 | End: 2020-04-20

## 2020-03-17 NOTE — TELEPHONE ENCOUNTER
Try and keep his distance from people-wash hands frequently and stay home when not working. Routing comment      Copied Dr. Perla Andrew routing comment in Epic so it will stay in permanent comments   [phone] (home)   I contacted pt and informed him of PCP recommendations.

## 2020-03-24 RX ORDER — FLUTICASONE PROPIONATE 50 MCG
SPRAY, SUSPENSION (ML) NASAL
Qty: 1 BOTTLE | Refills: 1 | Status: SHIPPED | OUTPATIENT
Start: 2020-03-24 | End: 2020-04-21

## 2020-03-24 NOTE — TELEPHONE ENCOUNTER
Refill Request     Last Seen: 12/26/19    Last Written: 1/17/20 1 bottle 1 refill    Next Appointment:   Future Appointments   Date Time Provider Barby Katelyn   4/6/2020  2:30 PM DO LUIS M Darnell   9/14/2020  9:00 AM Edward Carmona MD [facility]             Requested Prescriptions     Pending Prescriptions Disp Refills    fluticasone (FLONASE) 50 MCG/ACT nasal spray [Pharmacy Med Name: FLUTICASONE PROP 50 MCG SPRAY] 1 Bottle 1     Sig: SPRAY 2 SPRAYS INTO EACH NOSTRIL EVERY DAY

## 2020-04-01 ENCOUNTER — TELEPHONE (OUTPATIENT)
Dept: FAMILY MEDICINE CLINIC | Age: 60
End: 2020-04-01

## 2020-04-06 ENCOUNTER — VIRTUAL VISIT (OUTPATIENT)
Dept: FAMILY MEDICINE CLINIC | Age: 60
End: 2020-04-06
Payer: COMMERCIAL

## 2020-04-06 PROCEDURE — 99214 OFFICE O/P EST MOD 30 MIN: CPT | Performed by: FAMILY MEDICINE

## 2020-04-06 ASSESSMENT — ENCOUNTER SYMPTOMS
ABDOMINAL PAIN: 0
BACK PAIN: 1
BLOOD IN STOOL: 0
SHORTNESS OF BREATH: 0
COUGH: 0

## 2020-04-06 NOTE — PROGRESS NOTES
Subjective:      Patient ID: Juan Jose Mccann is a 62 y.o. male. HPI  This is a [place] video visit for several medical issues. Insomnia/anxiety- Valium working very well for insomnia/anxiety when needed-40 tablets last him a good month. Hypertension-he states his blood pressure 126/81 today and his weight is 239. Overall he states he is doing very well. 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 He states he does not stop anywhere on the way to work or on the way home.     Prior to Visit Medications :  Medication fluticasone (FLONASE) 50 MCG/ACT nasal spray, Sig SPRAY 2 SPRAYS INTO EACH NOSTRIL EVERY DAY, Taking? , Authorizing Provider Miguel Snow, DO    Medication diazePAM (VALIUM) 10 MG tablet, Sig TAKE 1 AND 1/2 TABLETS BY MOUTH DAILY, TO LAST 30 DAYS., Taking? , Authorizing Provider Miguel Snow, DO    Medication tamsulosin (FLOMAX) 0.4 MG capsule, Sig 1 bid, Taking? , Authorizing Provider Miguel Snow, DO    Medication pantoprazole (PROTONIX) 40 MG tablet, Sig TAKE 1 TABLET BY MOUTH DAILY, Taking? , Authorizing Provider Liam Snow, DO    Medication insulin aspart (NOVOLOG FLEXPEN) 100 UNIT/ML injection pen, Sig Inject up to 15 units subcutaneously twice a day per sliding scale, Taking? , Authorizing Provider Miguel Snow, DO    Medication metFORMIN (GLUCOPHAGE) 1000 MG tablet, Sig Take 1 tablet by mouth twice a day, Taking? , Authorizing Provider Miguel Snow, DO    Medication lisinopril (PRINIVIL;ZESTRIL) 20 MG tablet, Sig Take 1 tablet by mouth every day, Taking? , Authorizing Provider Miguel Snow, DO    Medication insulin glargine (LANTUS SOLOSTAR) 100 UNIT/ML injection pen, Sig Inject 70 units subcutaneously nightly (lantus to replace basaglar), Taking? , Authorizing Provider Miguel Snow, DO    Medication glipiZIDE (GLUCOTROL) 5 orders for this visit:    Adjustment insomnia  Continue medications and reduce when possible. Stay as active as possible. Anxiety  As above. Type 2 diabetes mellitus without complication, with long-term current use of insulin (HCC)  Last A1c was 8.6 and prior to that was 7.6. Continue medications and work on weight loss by lowering carbs and increasing activity.   Essential hypertension  Continue medications and no added salt diet-work on weight loss as above    See me 3 months  Miguel Snow, DO

## 2020-04-19 ENCOUNTER — TELEPHONE (OUTPATIENT)
Dept: FAMILY MEDICINE CLINIC | Age: 60
End: 2020-04-19

## 2020-04-20 RX ORDER — DIAZEPAM 10 MG/1
TABLET ORAL
Qty: 40 TABLET | Refills: 0 | Status: SHIPPED | OUTPATIENT
Start: 2020-04-20 | End: 2020-05-26

## 2020-04-20 NOTE — TELEPHONE ENCOUNTER
Refill Request - Controlled Substance    Last Seen: 12/26/2019       Last Written: 3/17/2020    Last UDS: 12/19/2017    OARRS Run On: 6/3/19    Med Agreement Signed On: 1/4/2018    Next Appointment: Visit date not found        Requested Prescriptions     Pending Prescriptions Disp Refills    diazePAM (VALIUM) 10 MG tablet [Pharmacy Med Name: DIAZEPAM 10 MG TABLET] 40 tablet 0     Sig: TAKE 1 AND 1/2 TABLETS BY MOUTH DAILY, TO LAST 30 DAYS.

## 2020-04-21 RX ORDER — FLUTICASONE PROPIONATE 50 MCG
SPRAY, SUSPENSION (ML) NASAL
Qty: 1 BOTTLE | Refills: 1 | Status: SHIPPED | OUTPATIENT
Start: 2020-04-21 | End: 2020-05-19

## 2020-05-19 RX ORDER — FLUTICASONE PROPIONATE 50 MCG
SPRAY, SUSPENSION (ML) NASAL
Qty: 1 BOTTLE | Refills: 1 | Status: SHIPPED | OUTPATIENT
Start: 2020-05-19 | End: 2020-06-18

## 2020-05-26 RX ORDER — DIAZEPAM 10 MG/1
TABLET ORAL
Qty: 40 TABLET | Refills: 0 | Status: SHIPPED | OUTPATIENT
Start: 2020-05-26 | End: 2020-06-25

## 2020-05-26 NOTE — TELEPHONE ENCOUNTER
Refill Request     Last Seen: 12/26/2019    Last Written: #40 [id number]  4/20/2020    Next Appointment:   Future Appointments   Date Time Provider Barby Baldwini   7/6/2020  9:30 AM DO LUIS M Jean Baptiste   9/14/2020  9:00 AM Brenda Mendoza MD [facility]             Requested Prescriptions     Pending Prescriptions Disp Refills    diazePAM (VALIUM) 10 MG tablet [Pharmacy Med Name: DIAZEPAM 10 MG TABLET] 40 tablet 0     Sig: TAKE 1.5 TABLETS BY MOUTH DAILY AS NEEDED *TO LAST 30 DAYS*

## 2020-06-18 RX ORDER — FLUTICASONE PROPIONATE 50 MCG
SPRAY, SUSPENSION (ML) NASAL
Qty: 1 BOTTLE | Refills: 1 | Status: SHIPPED | OUTPATIENT
Start: 2020-06-18 | End: 2020-07-20

## 2020-06-25 RX ORDER — DIAZEPAM 10 MG/1
TABLET ORAL
Qty: 40 TABLET | Refills: 0 | Status: SHIPPED | OUTPATIENT
Start: 2020-06-25 | End: 2020-07-31

## 2020-06-26 ENCOUNTER — VIRTUAL VISIT (OUTPATIENT)
Dept: FAMILY MEDICINE CLINIC | Age: 60
End: 2020-06-26
Payer: COMMERCIAL

## 2020-06-26 PROCEDURE — 99213 OFFICE O/P EST LOW 20 MIN: CPT | Performed by: PHYSICIAN ASSISTANT

## 2020-06-26 RX ORDER — AMOXICILLIN AND CLAVULANATE POTASSIUM 875; 125 MG/1; MG/1
1 TABLET, FILM COATED ORAL 2 TIMES DAILY
Qty: 20 TABLET | Refills: 0 | Status: SHIPPED | OUTPATIENT
Start: 2020-06-26 | End: 2020-07-06

## 2020-06-26 ASSESSMENT — ENCOUNTER SYMPTOMS
EYE ITCHING: 0
SINUS PRESSURE: 1
SINUS PAIN: 0
EYE PAIN: 0
VOMITING: 0
RHINORRHEA: 0
NAUSEA: 1
SORE THROAT: 1
SHORTNESS OF BREATH: 0
FACIAL SWELLING: 0
TROUBLE SWALLOWING: 0
DIARRHEA: 0
EYE DISCHARGE: 0

## 2020-06-26 ASSESSMENT — PATIENT HEALTH QUESTIONNAIRE - PHQ9
SUM OF ALL RESPONSES TO PHQ QUESTIONS 1-9: 0
SUM OF ALL RESPONSES TO PHQ9 QUESTIONS 1 & 2: 0
SUM OF ALL RESPONSES TO PHQ QUESTIONS 1-9: 0
1. LITTLE INTEREST OR PLEASURE IN DOING THINGS: 0
2. FEELING DOWN, DEPRESSED OR HOPELESS: 0

## 2020-06-26 NOTE — PROGRESS NOTES
2020    TELEHEALTH EVALUATION -- Audio/Visual (During [id number] public health emergency)    HPI:    Khai Teran (:  1960) has requested an audio/video evaluation for the following concern(s):    Pharyngitis and Fatigue:  Severe sore throat yesterday but today his throat feels better. He reports feeling very fatigued, left work today due to that feeling  Associated symptoms: fatigue, lightheaded, upset stomach, left sided submandibular lymphadenopathy , nausea  Denies:  Cough, chest pain, otalgia, vomiting or diarrhea  Used flonase this AM   this morning  Reports that he has had carpet placed in his home that has a very strong odor. He noticed that some of the symptoms started when the carpet was first placed. Review of Systems   Constitutional: Positive for fatigue. Negative for chills and fever. HENT: Positive for sinus pressure and sore throat. Negative for congestion, ear discharge, ear pain, facial swelling, postnasal drip, rhinorrhea, sinus pain, sneezing and trouble swallowing. Eyes: Negative for pain, discharge and itching. Respiratory: Negative for shortness of breath. Cardiovascular: Negative for chest pain. Gastrointestinal: Positive for nausea. Negative for diarrhea and vomiting. Genitourinary: Negative for difficulty urinating and dysuria. Skin: Negative for rash. Neurological: Positive for light-headedness and headaches. Negative for dizziness. Hematological: Positive for adenopathy. Prior to Visit Medications    Medication Sig Taking?  Authorizing Provider   amoxicillin-clavulanate (AUGMENTIN) 875-125 MG per tablet Take 1 tablet by mouth 2 times daily for 10 days Yes CAMILA Kowalski   diazePAM (VALIUM) 10 MG tablet TAKE 1.5 TABLETS BY MOUTH DAILY AS NEEDED *TO LAST 30 DAYS* Yes Miguel Sonw, DO   fluticasone (FLONASE) 50 MCG/ACT nasal spray SPRAY 2 SPRAYS INTO EACH NOSTRIL EVERY DAY Yes Miguel Snow, DO   tamsulosin (FLOMAX) 0.4 MG capsule 1 bid Yes Miguel Snow, DO   pantoprazole (PROTONIX) 40 MG tablet TAKE 1 TABLET BY MOUTH DAILY Yes Miguel Snow DO   insulin aspart (NOVOLOG FLEXPEN) 100 UNIT/ML injection pen Inject up to 15 units subcutaneously twice a day per sliding scale Yes Miguel Snow DO   metFORMIN (GLUCOPHAGE) 1000 MG tablet Take 1 tablet by mouth twice a day Yes Miguel Snow DO   lisinopril (PRINIVIL;ZESTRIL) 20 MG tablet Take 1 tablet by mouth every day Yes Miguel Snow DO   insulin glargine (LANTUS SOLOSTAR) 100 UNIT/ML injection pen Inject 70 units subcutaneously nightly (lantus to replace basaglar) Yes Miguel Snow DO   glipiZIDE (GLUCOTROL) 5 MG tablet 1.5  bid Yes Miguel Snow, DO   blood glucose test strips (ACCU-CHEK COMFORT CURVE) strip 1 each by In Vitro route daily As needed.  Yes Miguel Snow DO   Insulin Pen Needle (B-D UF III MINI PEN NEEDLES) 31G X 5 MM MISC Use up to 3 times a day Yes Miguel Snow DO   atorvastatin (LIPITOR) 40 MG tablet Take 1 tablet by mouth every day Yes Meir Snow DO   ACCU-CHEK MULTICLIX LANCETS MISC 1 each by Does not apply route 3 times daily Yes Miguel Snow DO   Blood Glucose Monitoring Suppl (ACCU-CHEK COMPACT CARE KIT) KIT 1 each by Does not apply route daily Yes Miguel Snow DO   ACCU-CHEK FASTCLIX LANCETS MISC USE 3x TIMES DAILY Yes Meir Snow DO   ONETOUCH DELICA LANCETS FINE MISC Use 3 times a day Yes Miguel Snow DO   glucose monitoring kit (FREESTYLE) monitoring kit 1 kit by Does not apply route daily as needed (once daily) One touch ultra 2 Dx E11.9 Yes Miguel Snow DO   Probiotic Product (PROBIOTIC-10 ULTIMATE PO) Take by mouth Yes Historical Provider, MD   dicyclomine (BENTYL) 10 MG capsule  Yes Historical Provider, MD   aspirin 81 MG chewable tablet Take 81 mg by mouth daily Yes Historical Provider, MD   Blood Glucose Monitoring Suppl (ACCU-CHEK AMA PLUS) W/DEVICE KIT USE AS DIRECTED Yes Miguel Snow, DO   gabapentin (NEURONTIN) 100 MG capsule TAKE 1 CAPSULE

## 2020-06-26 NOTE — Clinical Note
FYI- his symtpoms are vague. I started him on an antibiotic but he does have an appointment scheduled with you already for Monday.

## 2020-07-06 ENCOUNTER — OFFICE VISIT (OUTPATIENT)
Dept: FAMILY MEDICINE CLINIC | Age: 60
End: 2020-07-06
Payer: COMMERCIAL

## 2020-07-06 VITALS
WEIGHT: 240 LBS | RESPIRATION RATE: 18 BRPM | TEMPERATURE: 97.5 F | OXYGEN SATURATION: 98 % | HEIGHT: 71 IN | HEART RATE: 89 BPM | SYSTOLIC BLOOD PRESSURE: 126 MMHG | DIASTOLIC BLOOD PRESSURE: 70 MMHG | BODY MASS INDEX: 33.6 KG/M2

## 2020-07-06 PROCEDURE — 99214 OFFICE O/P EST MOD 30 MIN: CPT | Performed by: FAMILY MEDICINE

## 2020-07-06 ASSESSMENT — ENCOUNTER SYMPTOMS
SHORTNESS OF BREATH: 0
COUGH: 0
CHEST TIGHTNESS: 0
DIARRHEA: 1
BLOOD IN STOOL: 0
ABDOMINAL PAIN: 0

## 2020-07-06 NOTE — PROGRESS NOTES
Subjective:      Patient ID: Wiley Garsia is a 62 y.o. male. HPI  Patient in for 3-month checkup on anxiety issue. H prescription for Valium No. 40 typically last at least 1 month. Hypertension-blood pressure 140/80 or below when he checks it at home or elsewhere. Diabetes- A1c in November of last year was 8.6-his blood sugars are labile and have been for a number of years. Overall he is doing fairly well. Still working and keeping busy and working on getting his weight down another 10 pounds. Prior to Visit Medications :  Medication amoxicillin-clavulanate (AUGMENTIN) 875-125 MG per tablet, Sig Take 1 tablet by mouth 2 times daily for 10 days, Taking? Yes, Authorizing Provider CAMILA Mayen    Medication diazePAM (VALIUM) 10 MG tablet, Sig TAKE 1.5 TABLETS BY MOUTH DAILY AS NEEDED *TO LAST 30 DAYS*, Taking? Yes, Authorizing Provider Miguel Snow, DO    Medication fluticasone (FLONASE) 50 MCG/ACT nasal spray, Sig SPRAY 2 SPRAYS INTO EACH NOSTRIL EVERY DAY, Taking? Yes, Authorizing Provider Miguel Snow, DO    Medication pantoprazole (PROTONIX) 40 MG tablet, Sig TAKE 1 TABLET BY MOUTH DAILY, Taking? Yes, Authorizing Provider Miguel Snow, DO    Medication insulin aspart (NOVOLOG FLEXPEN) 100 UNIT/ML injection pen, Sig Inject up to 15 units subcutaneously twice a day per sliding scale, Taking? Yes, Authorizing Provider Miguel Snow, DO    Medication metFORMIN (GLUCOPHAGE) 1000 MG tablet, Sig Take 1 tablet by mouth twice a day, Taking? Yes, Authorizing Provider Miguel Snow, DO    Medication lisinopril (PRINIVIL;ZESTRIL) 20 MG tablet, Sig Take 1 tablet by mouth every day, Taking? Yes, Authorizing Provider Miguel Snow, DO    Medication insulin glargine (LANTUS SOLOSTAR) 100 UNIT/ML injection pen, Sig Inject 70 units subcutaneously nightly (lantus to replace basaglar), Taking? Yes, Authorizing Provider Miguel Snow, DO    Medication glipiZIDE (GLUCOTROL) 5 MG tablet, Sig 1.5  bid, Taking?  Yes, Authorizing Provider Julio Snow, DO    Medication blood glucose test strips (ACCU-CHEK COMFORT CURVE) strip, Sig 1 each by In Vitro route daily As needed. , Taking? Yes, Authorizing Provider Miguel Snow, DO    Medication Insulin Pen Needle (B-D UF III MINI PEN NEEDLES) 31G X 5 MM MISC, Sig Use up to 3 times a day, Taking? Yes, Authorizing Provider Miguel Snow, DO    Medication atorvastatin (LIPITOR) 40 MG tablet, Sig Take 1 tablet by mouth every day, Taking? Yes, Authorizing Provider Miguel Snow, DO    Medication ACCU-CHEK MULTICLIX LANCETS MISC, Sig 1 each by Does not apply route 3 times daily, Taking? Yes, Authorizing Provider Miguel Snow, DO    Medication Blood Glucose Monitoring Suppl (ACCU-CHEK COMPACT CARE KIT) KIT, Sig 1 each by Does not apply route daily, Taking? Yes, Authorizing Provider Miguel Snow, DO    Medication ACCU-CHEK FASTCLIX LANCETS MISC, Sig USE 3x TIMES DAILY, Taking? Yes, Authorizing Provider Julio Snow, DO    Medication ONETOUCH DELICA LANCETS FINE MISC, Sig Use 3 times a day, Taking? Yes, Authorizing Provider Julio Snow, DO    Medication glucose monitoring kit (FREESTYLE) monitoring kit, Sig 1 kit by Does not apply route daily as needed (once daily) One touch ultra 2 Dx E11.9, Taking? Yes, Authorizing Provider Miguel Snow, DO    Medication Probiotic Product (PROBIOTIC-10 ULTIMATE PO), Sig Take by mouth, Taking? Yes, Authorizing Provider Historical Provider, MD    Medication dicyclomine (BENTYL) 10 MG capsule, Sig , Taking? Yes, Authorizing Provider Historical Provider, MD    Medication aspirin 81 MG chewable tablet, Sig Take 81 mg by mouth daily, Taking? Yes, Authorizing Provider Historical Provider, MD    Medication Blood Glucose Monitoring Suppl (ACCU-CHEK AMA PLUS) W/DEVICE KIT, Sig USE AS DIRECTED, Taking?  Yes, Authorizing Provider Julio Snow, DO    Medication tamsulosin (FLOMAX) 0.4 MG capsule, Sig 1 bid  Patient not taking: Reported on 7/6/2020, Taking? , Authorizing Provider Ardean Aschoff Heindl, DO    Medication gabapentin (NEURONTIN) 100 MG capsule, Sig TAKE 1 CAPSULE BY MOUTH 3 TIMES DAILY, Taking? , Authorizing Provider Dinan Barakat DO      Past Medical History:  No date: Chronic back pain  No date: Chronic pain  No date: Diverticulitis  No date: DVT (deep venous thrombosis) (HCC)  No date: Hyperlipidemia  No date: Irritable bowel syndrome  No date: Pain medication agreement signed      Comment:  discontued care due eye surgery/imbilical hernia surgery               -  No date: Type II or unspecified type diabetes mellitus without   mention of complication, not stated as uncontrolled  No date: Umbilical hernia      Comment:  having surgery 11/23        Review of Systems    Review of Systems   Constitutional: Negative for fever and unexpected weight change. HENT: Negative for congestion and postnasal drip. Eyes: Negative for visual disturbance. Respiratory: Negative for cough, chest tightness and shortness of breath. Cardiovascular: Negative for chest pain, palpitations and leg swelling. Gastrointestinal: Positive for diarrhea. Negative for abdominal pain and blood in stool. He has longstanding abdominal problem has been doing fairly well in the last year or 2-other than going to the bathroom more than he wants to he has not had any major problems with discomfort. He has had a colonoscopy in the last months. Genitourinary: Negative for frequency and hematuria. He sees urologist for urinary issues   Musculoskeletal: Positive for arthralgias. Negative for myalgias. Skin: Negative for rash. Neurological: Negative for tremors and headaches. Psychiatric/Behavioral: Negative for sleep disturbance. The patient is nervous/anxious. Objective:   Physical Exam      Physical Exam  Constitutional:       Appearance: Normal appearance. He is well-developed. He is obese. HENT:      Head: Normocephalic.       Mouth/Throat:      Mouth: Mucous membranes are moist.      Pharynx: Oropharynx is clear. Eyes:      Conjunctiva/sclera: Conjunctivae normal.   Neck:      Musculoskeletal: Neck supple. Thyroid: No thyromegaly. Vascular: No carotid bruit. Cardiovascular:      Rate and Rhythm: Normal rate and regular rhythm. Heart sounds: Normal heart sounds. Pulmonary:      Effort: Pulmonary effort is normal.      Breath sounds: Normal breath sounds. Abdominal:      General: There is no distension. Palpations: Abdomen is soft. There is no mass. Tenderness: There is no abdominal tenderness. Comments: Abdominal scarring significant of previous surgery. Lymphadenopathy:      Cervical: No cervical adenopathy. Skin:     General: Skin is warm and dry. Neurological:      Mental Status: He is alert and oriented to person, place, and time. Psychiatric:         Mood and Affect: Mood normal.         Behavior: Behavior normal.         Thought Content: Thought content normal.         Judgment: Judgment normal.         Assessment:       Diagnosis Orders   1. Anxiety     2. Type 2 diabetes mellitus without complication, with long-term current use of insulin ([address]     3. Essential hypertension           Plan:      [facility] was seen today for medication check, other and discuss medications. Diagnoses and all orders for this visit:    Anxiety  Continue medication as needed and try to reduce when possible-keep as active as possible. Type 2 diabetes mellitus without complication, with long-term current use of insulin ([address]  Continue medication and always be working on weight loss by reducing carbs and increasing activity. Essential hypertension  Continue medications and no added salt diet-work on weight loss as above. See me for a video visit regarding the Valium in 3 months and back in the office in 6 months.      Miguel Snow, DO

## 2020-07-21 RX ORDER — GLIPIZIDE 5 MG/1
TABLET ORAL
Qty: 270 TABLET | Refills: 1 | Status: SHIPPED | OUTPATIENT
Start: 2020-07-21 | End: 2020-10-21

## 2020-07-21 NOTE — TELEPHONE ENCOUNTER
Last office visit 2020     Last written 2020 #90 x 1 refill    Next office visit scheduled 10/6/2020    Requested Prescriptions     Pending Prescriptions Disp Refills    glipiZIDE (GLUCOTROL) 5 MG tablet 270 tablet 1     Si.5  bid    metFORMIN (GLUCOPHAGE) 1000 MG tablet 180 tablet 1     Sig: Take 1 tablet by mouth twice a day

## 2020-07-31 RX ORDER — DIAZEPAM 10 MG/1
TABLET ORAL
Qty: 40 TABLET | Refills: 0 | Status: SHIPPED | OUTPATIENT
Start: 2020-07-31 | End: 2020-09-03

## 2020-08-05 ENCOUNTER — TELEPHONE (OUTPATIENT)
Dept: FAMILY MEDICINE CLINIC | Age: 60
End: 2020-08-05

## 2020-08-05 NOTE — TELEPHONE ENCOUNTER
----- Message from Beryl Cardona sent at 8/5/2020  9:55 AM EDT -----  Subject: Message to Provider    QUESTIONS  Information for Provider? Patient has an upcoming MRI and suffers from   claustrophobia. Patient is requesting something for this that he can take   so that he can complete this MRI. Patient previously attempted to complete   an MRI   but was unable to sit still through the procedure.  ---------------------------------------------------------------------------  --------------  CALL BACK INFO  What is the best way for the office to contact you? OK to leave message on   voicemail  Preferred Call Back Phone Number? [phone]  ---------------------------------------------------------------------------  --------------  SCRIPT ANSWERS  Relationship to Patient?  Self

## 2020-08-05 NOTE — TELEPHONE ENCOUNTER
Pt aware. He states this wont work but he will try it anyway. He wanted something that was quick acting that he could take before the MRI. He states last time he had one he took 2 pills half an hour before hand and it worked just fine but he doesn't remember what the name of the pills were.

## 2020-08-07 ENCOUNTER — TELEPHONE (OUTPATIENT)
Dept: FAMILY MEDICINE CLINIC | Age: 60
End: 2020-08-07

## 2020-08-07 RX ORDER — ALPRAZOLAM 0.5 MG/1
TABLET ORAL
Qty: 6 TABLET | Refills: 0 | Status: SHIPPED | OUTPATIENT
Start: 2020-08-07 | End: 2020-08-10

## 2020-08-07 NOTE — TELEPHONE ENCOUNTER
There are no notes in pt chart that our office gave him anything before his last MRI and pt does not remember what he took.  Please advise on what you recommend medication wise

## 2020-08-07 NOTE — TELEPHONE ENCOUNTER
Duplicate message, left message for patient that dr maldonado sent medication to [facility]

## 2020-08-07 NOTE — TELEPHONE ENCOUNTER
----- Message from Hughie Gottron sent at 8/7/2020  1:22 PM EDT -----  Subject: Message to Provider    QUESTIONS  Information for Provider? Patient is returning a phone call in regards to   getting medication to assist with their upcoming appointment being that   they're claustrophobic.   ---------------------------------------------------------------------------  --------------  [address]  What is the best way for the office to contact you? OK to leave message on   voicemail  Preferred Call Back Phone Number? [phone]  ---------------------------------------------------------------------------  --------------  SCRIPT ANSWERS  Relationship to Patient?  Self

## 2020-08-18 ENCOUNTER — OFFICE VISIT (OUTPATIENT)
Dept: FAMILY MEDICINE CLINIC | Age: 60
End: 2020-08-18
Payer: COMMERCIAL

## 2020-08-18 ENCOUNTER — NURSE TRIAGE (OUTPATIENT)
Dept: OTHER | Facility: CLINIC | Age: 60
End: 2020-08-18

## 2020-08-18 VITALS
OXYGEN SATURATION: 95 % | DIASTOLIC BLOOD PRESSURE: 56 MMHG | HEART RATE: 123 BPM | SYSTOLIC BLOOD PRESSURE: 106 MMHG | TEMPERATURE: 97.6 F

## 2020-08-18 PROCEDURE — 99213 OFFICE O/P EST LOW 20 MIN: CPT | Performed by: PHYSICIAN ASSISTANT

## 2020-08-18 RX ORDER — AZITHROMYCIN 250 MG/1
250 TABLET, FILM COATED ORAL SEE ADMIN INSTRUCTIONS
Qty: 6 TABLET | Refills: 0 | Status: SHIPPED | OUTPATIENT
Start: 2020-08-18 | End: 2020-08-23

## 2020-08-18 ASSESSMENT — ENCOUNTER SYMPTOMS
SORE THROAT: 1
CHEST TIGHTNESS: 1

## 2020-08-18 NOTE — TELEPHONE ENCOUNTER
Reason for Disposition   Patient wants to be seen    Answer Assessment - Initial Assessment Questions  1. LOCATION: \"Where does it hurt? \"       Throat  2. ONSET: \"When did the sinus pain start? \"  (e.g., hours, days)       8/17/20  3. SEVERITY: \"How bad is the pain? \"   (Scale 1-10; mild, moderate or severe)    - MILD (1-3): doesn't interfere with normal activities     - MODERATE (4-7): interferes with normal activities (e.g., work or school) or awakens from sleep    - SEVERE (8-10): excruciating pain and patient unable to do any normal activities         mild  4. RECURRENT SYMPTOM: \"Have you ever had sinus problems before? \" If so, ask: \"When was the last time? \" and \"What happened that time? \"       Yes yearly  5. NASAL CONGESTION: \"Is the nose blocked? \" If so, ask, \"Can you open it or must you breathe through the mouth? \"      Runny nose yesterday-using flonase at this time  6. NASAL DISCHARGE: \"Do you have discharge from your nose? \" If so ask, \"What color? \"      No color  7. FEVER: \"Do you have a fever? \" If so, ask: \"What is it, how was it measured, and when did it start? \"       No  8. OTHER SYMPTOMS: \"Do you have any other symptoms? \" (e.g., sore throat, cough, earache, difficulty breathing)      Sore throat, chills. 9. PREGNANCY: \"Is there any chance you are pregnant? \" \"When was your last menstrual period? \"      No    Protocols used: SINUS PAIN AND CONGESTION-ADULT-[place]    Patient called [place] [facility]) to schedule appointment with red flag complaint; transferred to Nurse Access for triage by [facility]. Pt calls to report symptoms of sinus pain and sore throat. Pt states symptoms started yesterday. Hx of sinus problems yearly. Rates the pain as mild. Denies fevers or breathing difficulty. Informed of disposition. Care advice as documented. Instructed to call back with worsening symptoms. Soft transfer to [place] [facility]) to schedule appointment as recommended.     Please do not respond to the triage nurse through this encounter. Any subsequent communication should be directly with the patient.

## 2020-08-20 ENCOUNTER — NURSE TRIAGE (OUTPATIENT)
Dept: OTHER | Facility: CLINIC | Age: 60
End: 2020-08-20

## 2020-08-20 NOTE — TELEPHONE ENCOUNTER
Received call from [facility], [address], [place]. Patient called in stated when he woke up this morning, 0900, his BS was 300, he took his regular diabetes medications, 36 units of lantus and 8 units of novolog;and around 1030 his BS was 296 and he took another 6 units of novolog;  around 11:30 rechecked his BS and it was 156; rechecked his BS at 1322 back up to 272; also experiencing episodes of dizziness, light headedness, and frequent urination, see triage assessment below. Care Advice provided; patient acknowledged understanding of care advice and is in agreement with plan. Called patient's PCP, spoke with Pramod Jorge, call soft transferred to Pramod Jorge in Dr. Ibarra [facility] office to schedule appointment. Please do not reply to the triage nurse through this encounter. Any subsequent communication should be directly with the patient. Reason for Disposition   Blood glucose > 300 mg/dL (16.7 mmol/L) AND two or more times in a row    Answer Assessment - Initial Assessment Questions  1. BLOOD GLUCOSE: \"What is your blood glucose level? \"       0900 - BS - 300      1030 - BS - 296      1130 - BS - 156      1325 - BS - 272    2. ONSET: \"When did you check the blood glucose? \"      See above  3. USUAL RANGE: \"What is your glucose level usually? \" (e.g., usual fasting morning value, usual evening value)      Morning fasting -       Evening value - 110-115  4. KETONES: \"Do you check for ketones (urine or blood test strips)? \" If yes, ask: \"What does the test show now? \"       NA  5. TYPE 1 or 2:  \"Do you know what type of diabetes you have? \"  (e.g., Type 1, Type 2, Gestational; doesn't know)       DM2  6. INSULIN: \"Do you take insulin? \" \"What type of insulin(s) do you use? What is the mode of delivery? (syringe, pen (e.g., injection or  pump)? \"       Lantus 30-36 BID, Novolog sliding scale as needed  7. DIABETES PILLS: \"Do you take any pills for your diabetes? \" If yes, ask: \"Have you missed taking any pills recently? \"      Metformin, glipized; has not missed any doses  8. OTHER SYMPTOMS: \"Do you have any symptoms? \" (e.g., fever, frequent urination, difficulty breathing, dizziness, weakness, vomiting)      Frequent urination, dizziness, tingling sensation, and lightheaded  9. PREGNANCY: \"Is there any chance you are pregnant? \" \"When was your last menstrual period? \"      NA    Protocols used: DIABETES - HIGH BLOOD SUGAR-ADULT-[place]

## 2020-08-21 ENCOUNTER — TELEPHONE (OUTPATIENT)
Dept: FAMILY MEDICINE CLINIC | Age: 60
End: 2020-08-21

## 2020-08-21 DIAGNOSIS — Z12.5 SPECIAL SCREENING FOR MALIGNANT NEOPLASM OF PROSTATE: ICD-10-CM

## 2020-08-21 DIAGNOSIS — Z79.4 TYPE 2 DIABETES MELLITUS WITHOUT COMPLICATION, WITH LONG-TERM CURRENT USE OF INSULIN (HCC): ICD-10-CM

## 2020-08-21 DIAGNOSIS — E11.9 TYPE 2 DIABETES MELLITUS WITHOUT COMPLICATION, WITH LONG-TERM CURRENT USE OF INSULIN (HCC): ICD-10-CM

## 2020-08-21 LAB
A/G RATIO: 1.7 (ref 1.1–2.2)
ALBUMIN SERPL-MCNC: 4.5 G/DL (ref 3.4–5)
ALP BLD-CCNC: 60 U/L (ref 40–129)
ALT SERPL-CCNC: 31 U/L (ref 10–40)
ANION GAP SERPL CALCULATED.3IONS-SCNC: 18 MMOL/L (ref 3–16)
AST SERPL-CCNC: 24 U/L (ref 15–37)
BASOPHILS ABSOLUTE: 0.1 K/UL (ref 0–0.2)
BASOPHILS RELATIVE PERCENT: 0.7 %
BILIRUB SERPL-MCNC: 0.5 MG/DL (ref 0–1)
BUN BLDV-MCNC: 35 MG/DL (ref 7–20)
CALCIUM SERPL-MCNC: 9.3 MG/DL (ref 8.3–10.6)
CHLORIDE BLD-SCNC: 96 MMOL/L (ref 99–110)
CHOLESTEROL, TOTAL: 123 MG/DL (ref 0–199)
CO2: 20 MMOL/L (ref 21–32)
CREAT SERPL-MCNC: 1.4 MG/DL (ref 0.8–1.3)
EOSINOPHILS ABSOLUTE: 0.1 K/UL (ref 0–0.6)
EOSINOPHILS RELATIVE PERCENT: 1.3 %
GFR AFRICAN AMERICAN: >60
GFR NON-AFRICAN AMERICAN: 51
GLOBULIN: 2.7 G/DL
GLUCOSE BLD-MCNC: 90 MG/DL (ref 70–99)
HCT VFR BLD CALC: 42.5 % (ref 40.5–52.5)
HDLC SERPL-MCNC: 46 MG/DL (ref 40–60)
HEMOGLOBIN: 14.3 G/DL (ref 13.5–17.5)
LDL CHOLESTEROL CALCULATED: 56 MG/DL
LYMPHOCYTES ABSOLUTE: 2.8 K/UL (ref 1–5.1)
LYMPHOCYTES RELATIVE PERCENT: 37.5 %
MCH RBC QN AUTO: 30.7 PG (ref 26–34)
MCHC RBC AUTO-ENTMCNC: 33.6 G/DL (ref 31–36)
MCV RBC AUTO: 91.1 FL (ref 80–100)
MONOCYTES ABSOLUTE: 0.6 K/UL (ref 0–1.3)
MONOCYTES RELATIVE PERCENT: 7.9 %
NEUTROPHILS ABSOLUTE: 3.9 K/UL (ref 1.7–7.7)
NEUTROPHILS RELATIVE PERCENT: 52.6 %
PDW BLD-RTO: 13.6 % (ref 12.4–15.4)
PLATELET # BLD: 171 K/UL (ref 135–450)
PMV BLD AUTO: 9.5 FL (ref 5–10.5)
POTASSIUM SERPL-SCNC: 4.4 MMOL/L (ref 3.5–5.1)
PROSTATE SPECIFIC ANTIGEN: 1.04 NG/ML (ref 0–4)
RBC # BLD: 4.66 M/UL (ref 4.2–5.9)
SODIUM BLD-SCNC: 134 MMOL/L (ref 136–145)
TOTAL PROTEIN: 7.2 G/DL (ref 6.4–8.2)
TRIGL SERPL-MCNC: 103 MG/DL (ref 0–150)
VLDLC SERPL CALC-MCNC: 21 MG/DL
WBC # BLD: 7.4 K/UL (ref 4–11)

## 2020-08-21 NOTE — TELEPHONE ENCOUNTER
Pt called because when he bends down and stands up pt feels like he is going to pass out and says his chest feels heavy like someone is sitting on pts chest. Please Advise

## 2020-08-22 LAB
ESTIMATED AVERAGE GLUCOSE: 182.9 MG/DL
HBA1C MFR BLD: 8 %

## 2020-08-24 ENCOUNTER — OFFICE VISIT (OUTPATIENT)
Dept: CARDIOLOGY CLINIC | Age: 60
End: 2020-08-24
Payer: COMMERCIAL

## 2020-08-24 VITALS
HEIGHT: 72 IN | DIASTOLIC BLOOD PRESSURE: 60 MMHG | OXYGEN SATURATION: 97 % | BODY MASS INDEX: 31.63 KG/M2 | WEIGHT: 233.5 LBS | HEART RATE: 96 BPM | SYSTOLIC BLOOD PRESSURE: 100 MMHG

## 2020-08-24 PROCEDURE — 3052F HG A1C>EQUAL 8.0%<EQUAL 9.0%: CPT | Performed by: INTERNAL MEDICINE

## 2020-08-24 PROCEDURE — 99214 OFFICE O/P EST MOD 30 MIN: CPT | Performed by: INTERNAL MEDICINE

## 2020-08-24 NOTE — PROGRESS NOTES
[address] [facility]   Cardiovascular Evaluation    PATIENT: Harrison Sánchez  DATE: 2020  MRN: <[id number]>  CSN: [id number]  : 1960    Primary Care Doctor: Diann Barakat DO    Reason for evaluation:   Follow-up; Chest Pain; Dizziness; and Shortness of Breath      Subjective:    History of present illness on initial date of evaluation:   Harrison Sánchez is a 62 y.o. patient who presents for follow up. His stress test from 2019 was negative for ischemia. Today he reports that he has been struggling with chronic back pain that he has followed up at [facility] for. A recently woke up one morning not feeling well. He has been having frequent dizzy spells when bending over. He has also be having shortness of breath. Kamala Sos also random cold sweats. He has intentionally lost 30 pounds in the last few months, attempting to manage his diabetes. Patient Active Problem List   Diagnosis    Chronic pain    Hypertension    Type 2 diabetes mellitus without complication (HCC)    Chronic back pain    Irritable bowel syndrome    Mixed hyperlipidemia    DJD (degenerative joint disease), lumbar    Actinic keratoses    Seborrheic keratosis    Hx of abdominal surgery    Umbilical hernia    Chest pain    Left arm pain    Lightheadedness    Sensory disturbance    Pain medication agreement signed    Mild non obstructive coronary artery disease involving native coronary artery of native heart without angina pectoris    Palpitation    Lower abdominal pain    Chronic gastritis without bleeding    Vertigo    Left leg pain    Primary osteoarthritis involving multiple joints         Cardiac Testing: I have reviewed the findings below.   EKG:  ECHO:   STRESS TEST:  CATH:  BYPASS:  VASCULAR:    Past Medical History:   has a past medical history of Chronic back pain, Chronic pain, Diverticulitis, DVT (deep venous thrombosis) ([address] Hyperlipidemia, Irritable bowel syndrome, Pain medication agreement signed, Type II or unspecified type diabetes mellitus without mention of complication, not stated as uncontrolled, and Umbilical hernia. Surgical History:   has a past surgical history that includes Colon surgery; Abdominal adhesion surgery; Tonsillectomy; Colonoscopy; other surgical history (9-26-12); Throat surgery (Bilateral, october 2013); Cataract removal with implant (Left, 11/13/15); hernia repair; Upper gastrointestinal endoscopy (02/10/2017); and Abdomen surgery. Social History:   reports that he quit smoking about 40 years ago. He has a 3.00 pack-year smoking history. He has never used smokeless tobacco. He reports that he does not drink alcohol or use drugs. Family History:  No evidence for sudden cardiac death or premature CAD    Medications:  Reviewed and are listed in nursing record. and/or listed below  Current Outpatient Medications on File Prior to Visit   Medication Sig Dispense Refill    diazePAM (VALIUM) 10 MG tablet TAKE 1.5 TABLETS BY MOUTH DAILY AS NEEDED *TO LAST 30 DAYS* 40 tablet 0    glipiZIDE (GLUCOTROL) 5 MG tablet 1.5  bid 270 tablet 1    metFORMIN (GLUCOPHAGE) 1000 MG tablet Take 1 tablet by mouth twice a day 180 tablet 1    pantoprazole (PROTONIX) 40 MG tablet TAKE 1 TABLET BY MOUTH DAILY 90 tablet 2    insulin aspart (NOVOLOG FLEXPEN) 100 UNIT/ML injection pen Inject up to 15 units subcutaneously twice a day per sliding scale 30 mL 5    lisinopril (PRINIVIL;ZESTRIL) 20 MG tablet Take 1 tablet by mouth every day 90 tablet 1    insulin glargine (LANTUS SOLOSTAR) 100 UNIT/ML injection pen Inject 70 units subcutaneously nightly (lantus to replace basaglar) 60 mL 5    blood glucose test strips (ACCU-CHEK COMFORT CURVE) strip 1 each by In Vitro route daily As needed.  300 each 3    Insulin Pen Needle (B-D UF III MINI PEN NEEDLES) 31G X 5 MM MISC Use up to 3 times a day 100 each 3    atorvastatin (LIPITOR) 40 MG tablet Take 1 tablet by mouth every day 90 tablet 3    temperature intolerance  Breast ROS: deferred  Respiratory ROS: negative for - hemoptysis or stridor  Cardiovascular ROS: negative for - chest pain, dyspnea on exertion or palpitations  Gastrointestinal ROS: no abdominal pain, change in bowel habits, or black or bloody stools  Genito-Urinary ROS: no dysuria, trouble voiding, or hematuria  Musculoskeletal ROS: negative for - gait disturbance, joint pain or joint stiffness  Neurological ROS: negative for - seizures or speech problems  Dermatological ROS: negative for - rash or skin lesion changes      Physical Examination:    Vitals:    08/24/20 1503   BP: 100/60   Pulse: 96   SpO2: 97%    Weight: 233 lb 8 oz (105.9 kg)     Wt Readings from Last 3 Encounters:   08/24/20 233 lb 8 oz (105.9 kg)   07/06/20 240 lb (108.9 kg)   12/12/19 243 lb (110.2 kg)     No intake or output data in the 24 hours ending 08/24/20 1519    General Appearance:  Alert, cooperative, no distress, appears stated age   Head:  Normocephalic, without obvious abnormality, atraumatic   Eyes:  PERRL, conjunctiva/corneas clear       Nose: Nares normal, no drainage or sinus tenderness   Throat: Lips, mucosa, and tongue normal   Neck: Supple, symmetrical, trachea midline, no adenopathy, thyroid: not enlarged, symmetric, no tenderness/mass/nodules, no carotid bruit or JVD       Lungs:   Clear to auscultation bilaterally, respirations unlabored   Chest Wall:  No tenderness or deformity   Heart:  Regular rhythm and normal rate; S1, S2 are normal; no murmur noted; no rub or gallop   Abdomen:   Soft, non-tender, bowel sounds active all four quadrants,  no masses, no organomegaly           Extremities: Extremities normal, atraumatic, no cyanosis or edema   Pulses: 2+ and symmetric   Skin: Skin color, texture, turgor normal, no rashes or lesions   Pysch: Normal mood and affect   Neurologic: Normal gross motor and sensory exam.         Labs  No results for input(s): WBC, HGB, HCT, MCV, PLT in the last 72

## 2020-08-24 NOTE — PATIENT INSTRUCTIONS
Plan:  1. Discussed your symptoms possibly being cause by low blood pressure and polypharmacy (too many medications). Flomax vs Lisinopril. 2. Referral to Endocrinology for management of your diabetes. 3. VL Carotid doppler due to dizziness. 4. Decrease Flomax to one tablet daily. 5. Follow up with me after testing. Your provider has ordered testing for further evaluation. An order/prescription has been included in your paper work.  To schedule outpatient testing, contact Central Scheduling by calling [address] [place] [address] ([phone]).

## 2020-08-28 ENCOUNTER — TELEPHONE (OUTPATIENT)
Dept: CARDIOLOGY CLINIC | Age: 60
End: 2020-08-28

## 2020-08-28 ENCOUNTER — HOSPITAL ENCOUNTER (OUTPATIENT)
Dept: VASCULAR LAB | Age: 60
Discharge: HOME | End: 2020-08-28
Payer: COMMERCIAL

## 2020-08-28 PROCEDURE — 93880 EXTRACRANIAL BILAT STUDY: CPT

## 2020-08-28 NOTE — TELEPHONE ENCOUNTER
Created telephone encounter. Spoke with Faheem Finley relayed message per [facility] regarding carotid results. Pt verbalized understanding. Pt states he cut back on lisinopril to 1/2 tab daily /80 regularly around there. He has no symptoms. Pt states he now weighs 230. Pt says he will check bp daily for the next couple weeks since he changed it and will call if he has an issue.

## 2020-08-28 NOTE — TELEPHONE ENCOUNTER
----- Message from Austen Vazquez MD sent at 8/28/2020  8:21 AM EDT -----  The test is normal. Please call the patient and inform them of the normal result.

## 2020-09-02 LAB
CATARACTS: POSITIVE
DIABETIC RETINOPATHY: NEGATIVE
GLAUCOMA: NEGATIVE
INTRAOCULAR PRESSURE EYE: NORMAL
VISUAL ACUITY DISTANCE LEFT EYE: NORMAL
VISUAL ACUITY DISTANCE RIGHT EYE: NORMAL

## 2020-09-03 RX ORDER — DIAZEPAM 10 MG/1
TABLET ORAL
Qty: 40 TABLET | Refills: 0 | Status: SHIPPED | OUTPATIENT
Start: 2020-09-03 | End: 2020-10-13

## 2020-09-03 NOTE — TELEPHONE ENCOUNTER
.  Last office visit 8/18/2020     Last written 7- 40 with 0      Next office visit scheduled 10/6/2020    Requested Prescriptions     Pending Prescriptions Disp Refills    diazePAM (VALIUM) 10 MG tablet [Pharmacy Med Name: DIAZEPAM 10 MG TABLET] 40 tablet 0     Sig: TAKE 1.5 TABLETS BY MOUTH DAILY AS NEEDED *TO LAST 30 DAYS*

## 2020-09-04 ENCOUNTER — TELEPHONE (OUTPATIENT)
Dept: FAMILY MEDICINE CLINIC | Age: 60
End: 2020-09-04

## 2020-09-04 NOTE — TELEPHONE ENCOUNTER
----- Message from Lennard Najjar sent at 9/4/2020  2:15 PM EDT -----  Subject: Message to Provider    QUESTIONS  Information for Provider? patient is requesting some imaging for his chest   (lungs) he is concerned due to his mother having passed away due to lung   cancer. Says his breathing has felt irregular especially when talking. please call ASAP   ---------------------------------------------------------------------------  --------------  CALL BACK INFO  What is the best way for the office to contact you? OK to leave message on   voicemail  Preferred Call Back Phone Number? [phone]  ---------------------------------------------------------------------------  --------------  SCRIPT ANSWERS  Relationship to Patient?  Self

## 2020-09-04 NOTE — TELEPHONE ENCOUNTER
Spoke to pt and he states he has seen 4 docs in the last month or so and all docs including Dr. Sherryle Hammock told him his lungs were clear. He states that his breathing is fine but for several weeks he has had an irritation in his throat. He described it as a feeling before you have a cough or a sore throat. He doesn't think its allergies. He can take a full deep breath in and out no problem but gets the irritation feeling.

## 2020-09-10 NOTE — PROGRESS NOTES
[facility]) Dermatology  Erika Nicholson MD  [address] [facility]  1960    62 y.o. male     Date of Visit: 9/14/2020    Last Visit: 1yr    Chief Complaint: Skin check    History of Present Illness:  1. Here for upper body skin check. History of actinic keratoses s/p cryotherapy.   -Has not been spending as much time in sun because got laid off 6 weeks ago and has been having a lot of back pain . Wears SPF 30 sunscreen on face daily.   -Reports rough lesions on scalp and face     2. Complains of a pruritic lesion of groin     Derm History:   -Epidermoid cyst, midline mid back    Review of Systems:  Constitutional: Reports general sense of well-being. Skin: No interval severe sunburns. Allergies: Reviewed and updated. Past Medical History, Surgical History, Medications and Allergies reviewed. Past Medical History:   Diagnosis Date    Chronic back pain     Chronic pain     Diverticulitis     DVT (deep venous thrombosis) (HCC)     Hyperlipidemia     Irritable bowel syndrome     Pain medication agreement signed     discontued care due eye surgery/imbilical hernia surgery -    Type II or unspecified type diabetes mellitus without mention of complication, not stated as uncontrolled     Umbilical hernia     having surgery 11/23     Past Surgical History:   Procedure Laterality Date    ABDOMEN SURGERY      ABDOMINAL ADHESION SURGERY      CATARACT REMOVAL WITH IMPLANT Left 11/13/15    COLON SURGERY      times 3    COLONOSCOPY      HERNIA REPAIR      OTHER SURGICAL HISTORY  9-26-12    EXCISION OF RIGHT THYROID NODULE          THROAT SURGERY Bilateral october 2013    TONSILLECTOMY      UPPER GASTROINTESTINAL ENDOSCOPY  02/10/2017    gastritis/duodenitis       Allergies   Allergen Reactions    Codeine Hives and Nausea Only     25 years ago-unsure if allergy stated    Dilaudid [Hydromorphone Hcl]     Fentanyl      Patch caused skin irritation.  IV does not bother pt has had fentanyl capsule       aspirin 81 MG chewable tablet Take 81 mg by mouth daily      Blood Glucose Monitoring Suppl (ACCU-CHEK AMA PLUS) W/DEVICE KIT USE AS DIRECTED 1 kit 0     Social History:  previously    Physical Examination     The following were examined and determined to be normal: Psych/Neuro, Conjunctivae/eyelids, Gums/teeth/lips, Neck, Breast/axilla/chest, Abdomen, Back, RUE, LUE, RLE, LLE and Nails/digits. The following were examined and determined to be abnormal: Scalp/hair, Head/face and Genitalia/groin/buttocks. -General: Well-appearing, NAD  1. Vertex scalp 4, R 1 and L 2 temples, R nasal sidewall 1 - ill-defined irregularly-shaped roughly-scaling thin pink macule(s)/papule(s)  2. R suprapubic 1 - pedunculated skin-colored soft papule      Assessment and Plan     1. Actinic keratosis(es)  -Edu re: relationship with chronic cumulative sun exposure, low premalignant potential.   -8 lesion(s) treated w/ liquid nitrogen x 2 cycles - Vertex scalp 4, R 1 and L 2 temples, R nasal sidewall 1. Edu re: risk of blister formation, discomfort, scar, hypopigmentation. Discussed wound care. -Reviewed sun protective behavior -- sun avoidance during the peak hours of the day, sun-protective clothing (including hat and sunglasses), sunscreen use (water resistant, broad spectrum, SPF at least 30, need for reapplication every 2 to 3 hours). -Return for full skin exam in 1 year (sooner if indicated)      2. Skin tag(s), irritated  -1 lesion(s) treated w/ liquid nitrogen x 2 cycles - R suprapubic . Edu re: risk of blister formation, discomfort, scar, hypopigmentation. Discussed wound care.

## 2020-09-14 ENCOUNTER — OFFICE VISIT (OUTPATIENT)
Dept: DERMATOLOGY | Age: 60
End: 2020-09-14
Payer: COMMERCIAL

## 2020-09-14 ENCOUNTER — TELEPHONE (OUTPATIENT)
Dept: FAMILY MEDICINE CLINIC | Age: 60
End: 2020-09-14

## 2020-09-14 VITALS — TEMPERATURE: 97.7 F

## 2020-09-14 PROCEDURE — 17000 DESTRUCT PREMALG LESION: CPT | Performed by: DERMATOLOGY

## 2020-09-14 PROCEDURE — 99213 OFFICE O/P EST LOW 20 MIN: CPT | Performed by: DERMATOLOGY

## 2020-09-14 PROCEDURE — 11200 RMVL SKIN TAGS UP TO&INC 15: CPT | Performed by: DERMATOLOGY

## 2020-09-14 PROCEDURE — 17003 DESTRUCT PREMALG LES 2-14: CPT | Performed by: DERMATOLOGY

## 2020-09-16 ENCOUNTER — HOSPITAL ENCOUNTER (OUTPATIENT)
Age: 60
Discharge: HOME | End: 2020-09-16
Payer: COMMERCIAL

## 2020-09-16 ENCOUNTER — OFFICE VISIT (OUTPATIENT)
Dept: ENDOCRINOLOGY | Age: 60
End: 2020-09-16
Payer: COMMERCIAL

## 2020-09-16 VITALS
WEIGHT: 235.2 LBS | DIASTOLIC BLOOD PRESSURE: 76 MMHG | SYSTOLIC BLOOD PRESSURE: 117 MMHG | HEART RATE: 83 BPM | BODY MASS INDEX: 31.86 KG/M2 | OXYGEN SATURATION: 98 % | HEIGHT: 72 IN

## 2020-09-16 LAB
T4 FREE: 1.2 NG/DL (ref 0.9–1.8)
TSH SERPL DL<=0.05 MIU/L-ACNC: 0.87 UIU/ML (ref 0.27–4.2)
VITAMIN D 25-HYDROXY: 31.1 NG/ML

## 2020-09-16 PROCEDURE — 36415 COLL VENOUS BLD VENIPUNCTURE: CPT

## 2020-09-16 PROCEDURE — 86341 ISLET CELL ANTIBODY: CPT

## 2020-09-16 PROCEDURE — 99203 OFFICE O/P NEW LOW 30 MIN: CPT | Performed by: NURSE PRACTITIONER

## 2020-09-16 PROCEDURE — 86337 INSULIN ANTIBODIES: CPT

## 2020-09-16 PROCEDURE — 3052F HG A1C>EQUAL 8.0%<EQUAL 9.0%: CPT | Performed by: NURSE PRACTITIONER

## 2020-09-16 PROCEDURE — 84681 ASSAY OF C-PEPTIDE: CPT

## 2020-09-16 PROCEDURE — 84439 ASSAY OF FREE THYROXINE: CPT

## 2020-09-16 PROCEDURE — 84443 ASSAY THYROID STIM HORMONE: CPT

## 2020-09-16 PROCEDURE — 82306 VITAMIN D 25 HYDROXY: CPT

## 2020-09-16 RX ORDER — INSULIN GLARGINE 300 U/ML
40 INJECTION, SOLUTION SUBCUTANEOUS NIGHTLY
Qty: 3 PEN | Refills: 1 | Status: SHIPPED | OUTPATIENT
Start: 2020-09-16 | End: 2020-10-21 | Stop reason: SINTOL

## 2020-09-16 ASSESSMENT — ENCOUNTER SYMPTOMS
CONSTIPATION: 0
SHORTNESS OF BREATH: 0
NAUSEA: 0
COLOR CHANGE: 0
EYE PAIN: 0
DIARRHEA: 0

## 2020-09-16 NOTE — PROGRESS NOTES
Endocrinology  Sanchez Vasques, CNP, [address]  [place], [address]  Phone [phone]  Fax [phone]    Edwin Hill is a 56 y.o. male who is a new patient following up for management of Diabetes Mellitus Type 2. PCP: eBst Banerjee DO    Last A1C:   Lab Results   Component Value Date    LABA1C 8.0 2020    LABA1C 8.6 2019    LABA1C 7.6 2019     Last BP Readings:  BP Readings from Last 3 Encounters:   20 117/76   20 100/60   20 (!) 106/56     Last LDL:   Lab Results   Component Value Date    LDLCALC 56 2020     Aspirin Use: 81 mg    Tobacco/Alcohol History:    Smoking status: Former Smoker     Packs/day: 1.00     Years: 3.00     Pack years: 3.00     Last attempt to quit: 1980     Years since quittin.7    Smokeless tobacco: Never Used    Alcohol use: No     Alcohol/week: 0.0 standard drinks     Diabetes:   Edwin Hill  was diagnosed with diabetes type 2 in :    On insulin:    Patient reports that disease course has been variable and is currently not controlled   Current microvascular complications include none   Current Macrovascular complications there is no history of  CVD, CAD, PVD   Patient reports compliance for about 100 % of the time and adheres to medication, but usually not to diet and exercise instructions.  There have been no recent hospital or ED admissions for hypoglycemia, hyperglycemia or DKA.   [facility] Other ED visit include for : none    PMH includes  Past Medical History:   Diagnosis Date    Chronic back pain     Chronic pain     Diverticulitis     DVT (deep venous thrombosis) (HCC)     Hyperlipidemia     Irritable bowel syndrome     Pain medication agreement signed     discontued care due eye surgery/imbilical hernia surgery -    Type II or unspecified type diabetes mellitus without mention of complication, not stated as uncontrolled     Umbilical hernia     having surgery  light headedness. Occasional dependent edema. Tries to follow a salt restricted diet. Lab Results   Component Value Date     (L) 08/21/2020    K 4.4 08/21/2020    CL 96 (L) 08/21/2020    CO2 20 (L) 08/21/2020    BUN 35 (H) 08/21/2020    CREATININE 1.4 (H) 08/21/2020    GLUCOSE 90 08/21/2020    CALCIUM 9.3 08/21/2020    PROT 7.2 08/21/2020    LABALBU 4.5 08/21/2020    BILITOT 0.5 08/21/2020    ALKPHOS 60 08/21/2020    AST 24 08/21/2020    ALT 31 08/21/2020    LABGLOM 51 (A) 08/21/2020    GFRAA >60 08/21/2020    AGRATIO 1.7 08/21/2020    GLOB 2.7 08/21/2020     The ASCVD Risk score (David Darden, et al., 2013) failed to calculate for the following reasons: The valid total cholesterol range is 130 to 320 mg/dL    Family History   Problem Relation Age of Onset    Cancer Mother     Heart Disease Father     Cancer Other     Heart Disease Other      Review of Systems   Constitutional: Negative for activity change, appetite change, diaphoresis, fever and unexpected weight change. HENT: Negative for dental problem. Eyes: Negative for pain and visual disturbance. Respiratory: Negative for shortness of breath. Cardiovascular: Negative for chest pain, palpitations and leg swelling. Gastrointestinal: Negative for constipation, diarrhea and nausea. Endocrine: Negative for cold intolerance, heat intolerance, polydipsia, polyphagia and polyuria. Genitourinary: Negative for frequency and urgency. Musculoskeletal: Negative for arthralgias, joint swelling and myalgias. Skin: Negative for color change and pallor. Neurological: Negative for weakness, numbness and headaches. Psychiatric/Behavioral: Negative for dysphoric mood and sleep disturbance. The patient is not nervous/anxious.       Vitals:    09/16/20 0859   BP: 117/76   Site: Right Upper Arm   Position: Sitting   Cuff Size: Medium Adult   Pulse: 83   SpO2: 98%   Weight: 235 lb 3.2 oz (106.7 kg)   Height: 6' (1.829 m)     Physical Exam  Vitals

## 2020-09-18 LAB
C-PEPTIDE: 0.6 NG/ML (ref 1.1–4.4)
GLUTAMIC ACID DECARB AB: <5 IU/ML (ref 0–5)

## 2020-09-21 LAB — INSULIN A: <0.4 U/ML (ref 0–0.4)

## 2020-09-22 ENCOUNTER — TELEPHONE (OUTPATIENT)
Dept: ENDOCRINOLOGY | Age: 60
End: 2020-09-22

## 2020-09-22 NOTE — TELEPHONE ENCOUNTER
Patient called and said his sugars keep climbing up on the toujeo. He wants to know what else to do.

## 2020-09-22 NOTE — TELEPHONE ENCOUNTER
Pt doesn't believe it's drainage. Takes something for sinus. It is in his lungs/esophagus. He wants a referral to doctor for bronchial/lungs. Heart doc told him to lay off of allergy meds because they're causing heart issues. What is bothering him is a tickle in esophagus where it meets lungs. Also effects his talking when he talks for a while. Is something he's never had before. Mom  of lung cancer, so he wants to be proactive on this. [phone]- please call when referral is placed. Leave message if he doesn't answer.

## 2020-09-23 ENCOUNTER — HOSPITAL ENCOUNTER (OUTPATIENT)
Dept: GENERAL RADIOLOGY | Age: 60
Discharge: HOME | End: 2020-09-23
Payer: COMMERCIAL

## 2020-09-23 ENCOUNTER — HOSPITAL ENCOUNTER (OUTPATIENT)
Age: 60
Discharge: HOME | End: 2020-09-23
Payer: COMMERCIAL

## 2020-09-23 PROCEDURE — 71046 X-RAY EXAM CHEST 2 VIEWS: CPT

## 2020-09-23 NOTE — TELEPHONE ENCOUNTER
Called patient  Increase Toujeo to 45 units QHS  Humalog: use AC per SSI  No change in other meds  Follow up in 3 weeks is scheduled

## 2020-09-26 ENCOUNTER — TELEPHONE (OUTPATIENT)
Dept: ENDOCRINOLOGY | Age: 60
End: 2020-09-26

## 2020-09-26 NOTE — TELEPHONE ENCOUNTER
Pt called for leg pains and diarrhea   Concerned about Toujeo side effects   I don't think Toujeo should do these side effects since he was tolerating Lantus well   He can do Lantus instead of Toujeo to see side effects go away. He has lantus from previous Rx.

## 2020-10-06 ENCOUNTER — VIRTUAL VISIT (OUTPATIENT)
Dept: FAMILY MEDICINE CLINIC | Age: 60
End: 2020-10-06
Payer: COMMERCIAL

## 2020-10-06 PROCEDURE — 3052F HG A1C>EQUAL 8.0%<EQUAL 9.0%: CPT | Performed by: FAMILY MEDICINE

## 2020-10-06 PROCEDURE — 99213 OFFICE O/P EST LOW 20 MIN: CPT | Performed by: FAMILY MEDICINE

## 2020-10-06 ASSESSMENT — ENCOUNTER SYMPTOMS
BLOOD IN STOOL: 0
ABDOMINAL PAIN: 0
COUGH: 0
SHORTNESS OF BREATH: 0

## 2020-10-06 NOTE — PATIENT INSTRUCTIONS
Adjustment insomnia  Continue medications and try to reduce when possible. Keep as active as possible. Essential hypertension  Continue medications and no added salt diet-continue weight loss and staying active.   Type 2 diabetes mellitus without complication, with long-term current use of insulin ([facility]  Continue medications and visits with the endocrinologist.

## 2020-10-06 NOTE — PROGRESS NOTES
Subjective:      Patient ID: Campbell Boyle is a 56 y.o. male. HPI  This is a video visit with Kamryn Contreras due to the ongoing virus in the community. He is at home-we have permission for the call-she is alone on the call and I am in my office at [facility]. This is basically a 3-month checkup for his Valium that he takes at bedtime-doing pretty well with the medication- does not quite work like it used to but he has been on it for a while. Diabetes he does see a endocrinologist for his sugar and was recently put on a once a day insulin and seems to be doing better. Hypertension-blood pressures are doing very well. He is also seeing a urologist for prostate/bladder issues. He denies any other issues to discuss. Prior to Visit Medications :  Medication Insulin Glargine, 2 Unit Dial, (TOUJEO MAX SOLOSTAR) 300 UNIT/ML SOPN, Sig Inject 40 Units into the skin nightly  Patient taking differently: Inject 45 Units into the skin nightly , Taking? Yes, Authorizing Provider AMADA Gong - CNP    Medication glipiZIDE (GLUCOTROL) 5 MG tablet, Sig 1.5  bid  Patient taking differently: daily , Taking? Yes, Authorizing Provider Miguel Snow, DO    Medication metFORMIN (GLUCOPHAGE) 1000 MG tablet, Sig Take 1 tablet by mouth twice a day, Taking? Yes, Authorizing Provider Miguel Snow, DO    Medication pantoprazole (PROTONIX) 40 MG tablet, Sig TAKE 1 TABLET BY MOUTH DAILY, Taking? Yes, Authorizing Provider Miguel Snow, DO    Medication insulin aspart (NOVOLOG FLEXPEN) 100 UNIT/ML injection pen, Sig Inject up to 15 units subcutaneously twice a day per sliding scale, Taking? Yes, Authorizing Provider Miguel Snow, DO    Medication lisinopril (PRINIVIL;ZESTRIL) 20 MG tablet, Sig Take 1 tablet by mouth every day, Taking? Yes, Authorizing Provider Miguel Snow, DO    Medication blood glucose test strips (ACCU-CHEK COMFORT CURVE) strip, Sig 1 each by In Vitro route daily As needed. , Taking?  Yes, Authorizing Provider Miguel Snow, DO    Medication Insulin Pen Needle (B-D UF III MINI PEN NEEDLES) 31G X 5 MM MISC, Sig Use up to 3 times a day, Taking? Yes, Authorizing Provider Miguel Snow, DO    Medication atorvastatin (LIPITOR) 40 MG tablet, Sig Take 1 tablet by mouth every day, Taking? Yes, Authorizing Provider Miguel Snow, DO    Medication ACCU-CHEK MULTICLIX LANCETS MISC, Sig 1 each by Does not apply route 3 times daily, Taking? Yes, Authorizing Provider Miguel Snow, DO    Medication Blood Glucose Monitoring Suppl (ACCU-CHEK COMPACT CARE KIT) KIT, Sig 1 each by Does not apply route daily, Taking? Yes, Authorizing Provider Miguel Snow, DO    Medication ACCU-CHEK FASTCLIX LANCETS MISC, Sig USE 3x TIMES DAILY, Taking? Yes, Authorizing Provider Andres Snow, DO    Medication ONETOUCH DELICA LANCETS FINE MISC, Sig Use 3 times a day, Taking? Yes, Authorizing Provider Andres Snow, DO    Medication glucose monitoring kit (FREESTYLE) monitoring kit, Sig 1 kit by Does not apply route daily as needed (once daily) One touch ultra 2 Dx E11.9, Taking? Yes, Authorizing Provider Miguel Snow, DO    Medication Probiotic Product (PROBIOTIC-10 ULTIMATE PO), Sig Take by mouth, Taking? Yes, Authorizing Provider Historical Provider, MD    Medication dicyclomine (BENTYL) 10 MG capsule, Sig , Taking? Yes, Authorizing Provider Historical Provider, MD    Medication aspirin 81 MG chewable tablet, Sig Take 81 mg by mouth daily, Taking? Yes, Authorizing Provider Historical Provider, MD    Medication Blood Glucose Monitoring Suppl (ACCU-CHEK AMA PLUS) W/DEVICE KIT, Sig USE AS DIRECTED, Taking?  Yes, Authorizing Provider Miguel Snow, DO    Medication fluticasone (FLONASE) 50 MCG/ACT nasal spray, Sig SPRAY 2 SPRAYS INTO EACH NOSTRIL EVERY DAY  Patient not taking: Reported on 10/6/2020, Taking? , Authorizing Provider [place],       Past Medical History:  No date: Chronic back pain  No date: Chronic pain  No date: Diverticulitis  No date: DVT (deep venous thrombosis) ([facility])  No date: Hyperlipidemia  No date: Irritable bowel syndrome  No date: Pain medication agreement signed      Comment:  discontued care due eye surgery/imbilical hernia surgery               -  No date: Type II or unspecified type diabetes mellitus without   mention of complication, not stated as uncontrolled  No date: Umbilical hernia      Comment:  having surgery 11/23        Review of Systems    Review of Systems   Constitutional: Negative for fever. Has lost 36 pounds   Respiratory: Negative for cough and shortness of breath. Cardiovascular: Negative for chest pain. Gastrointestinal: Negative for abdominal pain and blood in stool. Genitourinary: Positive for frequency. Musculoskeletal: Positive for arthralgias. Neurological: Negative for tremors and headaches. Psychiatric/Behavioral: Positive for sleep disturbance. The patient is nervous/anxious. Objective:   Physical Exam      Physical Exam  Constitutional:       Appearance: Normal appearance. He is obese. Neurological:      Mental Status: He is alert and oriented to person, place, and time. Psychiatric:         Mood and Affect: Mood normal.         Behavior: Behavior normal.         Thought Content: Thought content normal.         Judgment: Judgment normal.         Assessment:       Diagnosis Orders   1. Adjustment insomnia     2. Essential hypertension     3. Type 2 diabetes mellitus without complication, with long-term current use of insulin ([facility]           Plan:      Sandie Peterson was seen today for 3 month follow-up. Diagnoses and all orders for this visit:    Adjustment insomnia  Continue medications and try to reduce when possible. Keep as active as possible. Essential hypertension  Continue medications and no added salt diet-continue weight loss and staying active.   Type 2 diabetes mellitus without complication, with long-term current use of insulin ([facility])  Continue medications and visits with the endocrinologist.    See me 3 months in the office. This is been a video visit of 12 to 14 minutes.      Miguel Snow, DO

## 2020-10-12 ENCOUNTER — TELEPHONE (OUTPATIENT)
Dept: ENDOCRINOLOGY | Age: 60
End: 2020-10-12

## 2020-10-12 NOTE — TELEPHONE ENCOUNTER
Refill Request - Controlled Substance    Last Seen: 10/6/2020       Last Written: 9/03/2020    Last UDS: No UDS on File at this time.      Last Oarrs: 12/19/2018    Med Agreement Signed On: 1/04/2020    Next Appointment: 1/11/2021    Appointment scheduled    Requested Prescriptions     Pending Prescriptions Disp Refills    diazePAM (VALIUM) 10 MG tablet [Pharmacy Med Name: DIAZEPAM 10 MG TABLET] 40 tablet 0     Sig: TAKE 1.5 TABLETS BY MOUTH DAILY AS NEEDED *TO LAST 30 DAYS*

## 2020-10-12 NOTE — TELEPHONE ENCOUNTER
Pt called and has had a lot of stomach pain and issues since day one of the toujeo and would like to talk to Rahul about this.  He keeps feeling worse

## 2020-10-13 ENCOUNTER — TELEPHONE (OUTPATIENT)
Dept: ENDOCRINOLOGY | Age: 60
End: 2020-10-13

## 2020-10-13 ENCOUNTER — HOSPITAL ENCOUNTER (OUTPATIENT)
Age: 60
Discharge: HOME | End: 2020-10-13
Payer: COMMERCIAL

## 2020-10-13 LAB
A/G RATIO: 1.5 (ref 1.1–2.2)
ALBUMIN SERPL-MCNC: 4.2 G/DL (ref 3.4–5)
ALP BLD-CCNC: 56 U/L (ref 40–129)
ALT SERPL-CCNC: 24 U/L (ref 10–40)
ANION GAP SERPL CALCULATED.3IONS-SCNC: 13 MMOL/L (ref 3–16)
AST SERPL-CCNC: 23 U/L (ref 15–37)
BILIRUB SERPL-MCNC: 0.4 MG/DL (ref 0–1)
BUN BLDV-MCNC: 15 MG/DL (ref 7–20)
CALCIUM SERPL-MCNC: 9.6 MG/DL (ref 8.3–10.6)
CHLORIDE BLD-SCNC: 103 MMOL/L (ref 99–110)
CO2: 23 MMOL/L (ref 21–32)
CREAT SERPL-MCNC: 1.2 MG/DL (ref 0.8–1.3)
GFR AFRICAN AMERICAN: >60
GFR NON-AFRICAN AMERICAN: >60
GLOBULIN: 2.8 G/DL
GLUCOSE BLD-MCNC: 44 MG/DL (ref 70–99)
POTASSIUM SERPL-SCNC: 4 MMOL/L (ref 3.5–5.1)
SODIUM BLD-SCNC: 139 MMOL/L (ref 136–145)
TOTAL PROTEIN: 7 G/DL (ref 6.4–8.2)

## 2020-10-13 PROCEDURE — 80053 COMPREHEN METABOLIC PANEL: CPT

## 2020-10-13 PROCEDURE — 36415 COLL VENOUS BLD VENIPUNCTURE: CPT

## 2020-10-13 RX ORDER — DIAZEPAM 10 MG/1
TABLET ORAL
Qty: 40 TABLET | Refills: 0 | Status: SHIPPED | OUTPATIENT
Start: 2020-10-13 | End: 2020-11-17

## 2020-10-13 NOTE — TELEPHONE ENCOUNTER
Called patient. Was dehydrated when last seen, will repeat metabolic panel.   Has stayed on toujeo, based on his discussion with GI, discussed going back to lantus until his visit with me next week  Reviewed his current labs

## 2020-10-14 NOTE — PROGRESS NOTES
Called patient to review critical lab. BG = 44  patient doing well. current blood sugar is at 130 he reported being low at the time of the lab draw.    Renal function appears improved otherwise   Patient follow up next week

## 2020-10-21 ENCOUNTER — OFFICE VISIT (OUTPATIENT)
Dept: ENDOCRINOLOGY | Age: 60
End: 2020-10-21
Payer: COMMERCIAL

## 2020-10-21 VITALS
OXYGEN SATURATION: 96 % | HEART RATE: 87 BPM | WEIGHT: 227 LBS | TEMPERATURE: 98.3 F | DIASTOLIC BLOOD PRESSURE: 64 MMHG | BODY MASS INDEX: 30.75 KG/M2 | HEIGHT: 72 IN | SYSTOLIC BLOOD PRESSURE: 112 MMHG | RESPIRATION RATE: 14 BRPM

## 2020-10-21 LAB — HBA1C MFR BLD: 6.2 %

## 2020-10-21 PROCEDURE — 83036 HEMOGLOBIN GLYCOSYLATED A1C: CPT | Performed by: NURSE PRACTITIONER

## 2020-10-21 PROCEDURE — 99214 OFFICE O/P EST MOD 30 MIN: CPT | Performed by: NURSE PRACTITIONER

## 2020-10-21 RX ORDER — INSULIN GLARGINE 100 [IU]/ML
40 INJECTION, SOLUTION SUBCUTANEOUS 2 TIMES DAILY
Qty: 5 PEN | Refills: 3 | Status: SHIPPED | OUTPATIENT
Start: 2020-10-21 | End: 2021-02-06 | Stop reason: SDUPTHER

## 2020-10-21 ASSESSMENT — ENCOUNTER SYMPTOMS
DIARRHEA: 0
SHORTNESS OF BREATH: 0
COLOR CHANGE: 0
CONSTIPATION: 0
NAUSEA: 0
EYE PAIN: 0

## 2020-10-21 NOTE — PATIENT INSTRUCTIONS
A1c is 6.2  At goal at this time    Fasting blood sugar goal is 90 -120    Lantus  Daytime dose: 36 units  Night time dose: 30 units  Decrease by 1 unit for any ONE given day that fasting blood sugars are < 90  Enter in log book as discussed and bring it to the next visit      Humalog  Sliding scale:    The sliding scale covers your blood sugar numbers before you eat  151- 200 Add  2 units  201- 250 Add 4 units  251- 300 Add 6 units   301- 350 Add 8 units  351- 400 Add 10 units  If > 400 : take fixed dose  +10 units, recheck blood sugar in 4 hours and dose according to the sliding scale only    Diet: continue on current diet

## 2020-10-21 NOTE — PROGRESS NOTES
Endocrinology  [facility], [facility], [address]  [address] [facility], [address] [place] [address] Ave  Phone [phone]  Fax [phone]    Myrtle Morgan is a 56 y.o. male who is a new patient following up for management of Diabetes Mellitus Type 2. PCP: Jacky Chen DO    Last A1C:   Lab Results   Component Value Date    LABA1C 6.2 10/21/2020    LABA1C 8.0 2020    LABA1C 8.6 2019     Last BP Readings:  BP Readings from Last 3 Encounters:   10/21/20 112/64   20 117/76   20 100/60     Last LDL:   Lab Results   Component Value Date    LDLCALC 56 2020     Aspirin Use: 81 mg    Tobacco/Alcohol History:    Smoking status: Former Smoker     Packs/day: 1.00     Years: 3.00     Pack years: 3.00     Last attempt to quit: 1980     Years since quittin.7    Smokeless tobacco: Never Used    Alcohol use: No     Alcohol/week: 0.0 standard drinks     Diabetes:   Myrtle Morgan  was diagnosed with diabetes type 2 in :    On insulin:    Patient reports that disease course has been variable and is currently not controlled   Current microvascular complications include none   Current Macrovascular complications there is no history of  CVD, CAD, PVD   Patient reports compliance for about 100 % of the time and adheres to medication, but usually not to diet and exercise instructions.  There have been no recent hospital or ED admissions for hypoglycemia, hyperglycemia or DKA.   Eldon Nuris Other ED visit include for : none    PMH includes  Past Medical History:   Diagnosis Date    Chronic back pain     Chronic pain     Diverticulitis     DVT (deep venous thrombosis) (HCC)     Hyperlipidemia     Irritable bowel syndrome     Pain medication agreement signed     discontued care due eye surgery/imbilical hernia surgery -    Type II or unspecified type diabetes mellitus without mention of complication, not stated as uncontrolled     Umbilical hernia     having surgery  Blood glucose trends:  Patient brought log glucometer for download  Readings per day  2 per patient report  BG Range 50 -300--> 61- 225  Hypoglycemia awareness and symptoms: dizzy,   Has not done CGM    Current Medication regimen:   Biguanides: 1000 mg BID  STRICKLAND:  glipizide 7.5 mg BID  GLP1: no , h/o pancreatitis  SGLT2: trouble urinating with BPH,   Insulin:   Lantus: 36 units BID--> 21 BID--> 36 BID  Novolog: on SSI ( not available to patient) AC TID-->> 6 units fixed dose 2: 50 > 175    Other meds tried in past: januvia  GFR > 60    Current Dietary regimen:   BF: wheat toast with eggs  Lunch: salad with tuna/chicken  Dinner: meat/green veggies/   Snacks yoghurt. blueberries  Beverages: water   Average carbs per day: < 100 grams of carbs    Microvascular Complications:  · Neuropathy: denies concerns  · Retinopathy: no concerns with vision, field of vision  · Nephropathy: no microalbuminuria no recent MACR    Diabetic Health Maintenance   · Last Eye Exam: > 6 months  · Last Foot exam: none  · Has patient seen a dietitian? Yes  · Current Exercise: No structured exercise  · On ACEI or ARB: lisinopril 20 mg  · On statin: lipitor 40 mg    Hyperlipidemia: Current complaints include occasional myalgias but otherwise tolerates well. Lab Results   Component Value Date    CHOL 123 08/21/2020    CHOL 116 09/09/2019    CHOL 133 12/20/2017     Lab Results   Component Value Date    TRIG 103 08/21/2020    TRIG 74 09/09/2019    TRIG 64 12/20/2017     Lab Results   Component Value Date    HDL 46 08/21/2020    HDL 51 09/09/2019    HDL 52 12/20/2017     Lab Results   Component Value Date    LDLCALC 56 08/21/2020    LDLCALC 50 09/09/2019    LDLCALC 68 12/20/2017     No results found for: LDLDIRECT  No results found for: CHOLHDLRATIO    Vitamin D deficiency: Currently is on no supplementation. Current complaints include fatigue on daily basis.    Last vitamin Dlevel is:  Lab Results   Component Value Date    VITD25 31.1 09/16/2020 Hypertension  Controlled , denies symptoms of dizziness, light headedness. Occasional dependent edema. Tries to follow a salt restricted diet. Lab Results   Component Value Date     10/13/2020    K 4.0 10/13/2020     10/13/2020    CO2 23 10/13/2020    BUN 15 10/13/2020    CREATININE 1.2 10/13/2020    GLUCOSE 44 (LL) 10/13/2020    CALCIUM 9.6 10/13/2020    PROT 7.0 10/13/2020    LABALBU 4.2 10/13/2020    BILITOT 0.4 10/13/2020    ALKPHOS 56 10/13/2020    AST 23 10/13/2020    ALT 24 10/13/2020    LABGLOM >60 10/13/2020    GFRAA >60 10/13/2020    AGRATIO 1.5 10/13/2020    GLOB 2.8 10/13/2020     The ASCVD Risk score (Kerrie Villatoro, et al., 2013) failed to calculate for the following reasons: The valid total cholesterol range is 130 to 320 mg/dL    Family History   Problem Relation Age of Onset    Cancer Mother     Heart Disease Father     Cancer Other     Heart Disease Other      Review of Systems   Constitutional: Negative for activity change, appetite change, diaphoresis, fever and unexpected weight change. HENT: Negative for dental problem. Eyes: Negative for pain and visual disturbance. Respiratory: Negative for shortness of breath. Cardiovascular: Negative for chest pain, palpitations and leg swelling. Gastrointestinal: Negative for constipation, diarrhea and nausea. Endocrine: Negative for cold intolerance, heat intolerance, polydipsia, polyphagia and polyuria. Genitourinary: Negative for frequency and urgency. Musculoskeletal: Negative for arthralgias, joint swelling and myalgias. Skin: Negative for color change and pallor. Neurological: Negative for weakness, numbness and headaches. Psychiatric/Behavioral: Negative for dysphoric mood and sleep disturbance. The patient is not nervous/anxious.       Vitals:    10/21/20 0937   BP: 112/64   Pulse: 87   Resp: 14   Temp: 98.3 °F (36.8 °C)   TempSrc: Temporal   SpO2: 96%   Weight: 227 lb (103 kg)   Height: 6' (1.829 m) Physical Exam  Vitals signs reviewed. Constitutional:       Appearance: He is well-developed. Eyes:      Pupils: Pupils are equal, round, and reactive to light. Neck:      Musculoskeletal: Normal range of motion. Thyroid: No thyromegaly. Cardiovascular:      Rate and Rhythm: Normal rate and regular rhythm. Heart sounds: Normal heart sounds. Pulmonary:      Effort: Pulmonary effort is normal.      Breath sounds: Normal breath sounds. Abdominal:      General: There is no distension. Musculoskeletal: Normal range of motion. Skin:     General: Skin is warm and dry. Comments: No skin changes or evidence of trauma. Neurological:      Mental Status: He is alert and oriented to person, place, and time. Sensory: No sensory deficit. Psychiatric:         Behavior: Behavior normal.         Thought Content: Thought content normal.       Skeletal foot exam:   Toenails are normal.   Pulses are +2 DP bilaterally. Sensory: 10 g monofilament is 10/10 on the right and 10/10 on the left,   128 Hz vibration sense is present bilaterally. Everette Sacks is a 56 y.o. male with uncontrolled Diabetes Mellitus type 2  associated with HLD and HTN    Plan  Diabetes Mellitus Type 2           A1c is 6.2  At goal at this time  Did not titrate Toujeo as rec, switched back to Lantus @ BID dosing  Glipizide: stop glipizide  If lows continue at night may stop night time dose    Fasting blood sugar goal is 90 -120    Lantus  Daytime dose: 36 units  Night time dose: 30 units  Decrease by 1 unit for any ONE given day that fasting blood sugars are < 90  Enter in log book as discussed and bring it to the next visit      Humalog  Sliding scale:    The sliding scale covers your blood sugar numbers before you eat  151- 200 Add  2 units  201- 250 Add 4 units  251- 300 Add 6 units   301- 350 Add 8 units  351- 400 Add 10 units  If > 400 : take fixed dose  +10 units, recheck blood sugar in 4 hours and minutes spent directly counseling patient about topics listed above (such as lifestyle modifications, preventative screenings and/or disease related processes. Return in about 3 months (around 1/21/2021).

## 2020-10-22 ENCOUNTER — HOSPITAL ENCOUNTER (OUTPATIENT)
Dept: GENERAL RADIOLOGY | Age: 60
Discharge: HOME | End: 2020-10-22
Payer: COMMERCIAL

## 2020-10-22 ENCOUNTER — HOSPITAL ENCOUNTER (OUTPATIENT)
Age: 60
Discharge: HOME | End: 2020-10-22
Payer: COMMERCIAL

## 2020-10-22 PROCEDURE — 74018 RADEX ABDOMEN 1 VIEW: CPT

## 2020-11-03 ENCOUNTER — TELEPHONE (OUTPATIENT)
Dept: FAMILY MEDICINE CLINIC | Age: 60
End: 2020-11-03

## 2020-11-03 NOTE — TELEPHONE ENCOUNTER
Plz send Rx for 90 day supply of test strips for Accucheck Guide Me to [facility]. Pt's old meter went bad and this was the new one that he was given. The strips for the old meter don't fit the new one.

## 2020-11-12 NOTE — PROGRESS NOTES
[facility]) Dermatology  Caryle Ivans, MD  [address] [facility]  1960    56 y.o. male     Date of Visit: 11/16/2020    Last Visit: 2mo    Chief Complaint: Lesion    History of Present Illness:  1. Pt complains of rough lesions on scalp and nose     Derm History:   -Epidermoid cyst, midline mid back  -History of actinic keratoses s/p cryotherapy. Review of Systems:  Constitutional: Reports general sense of well-being. Allergies: Reviewed and updated. Past Medical History, Surgical History, Medications and Allergies reviewed. Past Medical History:   Diagnosis Date    Chronic back pain     Chronic pain     Diverticulitis     DVT (deep venous thrombosis) (HCC)     Hyperlipidemia     Irritable bowel syndrome     Pain medication agreement signed     discontued care due eye surgery/imbilical hernia surgery -    Type II or unspecified type diabetes mellitus without mention of complication, not stated as uncontrolled     Umbilical hernia     having surgery 11/23     Past Surgical History:   Procedure Laterality Date    ABDOMEN SURGERY      ABDOMINAL ADHESION SURGERY      CATARACT REMOVAL WITH IMPLANT Left 11/13/15    COLON SURGERY      times 3    COLONOSCOPY      HERNIA REPAIR      OTHER SURGICAL HISTORY  9-26-12    EXCISION OF RIGHT THYROID NODULE          THROAT SURGERY Bilateral october 2013    TONSILLECTOMY      UPPER GASTROINTESTINAL ENDOSCOPY  02/10/2017    gastritis/duodenitis       Allergies   Allergen Reactions    Codeine Hives and Nausea Only     25 years ago-unsure if allergy stated    Dilaudid [Hydromorphone Hcl]     Fentanyl      Patch caused skin irritation. IV does not bother pt has had fentanyl IV before.      Toradol [Ketorolac Tromethamine]      Outpatient Medications Marked as Taking for the 11/16/20 encounter (Office Visit) with Caryle Ivans, MD   Medication Sig Dispense Refill    blood glucose test strips (ACCU-CHEK COMFORT CURVE) strip Accu-Chek guide me glucometer strips-up to 3 times per day. 300 each 3    insulin glargine (LANTUS SOLOSTAR) 100 UNIT/ML injection pen Inject 40 Units into the skin 2 times daily 5 pen 3    metFORMIN (GLUCOPHAGE) 1000 MG tablet Take 1 tablet by mouth twice a day 180 tablet 1    pantoprazole (PROTONIX) 40 MG tablet TAKE 1 TABLET BY MOUTH DAILY 90 tablet 2    insulin aspart (NOVOLOG FLEXPEN) 100 UNIT/ML injection pen Inject up to 15 units subcutaneously twice a day per sliding scale (Patient taking differently: Inject up to 4 units subcutaneously  per sliding scale) 30 mL 5    lisinopril (PRINIVIL;ZESTRIL) 20 MG tablet Take 1 tablet by mouth every day 90 tablet 1    Insulin Pen Needle (B-D UF III MINI PEN NEEDLES) 31G X 5 MM MISC Use up to 3 times a day 100 each 3    atorvastatin (LIPITOR) 40 MG tablet Take 1 tablet by mouth every day 90 tablet 3    ACCU-CHEK MULTICLIX LANCETS MISC 1 each by Does not apply route 3 times daily 300 each 3    Blood Glucose Monitoring Suppl (ACCU-CHEK COMPACT CARE KIT) KIT 1 each by Does not apply route daily 1 kit 0    ACCU-CHEK FASTCLIX LANCETS MISC USE 3x TIMES DAILY 410 each 3    ONETOUCH DELICA LANCETS FINE MISC Use 3 times a day 100 each 3    glucose monitoring kit (FREESTYLE) monitoring kit 1 kit by Does not apply route daily as needed (once daily) One touch ultra 2 Dx E11.9 1 kit 0    Probiotic Product (PROBIOTIC-10 ULTIMATE PO) Take by mouth      dicyclomine (BENTYL) 10 MG capsule       aspirin 81 MG chewable tablet Take 81 mg by mouth daily      Blood Glucose Monitoring Suppl (ACCU-CHEK AMA PLUS) W/DEVICE KIT USE AS DIRECTED 1 kit 0       Physical Examination     The following were examined and determined to be normal: Psych/Neuro. The following were examined and determined to be abnormal: Scalp/hair and Head/face. -General: Well-appearing, NAD  1.  Vertex scalp 4, R NF sulcus 1 - ill-defined irregularly-shaped roughly-scaling thin pink macule(s)/papule(s)     Assessment and Plan     1. Actinic keratosis(es)  -Edu re: relationship with chronic cumulative sun exposure, low premalignant potential.   -5 lesion(s) treated w/ liquid nitrogen x 2 cycles - Vertex scalp 4, R NF sulcus 1. Edu re: risk of blister formation, discomfort, scar, hypopigmentation. Discussed wound care.

## 2020-11-16 ENCOUNTER — OFFICE VISIT (OUTPATIENT)
Dept: DERMATOLOGY | Age: 60
End: 2020-11-16
Payer: COMMERCIAL

## 2020-11-16 VITALS — TEMPERATURE: 97.5 F

## 2020-11-16 PROCEDURE — 17003 DESTRUCT PREMALG LES 2-14: CPT | Performed by: DERMATOLOGY

## 2020-11-16 PROCEDURE — 17000 DESTRUCT PREMALG LESION: CPT | Performed by: DERMATOLOGY

## 2020-11-17 RX ORDER — DIAZEPAM 10 MG/1
TABLET ORAL
Qty: 40 TABLET | Refills: 0 | Status: SHIPPED | OUTPATIENT
Start: 2020-11-17 | End: 2020-12-30

## 2020-11-17 NOTE — TELEPHONE ENCOUNTER
.  Last office visit 10/6/2020     Last written 10/13/2020 40 with 0      Next office visit scheduled 1/11/2021    Requested Prescriptions     Pending Prescriptions Disp Refills    diazePAM (VALIUM) 10 MG tablet [Pharmacy Med Name: DIAZEPAM 10 MG TABLET] 40 tablet 0     Sig: TAKE 1.5 TABLETS BY MOUTH DAILY AS NEEDED *TO LAST 30 DAYS*

## 2020-11-19 RX ORDER — PANTOPRAZOLE SODIUM 40 MG/1
TABLET, DELAYED RELEASE ORAL
Qty: 90 TABLET | Refills: 3 | Status: SHIPPED | OUTPATIENT
Start: 2020-11-19 | End: 2021-02-06 | Stop reason: SDUPTHER

## 2020-12-23 RX ORDER — INSULIN ASPART 100 [IU]/ML
INJECTION, SOLUTION INTRAVENOUS; SUBCUTANEOUS
Qty: 30 ML | Refills: 5 | Status: SHIPPED | OUTPATIENT
Start: 2020-12-23 | End: 2021-02-06 | Stop reason: SDUPTHER

## 2020-12-23 NOTE — TELEPHONE ENCOUNTER
Pt called to have his Novolog Flexpen [facility]

## 2020-12-23 NOTE — TELEPHONE ENCOUNTER
.  Last office visit 10/6/2020     Last written 1- 30ml with 5      Next office visit scheduled 1/11/2021    Requested Prescriptions     Pending Prescriptions Disp Refills    insulin aspart (NOVOLOG FLEXPEN) 100 UNIT/ML injection pen 30 mL 5     Sig: Inject up to 15 units subcutaneously twice a day per sliding scale

## 2020-12-28 NOTE — TELEPHONE ENCOUNTER
Refill Request     Last Seen: 10/6/2020    Last Written: #40  0rf  11/17/2020    Next Appointment:   Future Appointments   Date Time Provider Barby Zepeda   1/11/2021 10:00 AM DO LUIS M Sawyer [facility]   1/27/2021  8:40 AM AMADA Sauceda CNP AND S[facility]       Appointment scheduled      Requested Prescriptions     Pending Prescriptions Disp Refills    diazePAM (VALIUM) 10 MG tablet [Pharmacy Med Name: DIAZEPAM 10 MG TABLET] 40 tablet 0     Sig: TAKE 1.5 TABLETS BY MOUTH DAILY AS NEEDED *TO LAST 30 DAYS*

## 2020-12-30 RX ORDER — DIAZEPAM 10 MG/1
TABLET ORAL
Qty: 40 TABLET | Refills: 0 | Status: SHIPPED | OUTPATIENT
Start: 2020-12-30 | End: 2021-01-25

## 2021-01-14 ENCOUNTER — TELEPHONE (OUTPATIENT)
Dept: ENDOCRINOLOGY | Age: 61
End: 2021-01-14

## 2021-01-20 ENCOUNTER — VIRTUAL VISIT (OUTPATIENT)
Dept: FAMILY MEDICINE CLINIC | Age: 61
End: 2021-01-20
Payer: COMMERCIAL

## 2021-01-20 DIAGNOSIS — F51.02 ADJUSTMENT INSOMNIA: Primary | ICD-10-CM

## 2021-01-20 PROBLEM — F41.9 ANXIETY: Status: ACTIVE | Noted: 2021-01-20

## 2021-01-20 PROCEDURE — 99212 OFFICE O/P EST SF 10 MIN: CPT | Performed by: FAMILY MEDICINE

## 2021-01-20 RX ORDER — TRAZODONE HYDROCHLORIDE 50 MG/1
50 TABLET ORAL NIGHTLY PRN
Qty: 30 TABLET | Refills: 0 | Status: SHIPPED | OUTPATIENT
Start: 2021-01-20 | End: 2021-01-22

## 2021-01-20 ASSESSMENT — ENCOUNTER SYMPTOMS
BACK PAIN: 1
SHORTNESS OF BREATH: 0
COUGH: 0

## 2021-01-20 NOTE — PROGRESS NOTES
Subjective:      Patient ID: Ulysses Crimes is a 56 y.o. male. HPI  Patient on a video visit for 3-month checkup on insomnia. He is on Valium 10 mg now and has been for at least several years and it is not really working at all the last few months and he would like to get off of it and do something else. He is also dealing with a disc problem in his back that is going down one leg and he is working on a second opinion. He denies any other issues to discuss at this time. He is at home for the visit he is alone and on the visit we have permission for the visit and I am in my office at [facility]. Prior to Visit Medications :  Medication traZODone (DESYREL) 50 MG tablet, Sig Take 1 tablet by mouth nightly as needed for Sleep May increase to 2 at bedtime after 2 nights, Taking? Yes, Authorizing Provider Miguel Snow, DO    Medication diazePAM (VALIUM) 10 MG tablet, Sig TAKE 1.5 TABLETS BY MOUTH DAILY AS NEEDED *TO LAST 30 DAYS*, Taking? Yes, Authorizing Provider Miguel Snow, DO    Medication insulin aspart (NOVOLOG FLEXPEN) 100 UNIT/ML injection pen, Sig Inject up to 15 units subcutaneously twice a day per sliding scale, Taking? Yes, Authorizing Provider Miguel Snow, DO    Medication metFORMIN (GLUCOPHAGE) 1000 MG tablet, Sig Take 1 tablet by mouth twice a day, Taking? Yes, Authorizing Provider Miguel Snow, DO    Medication pantoprazole (PROTONIX) 40 MG tablet, Sig TAKE 1 TABLET BY MOUTH DAILY, Taking? Yes, Authorizing Provider Miguel Snow, DO    Medication blood glucose test strips (ACCU-CHEK COMFORT CURVE) strip, Sig Accu-Chek guide me glucometer stripsup to 3 times per day., Taking? Yes, Authorizing Provider Miguel Snow, DO    Medication insulin glargine (LANTUS SOLOSTAR) 100 UNIT/ML injection pen, Sig Inject 40 Units into the skin 2 times daily, Taking?  Yes, Authorizing Provider Juliette Galeano, APRN - CNP Medication Insulin Pen Needle (B-D UF III MINI PEN NEEDLES) 31G X 5 MM MISC, Sig Use up to 3 times a day, Taking? Yes, Authorizing Provider Miguel Snow, DO    Medication atorvastatin (LIPITOR) 40 MG tablet, Sig Take 1 tablet by mouth every day, Taking? Yes, Authorizing Provider Miguel Snow, DO    Medication ACCU-CHEK MULTICLIX LANCETS MISC, Sig 1 each by Does not apply route 3 times daily, Taking? Yes, Authorizing Provider Miguel Snow, DO    Medication Blood Glucose Monitoring Suppl (ACCU-CHEK COMPACT CARE KIT) KIT, Sig 1 each by Does not apply route daily, Taking? Yes, Authorizing Provider Miguel Snow, DO    Medication ACCU-CHEK FASTCLIX LANCETS MISC, Sig USE 3x TIMES DAILY, Taking? Yes, Authorizing Provider Monique Clamp Edy, DO    Medication ONETOUCH DELICA LANCETS FINE MISC, Sig Use 3 times a day, Taking? Yes, Authorizing Provider Monique Clamp Edy, DO    Medication glucose monitoring kit (FREESTYLE) monitoring kit, Sig 1 kit by Does not apply route daily as needed (once daily) One touch ultra 2 Dx E11.9, Taking? Yes, Authorizing Provider Miguel Snow, DO    Medication Probiotic Product (PROBIOTIC-10 ULTIMATE PO), Sig Take by mouth, Taking? Yes, Authorizing Provider Historical Provider, MD    Medication dicyclomine (BENTYL) 10 MG capsule, Sig , Taking? Yes, Authorizing Provider Historical Provider, MD    Medication aspirin 81 MG chewable tablet, Sig Take 81 mg by mouth daily, Taking? Yes, Authorizing Provider Historical Provider, MD    Medication Blood Glucose Monitoring Suppl (ACCU-CHEK AMA PLUS) W/DEVICE KIT, Sig USE AS DIRECTED, Taking?  Yes, Authorizing Provider Monique Clamp Edy, DO    Medication lisinopril (PRINIVIL;ZESTRIL) 20 MG tablet, Sig Take 1 tablet by mouth every day  Patient not taking: Reported on 1/20/2021, Taking? , Authorizing Provider Kirk Salvador DO      Past Medical History:  No date: Chronic back pain  No date: Chronic pain  No date: Diverticulitis No date: DVT (deep venous thrombosis) ([facility])  No date: Hyperlipidemia  No date: Irritable bowel syndrome  No date: Pain medication agreement signed      Comment:  discontued care due eye surgery/imbilical hernia surgery               -  No date: Type II or unspecified type diabetes mellitus without   mention of complication, not stated as uncontrolled  No date: Umbilical hernia      Comment:  having surgery 11/23        Review of Systems    Review of Systems   Constitutional: Negative for fever. Respiratory: Negative for cough and shortness of breath. Musculoskeletal: Positive for arthralgias and back pain. Psychiatric/Behavioral: Positive for sleep disturbance. Objective:   Physical Exam      Physical Exam  Constitutional:       General: He is not in acute distress. Appearance: Normal appearance. He is obese. He is not ill-appearing. Neurological:      Mental Status: He is alert and oriented to person, place, and time. Psychiatric:         Mood and Affect: Mood normal.         Behavior: Behavior normal.         Thought Content: Thought content normal.         Judgment: Judgment normal.         Assessment:       Diagnosis Orders   1. Adjustment insomnia           Plan:      Leyda Murry was seen today for 3 month follow-up. Diagnoses and all orders for this visit:    Adjustment insomnia  I recommend you take one half Valium 10 mg for 3 nights and then take a fourth of a Valium at night for 3 nights and then discontinue. Prescription has been sent for trazodone 50 mgmay increase to 100 mg after 3 nights. Keep me up-to-date on the efficacy of the medication through [address] Other orders  -     traZODone (DESYREL) 50 MG tablet;  Take 1 tablet by mouth nightly as needed for Sleep May increase to 2 at bedtime after 2 nights            Miguel Snow, DO

## 2021-01-22 RX ORDER — MIRTAZAPINE 15 MG/1
15 TABLET, FILM COATED ORAL NIGHTLY
Qty: 30 TABLET | Refills: 0 | Status: SHIPPED | OUTPATIENT
Start: 2021-01-22 | End: 2021-01-25

## 2021-01-25 DIAGNOSIS — F51.02 ADJUSTMENT INSOMNIA: Primary | ICD-10-CM

## 2021-01-25 RX ORDER — ZOLPIDEM TARTRATE 5 MG/1
5 TABLET ORAL NIGHTLY PRN
Qty: 15 TABLET | Refills: 0 | Status: SHIPPED | OUTPATIENT
Start: 2021-01-25 | End: 2021-02-01

## 2021-01-27 ENCOUNTER — OFFICE VISIT (OUTPATIENT)
Dept: ENDOCRINOLOGY | Age: 61
End: 2021-01-27
Payer: COMMERCIAL

## 2021-01-27 VITALS
HEIGHT: 72 IN | WEIGHT: 219 LBS | HEART RATE: 80 BPM | SYSTOLIC BLOOD PRESSURE: 130 MMHG | BODY MASS INDEX: 29.66 KG/M2 | DIASTOLIC BLOOD PRESSURE: 80 MMHG | OXYGEN SATURATION: 97 %

## 2021-01-27 DIAGNOSIS — Z79.4 TYPE 2 DIABETES MELLITUS WITHOUT COMPLICATION, WITH LONG-TERM CURRENT USE OF INSULIN (HCC): Primary | ICD-10-CM

## 2021-01-27 DIAGNOSIS — E11.9 TYPE 2 DIABETES MELLITUS WITHOUT COMPLICATION, WITH LONG-TERM CURRENT USE OF INSULIN (HCC): Primary | ICD-10-CM

## 2021-01-27 LAB — HBA1C MFR BLD: 5.9 %

## 2021-01-27 PROCEDURE — 83036 HEMOGLOBIN GLYCOSYLATED A1C: CPT | Performed by: NURSE PRACTITIONER

## 2021-01-27 PROCEDURE — 99213 OFFICE O/P EST LOW 20 MIN: CPT | Performed by: NURSE PRACTITIONER

## 2021-01-27 ASSESSMENT — ENCOUNTER SYMPTOMS
CONSTIPATION: 0
COLOR CHANGE: 0
EYE PAIN: 0
SHORTNESS OF BREATH: 0
DIARRHEA: 0
NAUSEA: 0

## 2021-01-27 NOTE — PROGRESS NOTES
Blood glucose trends:  Patient brought log glucometer for download  Readings per day  2 per patient report  BG Range 50 -300--> 61- 225  Hypoglycemia awareness and symptoms: dizzy,   Has not done CGM    Current Medication regimen:   Biguanides: 1000 mg BID  STRICKLAND:  glipizide 7.5 mg BID  GLP1: no , h/o pancreatitis  SGLT2: trouble urinating with BPH,   Insulin:   Lantus: 36 units BID--> 21 BID--> 36 BID  Novolog: on SSI ( not available to patient) AC TID-->> 6 units fixed dose 2: 50 > 175    Other meds tried in past: januvia  GFR > 60    Current Dietary regimen:   BF: wheat toast with eggs  Lunch: salad with tuna/chicken  Dinner: meat/green veggies/   Snacks yoghurt. blueberries  Beverages: water   Average carbs per day: < 100 grams of carbs    Microvascular Complications:  · Neuropathy: denies concerns  · Retinopathy: no concerns with vision, field of vision  · Nephropathy: no microalbuminuria no recent MACR    Diabetic Health Maintenance   · Last Eye Exam: > 6 months  · Last Foot exam: none  · Has patient seen a dietitian? Yes  · Current Exercise: No structured exercise  · On ACEI or ARB: lisinopril 20 mg  · On statin: lipitor 40 mg    Hyperlipidemia: Current complaints include occasional myalgias but otherwise tolerates well. Lab Results   Component Value Date    CHOL 123 08/21/2020    CHOL 116 09/09/2019    CHOL 133 12/20/2017     Lab Results   Component Value Date    TRIG 103 08/21/2020    TRIG 74 09/09/2019    TRIG 64 12/20/2017     Lab Results   Component Value Date    HDL 46 08/21/2020    HDL 51 09/09/2019    HDL 52 12/20/2017     Lab Results   Component Value Date    LDLCALC 56 08/21/2020    LDLCALC 50 09/09/2019    LDLCALC 68 12/20/2017     No results found for: LDLDIRECT  No results found for: CHOLHDLRATIO    Vitamin D deficiency: Currently is on no supplementation. Current complaints include fatigue on daily basis.    Last vitamin Dlevel is:  Lab Results   Component Value Date    VITD25 31.1 09/16/2020 Hypertension  Controlled , denies symptoms of dizziness, light headedness. Occasional dependent edema. Tries to follow a salt restricted diet. Lab Results   Component Value Date     10/13/2020    K 4.0 10/13/2020     10/13/2020    CO2 23 10/13/2020    BUN 15 10/13/2020    CREATININE 1.2 10/13/2020    GLUCOSE 44 (LL) 10/13/2020    CALCIUM 9.6 10/13/2020    PROT 7.0 10/13/2020    LABALBU 4.2 10/13/2020    BILITOT 0.4 10/13/2020    ALKPHOS 56 10/13/2020    AST 23 10/13/2020    ALT 24 10/13/2020    LABGLOM >60 10/13/2020    GFRAA >60 10/13/2020    AGRATIO 1.5 10/13/2020    GLOB 2.8 10/13/2020     The ASCVD Risk score (Flor Christopher et al., 2013) failed to calculate for the following reasons: The valid total cholesterol range is 130 to 320 mg/dL    Family History   Problem Relation Age of Onset    Cancer Mother     Heart Disease Father     Cancer Other     Heart Disease Other      Review of Systems   Constitutional: Negative for activity change, appetite change, diaphoresis, fever and unexpected weight change. HENT: Negative for dental problem. Eyes: Negative for pain and visual disturbance. Respiratory: Negative for shortness of breath. Cardiovascular: Negative for chest pain, palpitations and leg swelling. Gastrointestinal: Negative for constipation, diarrhea and nausea. Endocrine: Negative for cold intolerance, heat intolerance, polydipsia, polyphagia and polyuria. Genitourinary: Negative for frequency and urgency. Musculoskeletal: Negative for arthralgias, joint swelling and myalgias. Skin: Negative for color change and pallor. Neurological: Negative for weakness, numbness and headaches. Psychiatric/Behavioral: Negative for dysphoric mood and sleep disturbance. The patient is not nervous/anxious. Vitals:    01/27/21 0839   BP: 130/80   Pulse: 80   SpO2: 97%   Weight: 219 lb (99.3 kg)   Height: 6' (1.829 m)     Physical Exam  Vitals signs reviewed.    Constitutional: Appearance: He is well-developed. Eyes:      Pupils: Pupils are equal, round, and reactive to light. Neck:      Musculoskeletal: Normal range of motion. Thyroid: No thyromegaly. Cardiovascular:      Rate and Rhythm: Normal rate and regular rhythm. Heart sounds: Normal heart sounds. Pulmonary:      Effort: Pulmonary effort is normal.      Breath sounds: Normal breath sounds. Abdominal:      General: There is no distension. Musculoskeletal: Normal range of motion. Skin:     General: Skin is warm and dry. Comments: No skin changes or evidence of trauma. Neurological:      Mental Status: He is alert and oriented to person, place, and time. Sensory: No sensory deficit. Psychiatric:         Behavior: Behavior normal.         Thought Content: Thought content normal.       Skeletal foot exam:   Toenails are normal.   Pulses are +2 DP bilaterally. Sensory: 10 g monofilament is 10/10 on the right and 10/10 on the left,   128 Hz vibration sense is present bilaterally. Piper Hudson is a 56 y.o. male with uncontrolled Diabetes Mellitus type 2  associated with HLD and HTN    Plan  Diabetes Mellitus Type 2         A1c is 5.9  Exceeded goal  Significant lows in the FBG  Start Lantus 20 units twice a day ( was on total of 30 BID)  Decrease night time dose by 1 unit everytime the morning fasting blood sugar is less than 90  If lows continue at night may stop night time dose  Fasting blood sugar goal is 90 -120    Humalog  Sliding scale:    The sliding scale covers your blood sugar numbers before you eat  151- 200 Add  2 units  201- 250 Add 4 units  251- 300 Add 6 units   301- 350 Add 8 units  351- 400 Add 10 units  If > 400 : take fixed dose  +10 units, recheck blood sugar in 4 hours and dose according to the sliding scale only    Diet :   Reviewed logs; continue on current diet  30-40 grams per meal , 3 meals per day, avoid carbs in snack choices Include moderate proteins and good fats   Ensure adequate hydration and  electrolyte replacement    Exercise :  Recommended exercise is 5-7 days a week for 30-60 mins at least, per day OR a total of 2.5 hours per week , which ever is more feasible. Ambulate as possible     Diabetic Health Maintenance  Follow up with annual eye exams  Follow up with annual podiatry exams if needed  Reviewed sick day management of BG     Other areas of Diabetic Education reviewed:  ? Carbs: good carbs and bad carbs, importance of carb counting, incorporation of protein with each meal to reduce Glycemic index, importance of portions, Carb/insulin ratio  ? Fats: Good fats and bad fats, meal planning and supplements. ? Discussed how food affects blood sugar readings. ? Different diabetic medications  ? Managing high and low sugar readings  ? Rotation of sites for subcutaneous medication injection    Mixed Hyperlipidemia  Lab Results   Component Value Date    LDLCALC 56 08/21/2020    LDLCALC 50 09/09/2019    LDLCALC 68 12/20/2017     No results found for: LDLDIRECT  Lab Results   Component Value Date    TRIG 103 08/21/2020    TRIG 74 09/09/2019    TRIG 64 12/20/2017     LDL goal  < 100;   TG elevated at   Continue current regimen  Managed by PCP    Essential Hypertension  Blood pressure controlled. Continue current regimen    Vitamin D deficiency  Lab Results   Component Value Date    VITD25 31.1 09/16/2020     Continue to supplement    Problem List Items Addressed This Visit     Type 2 diabetes mellitus without complication ([facility] [address] - Primary    Relevant Orders    POCT glycosylated hemoglobin (Hb A1C) (Completed)          Greater than 40 minutes spent directly counseling patient about topics listed above (such as lifestyle modifications, preventative screenings and/or disease related processes. No follow-ups on file.

## 2021-01-27 NOTE — PATIENT INSTRUCTIONS
Start Lantus 20 units twice a day  Decrease night time dose by 1 unit everytime the morning fasting blood sugar is less than 90

## 2021-01-28 DIAGNOSIS — F51.02 ADJUSTMENT INSOMNIA: Primary | ICD-10-CM

## 2021-01-28 NOTE — TELEPHONE ENCOUNTER
Refill Request     Last Seen: 1/20/2021    Last Written: 1/21/2021    Next Appointment:   Future Appointments   Date Time Provider Barby Zepeda   4/21/2021  4:30 PM Esvin Garrett [facility]   5/14/2021  9:40 AM AMADA White CNP AND [facility]       Future appointment scheduled      Requested Prescriptions      No prescriptions requested or ordered in this encounter

## 2021-01-29 ENCOUNTER — TELEPHONE (OUTPATIENT)
Dept: FAMILY MEDICINE CLINIC | Age: 61
End: 2021-01-29

## 2021-01-29 NOTE — TELEPHONE ENCOUNTER
Received faxed request for a refill on trazadone 50 mg tablets.   [facility] [place]  Last fill shows discontinued

## 2021-02-01 ENCOUNTER — TELEPHONE (OUTPATIENT)
Dept: FAMILY MEDICINE CLINIC | Age: 61
End: 2021-02-01

## 2021-02-01 DIAGNOSIS — F51.02 ADJUSTMENT INSOMNIA: Primary | ICD-10-CM

## 2021-02-01 RX ORDER — TRAZODONE HYDROCHLORIDE 50 MG/1
50 TABLET ORAL NIGHTLY PRN
Qty: 30 TABLET | Refills: 0 | OUTPATIENT
Start: 2021-02-01

## 2021-02-01 RX ORDER — ZOLPIDEM TARTRATE 10 MG/1
10 TABLET ORAL NIGHTLY PRN
Qty: 30 TABLET | Refills: 1 | Status: SHIPPED | OUTPATIENT
Start: 2021-02-01 | End: 2021-03-30

## 2021-02-01 NOTE — TELEPHONE ENCOUNTER
I called in a 10 mg of Ambien so just take 1--I want him to  some generic Benadryl 25 mg and take 1 about an hour before the Ambien.

## 2021-02-01 NOTE — TELEPHONE ENCOUNTER
Two tablets of Ambien only getting 3 hours of sleep a night. Wonders if he can have refill or advise of any changes. Please see [employer] message. Pt is not taking trazedone. Is out of Ambien now and he is requesting a refill. Refill has not been pended - as PCP may change the med.

## 2021-02-01 NOTE — TELEPHONE ENCOUNTER
Spoke with him, relayed message and told him to give us an update in a couple days of how that's doing.

## 2021-02-06 DIAGNOSIS — E11.9 TYPE 2 DIABETES MELLITUS WITHOUT COMPLICATION, WITH LONG-TERM CURRENT USE OF INSULIN (HCC): ICD-10-CM

## 2021-02-06 DIAGNOSIS — Z79.4 TYPE 2 DIABETES MELLITUS WITHOUT COMPLICATION, WITH LONG-TERM CURRENT USE OF INSULIN (HCC): ICD-10-CM

## 2021-02-07 RX ORDER — INSULIN GLARGINE 100 [IU]/ML
40 INJECTION, SOLUTION SUBCUTANEOUS 2 TIMES DAILY
Qty: 5 PEN | Refills: 3 | Status: SHIPPED | OUTPATIENT
Start: 2021-02-07 | End: 2021-02-08 | Stop reason: SDUPTHER

## 2021-02-08 ENCOUNTER — TELEPHONE (OUTPATIENT)
Dept: FAMILY MEDICINE CLINIC | Age: 61
End: 2021-02-08

## 2021-02-08 NOTE — TELEPHONE ENCOUNTER
Spoke to pt and he needs the start date to be today and return back to work be 2/11 ( to go back to work without symptoms)  due to pt waking up this morning feeling sick. Pt stated that they were flu symptoms but doesn't have a fever or is congested. Pts son had covid and said this is how his started. Pt also stated that the letter needs to the company before 5 or pt will lose the job.  Please Advise

## 2021-02-08 NOTE — TELEPHONE ENCOUNTER
Pt calling back to check on status of letter. He has to have this in before 5pm. Can also be emailed to him at [email or web address]

## 2021-02-08 NOTE — TELEPHONE ENCOUNTER
I wrote a letter but he must go to the flu clinic tomorrow give him the number and tell him to call and set up a time-only between 8 and 1 tomorrow.

## 2021-02-09 ENCOUNTER — TELEPHONE (OUTPATIENT)
Dept: FAMILY MEDICINE CLINIC | Age: 61
End: 2021-02-09

## 2021-02-09 RX ORDER — INSULIN GLARGINE 100 [IU]/ML
40 INJECTION, SOLUTION SUBCUTANEOUS 2 TIMES DAILY
Qty: 15 PEN | Refills: 1 | Status: SHIPPED | OUTPATIENT
Start: 2021-02-09 | End: 2022-08-16 | Stop reason: SDUPTHER

## 2021-02-09 RX ORDER — BLOOD SUGAR DIAGNOSTIC
1 STRIP MISCELLANEOUS DAILY
COMMUNITY
End: 2022-08-16 | Stop reason: SDUPTHER

## 2021-02-09 NOTE — TELEPHONE ENCOUNTER
[facility] calls for clarification of test strips. Pt uses the Accu Chek Glide test strip.     Verbal Order to pharmacist to change the order for test strips

## 2021-02-12 ENCOUNTER — NURSE TRIAGE (OUTPATIENT)
Dept: OTHER | Facility: CLINIC | Age: 61
End: 2021-02-12

## 2021-02-12 ENCOUNTER — APPOINTMENT (OUTPATIENT)
Dept: CT IMAGING | Age: 61
End: 2021-02-12
Payer: COMMERCIAL

## 2021-02-12 ENCOUNTER — OFFICE VISIT (OUTPATIENT)
Dept: FAMILY MEDICINE CLINIC | Age: 61
End: 2021-02-12
Payer: COMMERCIAL

## 2021-02-12 ENCOUNTER — HOSPITAL ENCOUNTER (OUTPATIENT)
Age: 61
Setting detail: OBSERVATION
Discharge: HOME HEALTH | End: 2021-02-13
Attending: INTERNAL MEDICINE | Admitting: INTERNAL MEDICINE
Payer: COMMERCIAL

## 2021-02-12 ENCOUNTER — APPOINTMENT (OUTPATIENT)
Dept: GENERAL RADIOLOGY | Age: 61
End: 2021-02-12
Payer: COMMERCIAL

## 2021-02-12 ENCOUNTER — HOSPITAL ENCOUNTER (OUTPATIENT)
Dept: VASCULAR LAB | Age: 61
Discharge: HOME | End: 2021-02-12
Payer: COMMERCIAL

## 2021-02-12 ENCOUNTER — HOSPITAL ENCOUNTER (EMERGENCY)
Age: 61
Discharge: ANOTHER HOSPITAL | End: 2021-02-12
Attending: EMERGENCY MEDICINE
Payer: COMMERCIAL

## 2021-02-12 VITALS
TEMPERATURE: 97.3 F | RESPIRATION RATE: 16 BRPM | HEART RATE: 72 BPM | OXYGEN SATURATION: 96 % | HEIGHT: 72 IN | WEIGHT: 212 LBS | SYSTOLIC BLOOD PRESSURE: 130 MMHG | BODY MASS INDEX: 28.71 KG/M2 | DIASTOLIC BLOOD PRESSURE: 70 MMHG

## 2021-02-12 VITALS
OXYGEN SATURATION: 97 % | SYSTOLIC BLOOD PRESSURE: 136 MMHG | TEMPERATURE: 97.9 F | DIASTOLIC BLOOD PRESSURE: 82 MMHG | RESPIRATION RATE: 16 BRPM | HEART RATE: 83 BPM

## 2021-02-12 DIAGNOSIS — R20.0 LEFT SIDED NUMBNESS: Primary | ICD-10-CM

## 2021-02-12 DIAGNOSIS — R20.0 LEFT FACIAL NUMBNESS: Primary | ICD-10-CM

## 2021-02-12 DIAGNOSIS — R09.89 HOMANS SIGN PRESENT: Primary | ICD-10-CM

## 2021-02-12 DIAGNOSIS — R07.89 OTHER CHEST PAIN: ICD-10-CM

## 2021-02-12 DIAGNOSIS — M79.662 PAIN OF LEFT CALF: ICD-10-CM

## 2021-02-12 DIAGNOSIS — R09.89 HOMANS SIGN PRESENT: ICD-10-CM

## 2021-02-12 PROBLEM — I63.9 CEREBROVASCULAR ACCIDENT (CVA) (HCC): Status: ACTIVE | Noted: 2021-02-12

## 2021-02-12 PROBLEM — R10.9 ABDOMINAL PAIN: Status: ACTIVE | Noted: 2021-02-12

## 2021-02-12 LAB
A/G RATIO: 1.4 (ref 1.1–2.2)
ALBUMIN SERPL-MCNC: 4.5 G/DL (ref 3.4–5)
ALP BLD-CCNC: 72 U/L (ref 40–129)
ALT SERPL-CCNC: 18 U/L (ref 10–40)
ANION GAP SERPL CALCULATED.3IONS-SCNC: 12 MMOL/L (ref 3–16)
AST SERPL-CCNC: 19 U/L (ref 15–37)
BASOPHILS ABSOLUTE: 0 K/UL (ref 0–0.2)
BASOPHILS RELATIVE PERCENT: 0.5 %
BILIRUB SERPL-MCNC: 0.5 MG/DL (ref 0–1)
BUN BLDV-MCNC: 23 MG/DL (ref 7–20)
CALCIUM SERPL-MCNC: 9.7 MG/DL (ref 8.3–10.6)
CHLORIDE BLD-SCNC: 100 MMOL/L (ref 99–110)
CO2: 26 MMOL/L (ref 21–32)
CREAT SERPL-MCNC: 1.2 MG/DL (ref 0.8–1.3)
EKG ATRIAL RATE: 75 BPM
EKG DIAGNOSIS: NORMAL
EKG P AXIS: 42 DEGREES
EKG P-R INTERVAL: 160 MS
EKG Q-T INTERVAL: 434 MS
EKG QRS DURATION: 142 MS
EKG QTC CALCULATION (BAZETT): 484 MS
EKG R AXIS: 13 DEGREES
EKG T AXIS: 21 DEGREES
EKG VENTRICULAR RATE: 75 BPM
EOSINOPHILS ABSOLUTE: 0 K/UL (ref 0–0.6)
EOSINOPHILS RELATIVE PERCENT: 0.4 %
GFR AFRICAN AMERICAN: >60
GFR NON-AFRICAN AMERICAN: >60
GLOBULIN: 3.2 G/DL
GLUCOSE BLD-MCNC: 108 MG/DL (ref 70–99)
GLUCOSE BLD-MCNC: 128 MG/DL (ref 70–99)
GLUCOSE BLD-MCNC: 137 MG/DL (ref 70–99)
GLUCOSE BLD-MCNC: 77 MG/DL (ref 70–99)
HCT VFR BLD CALC: 42 % (ref 40.5–52.5)
HEMOGLOBIN: 14.2 G/DL (ref 13.5–17.5)
INR BLD: 1.07 (ref 0.86–1.14)
LYMPHOCYTES ABSOLUTE: 2.3 K/UL (ref 1–5.1)
LYMPHOCYTES RELATIVE PERCENT: 33.6 %
MCH RBC QN AUTO: 29.6 PG (ref 26–34)
MCHC RBC AUTO-ENTMCNC: 33.7 G/DL (ref 31–36)
MCV RBC AUTO: 87.9 FL (ref 80–100)
MONOCYTES ABSOLUTE: 0.6 K/UL (ref 0–1.3)
MONOCYTES RELATIVE PERCENT: 8.1 %
NEUTROPHILS ABSOLUTE: 3.9 K/UL (ref 1.7–7.7)
NEUTROPHILS RELATIVE PERCENT: 57.4 %
PDW BLD-RTO: 13.6 % (ref 12.4–15.4)
PERFORMED ON: ABNORMAL
PERFORMED ON: ABNORMAL
PERFORMED ON: NORMAL
PLATELET # BLD: 207 K/UL (ref 135–450)
PMV BLD AUTO: 8.7 FL (ref 5–10.5)
POTASSIUM REFLEX MAGNESIUM: 4.8 MMOL/L (ref 3.5–5.1)
PROTHROMBIN TIME: 12.4 SEC (ref 10–13.2)
RBC # BLD: 4.78 M/UL (ref 4.2–5.9)
SODIUM BLD-SCNC: 138 MMOL/L (ref 136–145)
TOTAL PROTEIN: 7.7 G/DL (ref 6.4–8.2)
TROPONIN: <0.01 NG/ML
WBC # BLD: 6.9 K/UL (ref 4–11)

## 2021-02-12 PROCEDURE — 70450 CT HEAD/BRAIN W/O DYE: CPT

## 2021-02-12 PROCEDURE — 99283 EMERGENCY DEPT VISIT LOW MDM: CPT

## 2021-02-12 PROCEDURE — 6360000004 HC RX CONTRAST MEDICATION: Performed by: EMERGENCY MEDICINE

## 2021-02-12 PROCEDURE — 93005 ELECTROCARDIOGRAM TRACING: CPT | Performed by: EMERGENCY MEDICINE

## 2021-02-12 PROCEDURE — 84484 ASSAY OF TROPONIN QUANT: CPT

## 2021-02-12 PROCEDURE — 2580000003 HC RX 258: Performed by: INTERNAL MEDICINE

## 2021-02-12 PROCEDURE — 80053 COMPREHEN METABOLIC PANEL: CPT

## 2021-02-12 PROCEDURE — G0378 HOSPITAL OBSERVATION PER HR: HCPCS

## 2021-02-12 PROCEDURE — 99213 OFFICE O/P EST LOW 20 MIN: CPT | Performed by: NURSE PRACTITIONER

## 2021-02-12 PROCEDURE — 1200000000 HC SEMI PRIVATE

## 2021-02-12 PROCEDURE — 97116 GAIT TRAINING THERAPY: CPT

## 2021-02-12 PROCEDURE — 85610 PROTHROMBIN TIME: CPT

## 2021-02-12 PROCEDURE — 93010 ELECTROCARDIOGRAM REPORT: CPT | Performed by: INTERNAL MEDICINE

## 2021-02-12 PROCEDURE — 93971 EXTREMITY STUDY: CPT

## 2021-02-12 PROCEDURE — 70498 CT ANGIOGRAPHY NECK: CPT

## 2021-02-12 PROCEDURE — 6370000000 HC RX 637 (ALT 250 FOR IP): Performed by: INTERNAL MEDICINE

## 2021-02-12 PROCEDURE — 85025 COMPLETE CBC W/AUTO DIFF WBC: CPT

## 2021-02-12 PROCEDURE — G0379 DIRECT REFER HOSPITAL OBSERV: HCPCS

## 2021-02-12 PROCEDURE — 6370000000 HC RX 637 (ALT 250 FOR IP): Performed by: EMERGENCY MEDICINE

## 2021-02-12 PROCEDURE — 97162 PT EVAL MOD COMPLEX 30 MIN: CPT

## 2021-02-12 PROCEDURE — 71045 X-RAY EXAM CHEST 1 VIEW: CPT

## 2021-02-12 RX ORDER — PROMETHAZINE HYDROCHLORIDE 25 MG/1
12.5 TABLET ORAL EVERY 6 HOURS PRN
Status: DISCONTINUED | OUTPATIENT
Start: 2021-02-12 | End: 2021-02-13 | Stop reason: HOSPADM

## 2021-02-12 RX ORDER — POLYETHYLENE GLYCOL 3350 17 G/17G
17 POWDER, FOR SOLUTION ORAL DAILY PRN
Status: DISCONTINUED | OUTPATIENT
Start: 2021-02-12 | End: 2021-02-13 | Stop reason: HOSPADM

## 2021-02-12 RX ORDER — INSULIN GLARGINE 100 [IU]/ML
20 INJECTION, SOLUTION SUBCUTANEOUS 2 TIMES DAILY
Status: DISCONTINUED | OUTPATIENT
Start: 2021-02-12 | End: 2021-02-13 | Stop reason: HOSPADM

## 2021-02-12 RX ORDER — ATORVASTATIN CALCIUM 40 MG/1
40 TABLET, FILM COATED ORAL DAILY
Status: DISCONTINUED | OUTPATIENT
Start: 2021-02-13 | End: 2021-02-13 | Stop reason: HOSPADM

## 2021-02-12 RX ORDER — ASPIRIN 81 MG/1
324 TABLET, CHEWABLE ORAL ONCE
Status: COMPLETED | OUTPATIENT
Start: 2021-02-12 | End: 2021-02-12

## 2021-02-12 RX ORDER — DEXTROSE MONOHYDRATE 50 MG/ML
100 INJECTION, SOLUTION INTRAVENOUS PRN
Status: DISCONTINUED | OUTPATIENT
Start: 2021-02-12 | End: 2021-02-13 | Stop reason: HOSPADM

## 2021-02-12 RX ORDER — ZOLPIDEM TARTRATE 5 MG/1
10 TABLET ORAL NIGHTLY PRN
Status: DISCONTINUED | OUTPATIENT
Start: 2021-02-12 | End: 2021-02-13 | Stop reason: HOSPADM

## 2021-02-12 RX ORDER — DEXTROSE MONOHYDRATE 25 G/50ML
12.5 INJECTION, SOLUTION INTRAVENOUS PRN
Status: DISCONTINUED | OUTPATIENT
Start: 2021-02-12 | End: 2021-02-13 | Stop reason: HOSPADM

## 2021-02-12 RX ORDER — PANTOPRAZOLE SODIUM 40 MG/1
40 TABLET, DELAYED RELEASE ORAL DAILY
Status: DISCONTINUED | OUTPATIENT
Start: 2021-02-13 | End: 2021-02-13 | Stop reason: HOSPADM

## 2021-02-12 RX ORDER — ASPIRIN 300 MG/1
300 SUPPOSITORY RECTAL DAILY
Status: DISCONTINUED | OUTPATIENT
Start: 2021-02-12 | End: 2021-02-13 | Stop reason: HOSPADM

## 2021-02-12 RX ORDER — ONDANSETRON 2 MG/ML
4 INJECTION INTRAMUSCULAR; INTRAVENOUS EVERY 6 HOURS PRN
Status: DISCONTINUED | OUTPATIENT
Start: 2021-02-12 | End: 2021-02-13 | Stop reason: HOSPADM

## 2021-02-12 RX ORDER — SODIUM CHLORIDE 0.9 % (FLUSH) 0.9 %
10 SYRINGE (ML) INJECTION PRN
Status: DISCONTINUED | OUTPATIENT
Start: 2021-02-12 | End: 2021-02-13 | Stop reason: HOSPADM

## 2021-02-12 RX ORDER — SODIUM CHLORIDE 0.9 % (FLUSH) 0.9 %
10 SYRINGE (ML) INJECTION EVERY 12 HOURS SCHEDULED
Status: DISCONTINUED | OUTPATIENT
Start: 2021-02-12 | End: 2021-02-13 | Stop reason: HOSPADM

## 2021-02-12 RX ORDER — ASPIRIN 81 MG/1
81 TABLET ORAL DAILY
Status: DISCONTINUED | OUTPATIENT
Start: 2021-02-12 | End: 2021-02-13 | Stop reason: HOSPADM

## 2021-02-12 RX ORDER — NICOTINE POLACRILEX 4 MG
15 LOZENGE BUCCAL PRN
Status: DISCONTINUED | OUTPATIENT
Start: 2021-02-12 | End: 2021-02-13 | Stop reason: HOSPADM

## 2021-02-12 RX ADMIN — ASPIRIN 324 MG: 81 TABLET, CHEWABLE ORAL at 14:33

## 2021-02-12 RX ADMIN — PROMETHAZINE HYDROCHLORIDE 12.5 MG: 25 TABLET ORAL at 22:59

## 2021-02-12 RX ADMIN — IOPAMIDOL 75 ML: 755 INJECTION, SOLUTION INTRAVENOUS at 12:09

## 2021-02-12 RX ADMIN — SODIUM CHLORIDE, PRESERVATIVE FREE 10 ML: 5 INJECTION INTRAVENOUS at 21:26

## 2021-02-12 ASSESSMENT — PAIN SCALES - GENERAL: PAINLEVEL_OUTOF10: 0

## 2021-02-12 NOTE — H&P
Hospital Medicine History & Physical      PCP: Pako Greco DO    Date of Admission: 2/12/2021    Date of Service: Pt seen/examined on 2/12/2021 and Admitted to Inpatient with expected LOS greater than two midnights due to medical therapy. Chief Complaint:  Facial numbness    History Of Present Illness:   56 y.o. male who presented to [facility] with facial numbness. PMHx significant for diabetes, hypertension, prior DVT, irritable bowel syndrome, multiple abdominal surgeries including extensive hernia repair. He presented with a multitude of complaints. Initially reports severe abdominal pain over the last few weeks for which she has been following with his GI DrBang Cristobal; he reports recent negative CT scan and negative colonoscopy. He also reports 1 to 2 weeks of left lower extremity pain. He initially presented to his PCP on the day of admission with complaint of left lower extremity pain. He was concerned given his history of prior DVT. He was referred to the hospital for a left lower extremity ultrasound which was found to be negative. At the time of the ultrasound the patient started to have some sudden left sided facial numbness and tingling, from his forehead down into his mouth. He also noted some numbness in the left shoulder and left chest.  He also describes some pain in his chest and associated with some shortness of breath. It was then taken to [facility] emergency department for further evaluation. Most of the symptoms resolved although he had persistent numbness and tingling in his left face. The stroke team was consulted, however he was not considered a TPA candidate given his very minimal symptoms. He was transferred to [facility] for further evaluation and neurologic work-up.       Past Medical History:          Diagnosis Date    Chronic back pain     Chronic pain     Diverticulitis     DVT (deep venous thrombosis) (HCC)     Hyperlipidemia  Irritable bowel syndrome     Pain medication agreement signed     discontued care due eye surgery/imbilical hernia surgery -    Type II or unspecified type diabetes mellitus without mention of complication, not stated as uncontrolled     Umbilical hernia     having surgery 11/23       Past Surgical History:          Procedure Laterality Date    ABDOMEN SURGERY      ABDOMINAL ADHESION SURGERY      CATARACT REMOVAL WITH IMPLANT Left 11/13/15    COLON SURGERY      times 3    COLONOSCOPY      HERNIA REPAIR      OTHER SURGICAL HISTORY  9-26-12    EXCISION OF RIGHT THYROID NODULE          THROAT SURGERY Bilateral october 2013    TONSILLECTOMY      UPPER GASTROINTESTINAL ENDOSCOPY  02/10/2017    gastritis/duodenitis       Medications Prior to Admission:      Prior to Admission medications    Medication Sig Start Date End Date Taking? Authorizing Provider   atorvastatin (LIPITOR) 40 MG tablet Take 1 tablet by mouth every day 2/9/21  Yes Miguel Snow, DO   blood glucose test strips (ACCU-CHEK COMFORT CURVE) strip Accu-Chek guide me glucometer strips up to 3 times per day. 2/9/21  Yes Miguel Snow DO   pantoprazole (PROTONIX) 40 MG tablet TAKE 1 TABLET BY MOUTH DAILY 2/9/21  Yes Tawana Snow, DO   metFORMIN (GLUCOPHAGE) 1000 MG tablet 1 bid 2/9/21  Yes Miguel Snow DO   insulin aspart (NOVOLOG FLEXPEN) 100 UNIT/ML injection pen Inject up to 15 units subcutaneously twice a day per sliding scale 2/9/21  Yes Miguel Snow DO   insulin glargine (LANTUS SOLOSTAR) 100 UNIT/ML injection pen Inject 40 Units into the skin 2 times daily  Patient taking differently: Inject 40 Units into the skin 2 times daily Pt states he takes 20 Units in AM and 25 Units in PM 2/9/21  Yes AMADA Babcock - CNP   blood glucose test strips (ACCU-CHEK GUIDE) strip 1 each by In Vitro route daily As needed.    Yes Historical Provider, MD zolpidem (AMBIEN) 10 MG tablet Take 1 tablet by mouth nightly as needed for Sleep for up to 30 days. 2/1/21 3/3/21 Yes Miguel Snow DO   Insulin Pen Needle (B-D UF III MINI PEN NEEDLES) 31G X 5 MM MISC Use up to 3 times a day 1/17/20  Yes Miguel Snow DO   dicyclomine (BENTYL) 10 MG capsule  9/28/17  Yes Historical Provider, MD   aspirin 81 MG chewable tablet Take 81 mg by mouth daily   Yes Historical Provider, MD   Probiotic Product (PROBIOTIC-10 ULTIMATE PO) Take by mouth    Historical Provider, MD       Allergies:  Codeine, Dilaudid [hydromorphone hcl], Fentanyl, and Toradol [ketorolac tromethamine]    Social History:    TOBACCO:   reports that he quit smoking about 41 years ago. He has a 3.00 pack-year smoking history. He has never used smokeless tobacco.  ETOH:   reports no history of alcohol use. E-Cigarettes/Vaping Use     Questions Responses    E-Cigarette/Vaping Use Never User    Start Date     Passive Exposure     Quit Date     Counseling Given     Comments             Family History:          Problem Relation Age of Onset    Cancer Mother     Heart Disease Father     Cancer Other     Heart Disease Other        REVIEW OF SYSTEMS:   Pertinent positives as noted in the HPI. All other systems reviewed and negative. Physical Exam Performed:    BP (!) 153/91   Pulse 72   Temp 98 °F (36.7 °C) (Oral)   Resp 16   Ht 6' (1.829 m)   Wt 212 lb (96.2 kg)   SpO2 96%   BMI 28.75 kg/m²     General appearance: No apparent distress, appears stated age and cooperative. HEENT:  Normal cephalic, atraumatic without obvious deformity. Pupils equal, round, and reactive to light. Extra ocular muscles intact. Conjunctivae/corneas clear. Neck: Supple, no jugular venous distention. Trachea midline with full range of motion. Respiratory:  Normal respiratory effort. Clear to auscultation, bilaterally without Rales/Wheezes/Rhonchi. Cardiovascular: Regular rate and rhythm with normal S1/S2 without murmurs, rubs or gallops. Abdomen: Soft, non-tender, non-distended with normal bowel sounds. Musculoskelatal: No clubbing, cyanosis or edema bilaterally. Full range of motion without deformity. Neurologic:  Neurovascularly intact without any focal sensory/motor deficits. Cranial nerves: II-XII intact, grossly non-focal.  Psychiatric: Alert and oriented, thought content appropriate, normal insight  Skin: Skin color, texture, turgor normal.  No rashes or lesions. Capillary Refill: Brisk,< 3 seconds   Peripheral Pulses: +2 palpable, equal bilaterally       Labs:     Recent Labs     02/12/21  1155   WBC 6.9   HGB 14.2   HCT 42.0        Recent Labs     02/12/21  1155      K 4.8      CO2 26   BUN 23*   CREATININE 1.2   CALCIUM 9.7     Recent Labs     02/12/21  1155   AST 19   ALT 18   BILITOT 0.5   ALKPHOS 72     Recent Labs     02/12/21  1155   INR 1.07     Recent Labs     02/12/21  1155   TROPONINI <0.01       Radiology:     CXR: I have reviewed the CXR with the following interpretation: Clear  EKG:  I have reviewed the EKG with the following interpretation: Normal sinus rhythm with right bundle branch block, no acute ischemic changes noted. Ct Head Wo Contrast    Addendum Date: 2/12/2021    ADDENDUM: 3D surface reformatted and/or curved MIP images were performed on an independent workstation and submitted for review. Findings were discussed with Dexter Johnson at 12:26 pm on 2/12/2021.      Result Date: 2/12/2021 orbits demonstrate no acute abnormality. SINUSES:  The visualized paranasal sinuses and mastoid air cells demonstrate no acute abnormality. SOFT TISSUES/SKULL: No acute abnormality of the visualized skull or soft tissues. CTA NECK: AORTIC ARCH/ARCH VESSELS: No dissection or arterial injury. No significant stenosis of the brachiocephalic or subclavian arteries. CAROTID ARTERIES: No dissection, arterial injury, or hemodynamically significant stenosis by NASCET criteria. VERTEBRAL ARTERIES: No dissection, arterial injury, or significant stenosis. SOFT TISSUES: The lung apices are clear. No cervical or superior mediastinal lymphadenopathy. The larynx and pharynx are unremarkable. No acute abnormality of the salivary and thyroid glands. BONES: No acute osseous abnormality. CTA HEAD: ANTERIOR CIRCULATION: No significant stenosis of the intracranial internal carotid, anterior cerebral, or middle cerebral arteries. No aneurysm. POSTERIOR CIRCULATION: No significant stenosis of the vertebral, basilar, or posterior cerebral arteries. No aneurysm. OTHER: No dural venous sinus thrombosis on this non-dedicated study     1. No acute intracranial abnormality. 2. Unremarkable CTA of the head and neck.      Vl Extremity Venous Left    Result Date: 2/12/2021 orbits demonstrate no acute abnormality. SINUSES:  The visualized paranasal sinuses and mastoid air cells demonstrate no acute abnormality. SOFT TISSUES/SKULL: No acute abnormality of the visualized skull or soft tissues. CTA NECK: AORTIC ARCH/ARCH VESSELS: No dissection or arterial injury. No significant stenosis of the brachiocephalic or subclavian arteries. CAROTID ARTERIES: No dissection, arterial injury, or hemodynamically significant stenosis by NASCET criteria. VERTEBRAL ARTERIES: No dissection, arterial injury, or significant stenosis. SOFT TISSUES: The lung apices are clear. No cervical or superior mediastinal lymphadenopathy. The larynx and pharynx are unremarkable. No acute abnormality of the salivary and thyroid glands. BONES: No acute osseous abnormality. CTA HEAD: ANTERIOR CIRCULATION: No significant stenosis of the intracranial internal carotid, anterior cerebral, or middle cerebral arteries. No aneurysm. POSTERIOR CIRCULATION: No significant stenosis of the vertebral, basilar, or posterior cerebral arteries. No aneurysm. OTHER: No dural venous sinus thrombosis on this non-dedicated study     1. No acute intracranial abnormality. 2. Unremarkable CTA of the head and neck. ASSESSMENT:    Principal Problem:    Cerebrovascular accident (CVA) ([address]  Active Problems:    Type 2 diabetes mellitus without complication ([address]    Left leg pain    Abdominal pain  Resolved Problems:    * No resolved hospital problems.  *      PLAN: Possible TIA/CVA: Somewhat atypical presentation with numbness and tingling of the left face and left upper arm. Symptoms have mostly resolved except for some numbness down into his lower face. Head CT as well as CTA head and neck are unremarkable. Recommend brain MRI to rule out ischemia. Also recommend echocardiogram if time permits over the weekend during his hospitalization. Continue telemetry monitoring. Continue aspirin and statin. Continue blood pressure control. Diabetes Mellitus, Type 2: Controlled. Continue basal-bolus Insulin regimen. Monitor blood sugar and adjust regimen as needed. Diabetic (Carb-controlled) diet. Left leg pain: Etiology unclear. Left lower extremity Doppler is negative for DVT. Abdominal pain: Etiology unclear. Has had recent extensive work-up with his GI Dr. Sara Lorenzo, including a negative CT scan and colonoscopy. No high risk features. Defer to outpatient management.         DVT Prophylaxis: Lovenox  Diet: DIET GENERAL; Carb Control: 4 carb choices (60 gms)/meal  Code Status: Full Code    PT/OT Eval Status: Pending clinical course, but do not anticipate any therapy needs at this time    Dispo -1 to 2 days     [place] Aguilar MD

## 2021-02-12 NOTE — PROGRESS NOTES
Patient admitted to room 0541 from [place]. Patient oriented to room, call light, bed rails, phone, lights and bathroom. Patient instructed about the schedule of the day including: vital sign frequency, lab draws, possible tests, frequency of MD and staff rounds, including RN/MD rounding together at bedside, daily weights, and I &O's. Patient instructed about prescribed diet, how to use 8MENU, and television. bed alarm in place, patient aware of placement and reason. Telemetry box in place, patient aware of placement and reason. Bed locked, in lowest position, side rails up 2/4, call light within reach. Will continue to monitor.

## 2021-02-12 NOTE — PROGRESS NOTES
has a past surgical history that includes Colon surgery; Abdominal adhesion surgery; Tonsillectomy; Colonoscopy; other surgical history (9-26-12); Throat surgery (Bilateral, october 2013); Cataract removal with implant (Left, 11/13/15); hernia repair; Upper gastrointestinal endoscopy (02/10/2017); and Abdomen surgery.     Restrictions  Restrictions/Precautions  Restrictions/Precautions: General Precautions, Up as Tolerated  Position Activity Restriction  Other position/activity restrictions: Telemetry  Vision/Hearing  Vision: Impaired  Vision Exceptions: Wears glasses for reading  Hearing: Within functional limits     Subjective  General  Chart Reviewed: Yes  Patient assessed for rehabilitation services?: Yes  Additional Pertinent Hx: Hx: C3/C4 injury with mild cord compression, multiple abdominal sx; LLE doppler negative for DVT, CT and CTA head negative, MRI pending  Response To Previous Treatment: Not applicable  Family / Caregiver Present: No  Referring Practitioner: Leanne Magallanes MD  Referral Date : 02/12/21  Diagnosis: LLE pain, L facial numbness, chest pain concerning for CVA  Follows Commands: Within Functional Limits  General Comment  Comments: RN cleared pt for session  Subjective  Subjective: Pt resting in bed on approach, pleasant and agreeable to PT evaluation, reports most of symptoms have resolved, reports still mild numbness to L face  Pain Screening  Patient Currently in Pain: Denies  Vital Signs  Pulse: 69  BP: (!) 146/88  BP Location: Right upper arm  Patient Position: Sitting  Patient Currently in Pain: Denies  Oxygen Therapy  SpO2: 97 %  Pulse Oximeter Device Mode: Intermittent  O2 Device: None (Room air)       Orientation  Orientation  Overall Orientation Status: Within Normal Limits  Social/Functional History  Social/Functional History  Lives With: Spouse  Type of Home: House  Home Layout: Two level, Bed/Bath upstairs  Home Access: Stairs to enter without rails Times per week: D/C  Current Treatment Recommendations: Gait Training, Stair training, Balance Training, Endurance Training  Safety Devices  Type of devices:  All fall risk precautions in place, Call light within reach, Nurse notified(pt seated EOB)      AM-PAC Score  AM-PAC Inpatient Mobility Raw Score : 24 (02/12/21 1833)  AM-PAC Inpatient T-Scale Score : 61.14 (02/12/21 1833)  Mobility Inpatient CMS 0-100% Score: 0 (02/12/21 1833)  Mobility Inpatient CMS G-Code Modifier : [facility] (02/12/21 1833)          Goals  Short term goals  Time Frame for Short term goals: 1 visit 2/12/21  Short term goal 1: Pt will demonstrate ambulation >150' without AD with I -- GOAL ' no AD I  Patient Goals   Patient goals : \"go home\"       Therapy Time   Individual Concurrent Group Co-treatment   Time In 1723         Time Out 1747         Minutes 24         Timed Code Treatment Minutes: 14 Minutes(10 minutes for eval)       Jose L Dueñas, PT, DPT

## 2021-02-12 NOTE — ED PROVIDER NOTES
[address] ED    CHIEF COMPLAINT  Numbness (pt was here for doppler to r/o DVT in LLE and pt started s/o numbness and tingling in left side of his face and left arm that started at 1030 this am , Dr. Ap Murray at bedside for eval ) and Chest Pain (pt started having chest pain right after the numbness started accompanied with sob , no blood thinners,  in triage at this time )       [facility] Donovan Levy is a 56 y.o. male who presents to the ED with complaint of chest pain and numbness of the left side of the face and left upper extremity. Numbness started first. Pins and needles of the left upper extremity and the face that started at 1030 am, just after he had a DVT scan performed on the LLE. Chest pain started shortly thereafter. Occasional mild \"ping\" under the left chest. Not presently symptomatic. No tearing sensation. No radiation. No exacerbating or ameliorating factors. Complains of \"heavy breathing\". Denies nausea, vomiting. Chronic loose stool for which he follows with GI. Denies dysuria, hematuria. Denies anticoagulation. No other complaints, modifying factors or associated symptoms.      I have reviewed the following from the nursing documentation:    Past Medical History:   Diagnosis Date    Chronic back pain     Chronic pain     Diverticulitis     DVT (deep venous thrombosis) (HCC)     Hyperlipidemia     Irritable bowel syndrome     Pain medication agreement signed     discontued care due eye surgery/imbilical hernia surgery -    Type II or unspecified type diabetes mellitus without mention of complication, not stated as uncontrolled     Umbilical hernia     having surgery 11/23     Past Surgical History:   Procedure Laterality Date    ABDOMEN SURGERY      ABDOMINAL ADHESION SURGERY      CATARACT REMOVAL WITH IMPLANT Left 11/13/15    COLON SURGERY      times 3    COLONOSCOPY      HERNIA REPAIR      OTHER SURGICAL HISTORY  9-26-12 EXCISION OF RIGHT THYROID NODULE          THROAT SURGERY Bilateral 2013    TONSILLECTOMY      UPPER GASTROINTESTINAL ENDOSCOPY  02/10/2017    gastritis/duodenitis     Family History   Problem Relation Age of Onset    Cancer Mother     Heart Disease Father     Cancer Other     Heart Disease Other      Social History     Socioeconomic History    Marital status:      Spouse name: Not on file    Number of children: Not on file    Years of education: Not on file    Highest education level: Not on file   Occupational History    Occupation: Construction   Social Needs    Financial resource strain: Not on file    Food insecurity     Worry: Not on file     Inability: Not on file   [employer] needs     Medical: Not on file     Non-medical: Not on file   Tobacco Use    Smoking status: Former Smoker     Packs/day: 1.00     Years: 3.00     Pack years: 3.00     Quit date: 1980     Years since quittin.1    Smokeless tobacco: Never Used   Substance and Sexual Activity    Alcohol use: No     Alcohol/week: 0.0 standard drinks    Drug use: No    Sexual activity: Yes   Lifestyle    Physical activity     Days per week: Not on file     Minutes per session: Not on file    Stress: Not on file   Relationships    Social connections     Talks on phone: Not on file     Gets together: Not on file     Attends Evangelical service: Not on file     Active member of club or organization: Not on file     Attends meetings of clubs or organizations: Not on file     Relationship status: Not on file    Intimate partner violence     Fear of current or ex partner: Not on file     Emotionally abused: Not on file     Physically abused: Not on file     Forced sexual activity: Not on file   Other Topics Concern    Not on file   Social History Narrative    Not on file     Current Facility-Administered Medications   Medication Dose Route Frequency Provider Last Rate Last Admin  aspirin chewable tablet 324 mg  324 mg Oral Once Lindsey Maloney MD         Current Outpatient Medications   Medication Sig Dispense Refill    atorvastatin (LIPITOR) 40 MG tablet Take 1 tablet by mouth every day 90 tablet 3    blood glucose test strips (ACCU-CHEK COMFORT CURVE) strip Accu-Chek guide me glucometer strips up to 3 times per day. 300 each 3    pantoprazole (PROTONIX) 40 MG tablet TAKE 1 TABLET BY MOUTH DAILY 90 tablet 3    metFORMIN (GLUCOPHAGE) 1000 MG tablet 1 bid 180 tablet 0    insulin aspart (NOVOLOG FLEXPEN) 100 UNIT/ML injection pen Inject up to 15 units subcutaneously twice a day per sliding scale 30 mL 5    insulin glargine (LANTUS SOLOSTAR) 100 UNIT/ML injection pen Inject 40 Units into the skin 2 times daily 15 pen 1    blood glucose test strips (ACCU-CHEK GUIDE) strip 1 each by In Vitro route daily As needed.  zolpidem (AMBIEN) 10 MG tablet Take 1 tablet by mouth nightly as needed for Sleep for up to 30 days. 30 tablet 1    Insulin Pen Needle (B-D UF III MINI PEN NEEDLES) 31G X 5 MM MISC Use up to 3 times a day 100 each 3    Probiotic Product (PROBIOTIC-10 ULTIMATE PO) Take by mouth      dicyclomine (BENTYL) 10 MG capsule       aspirin 81 MG chewable tablet Take 81 mg by mouth daily       Allergies   Allergen Reactions    Codeine Hives and Nausea Only     25 years ago-unsure if allergy stated    Dilaudid [Hydromorphone Hcl]     Fentanyl      Patch caused skin irritation. IV does not bother pt has had fentanyl IV before.  Toradol [Ketorolac Tromethamine]        REVIEW OF SYSTEMS  10 systems reviewed, pertinent positives and negatives per HPI, otherwise noted to be negative.     PHYSICAL EXAM  ED Triage Vitals [02/12/21 1157]   Enc Vitals Group      BP (!) 149/82      Pulse 82      Resp 16      Temp 97.9 °F (36.6 °C)      Temp Source Oral      SpO2 99 %      Weight       Height       Head Circumference       Peak Flow       Pain Score       Pain Loc       Pain Edu? Excl. in [address]? General appearance: Awake and alert. Cooperative. No acute distress. HENT: Normocephalic. Atraumatic. Mucous membranes are moist.  Neck: Supple. Eyes: PERRL. EOMI. Heart/Chest: RRR. No murmurs. Lungs: Respirations unlabored. CTAB. Good air exchange. Speaking comfortably in full sentences. Abdomen: Soft. Non-tender. Non-distended. No rebound or guarding. Musculoskeletal: No extremity edema. No deformity. No tenderness in the extremities. All extremities neurovascularly intact. Skin: Warm and dry. No acute rashes. Neurological: NIH Stroke Scale  NIH Stroke Scale Assessed: Yes  Interval: Reassessment  Level of Consciousness (1a. ): Alert  LOC Questions (1b. ): Answers both correctly  LOC Commands (1c. ): Performs both tasks correctly  Best Gaze (2. ): Normal  Visual (3. ): No visual loss  Facial Palsy (4. ): Normal symmetrical movement  Motor Arm, Left (5a. ): No drift  Motor Arm, Right (5b. ): No drift  Motor Leg, Left (6a. ): No drift  Motor Leg, Right (6b. ): No drift  Limb Ataxia (7. ): Absent  Sensory (8. ): (!) Mild to Moderate(reports tingling to left side of face and neck)  Best Language (9. ): No aphasia  Dysarthria (10. ): Normal  Extinction and Inattention (11): No abnormality  Total: 1  Psychiatric: Mood/affect: normal      LABS  I have reviewed all labs for this visit.    Results for orders placed or performed during the hospital encounter of 02/12/21   CBC Auto Differential   Result Value Ref Range    WBC 6.9 4.0 - 11.0 K/uL    RBC 4.78 4.20 - 5.90 M/uL    Hemoglobin 14.2 13.5 - 17.5 g/dL    Hematocrit 42.0 40.5 - 52.5 %    MCV 87.9 80.0 - 100.0 fL    MCH 29.6 26.0 - 34.0 pg    MCHC 33.7 31.0 - 36.0 g/dL    RDW 13.6 12.4 - 15.4 %    Platelets 246 124 - 357 K/uL    MPV 8.7 5.0 - 10.5 fL    Neutrophils % 57.4 %    Lymphocytes % 33.6 %    Monocytes % 8.1 %    Eosinophils % 0.4 %    Basophils % 0.5 %    Neutrophils Absolute 3.9 1.7 - 7.7 K/uL

## 2021-02-12 NOTE — TELEPHONE ENCOUNTER
Reason for Disposition   Thigh or calf pain in only one leg and present > 1 hour    Answer Assessment - Initial Assessment Questions  1. ONSET: \"When did the pain start? \"       Yesterday afternoon patient woke up from a nap with left calf calf pain. 2. LOCATION: \"Where is the pain located? \"       Left calf, intermittent left hip pain, foot feels warm. 3. PAIN: \"How bad is the pain? \"    (Scale 1-10; or mild, moderate, severe)    -  MILD (1-3): doesn't interfere with normal activities     -  MODERATE (4-7): interferes with normal activities (e.g., work or school) or awakens from sleep, limping     -  SEVERE (8-10): excruciating pain, unable to do any normal activities, unable to walk      Pain is currently 4/10    4. WORK OR EXERCISE: \"Has there been any recent work or exercise that involved this part of the body? \"       Denies. 5. CAUSE: \"What do you think is causing the leg pain? \"      \"Blood clot. \"    6. OTHER SYMPTOMS: \"Do you have any other symptoms? \" (e.g., chest pain, back pain, breathing difficulty, swelling, rash, fever, numbness, weakness)      Denies all. 7. PREGNANCY: \"Is there any chance you are pregnant? \" \"When was your last menstrual period? \"      N/A    Protocols used: LEG PAIN-ADULT-[place]    Patient called at [facility])  with red flag complaint. Brief description of triage: Calf pain since yesterday when waking up from a nap. Hx of DVT, states he believes that is what is going on. Triage indicates for patient to go to [address] [place] [address] (or to office with PCP approval)    It is before 8am, so no NP or PCP is available for second level triage. Patient is declining to go to ED or UCC because he believes it is a waste of time and he does not want a large bill. Patient states he only wants to speak to his PCP so he can get an ultrasound ordered. Advised patient that if he is refusing my disposition, he at least needs to see his PCP today, as soon as possible.  Patient verbalizes understanding. Care advice provided, patient verbalizes understanding; denies any other questions or concerns; instructed to call back for any new or worsening symptoms. Writer provided warm transfer to Ana M Mix at [facility] for appointment scheduling. Attention Provider: Thank you for allowing me to participate in the care of your patient. The patient was connected to triage in response to information provided to the ECC. Please do not respond through this encounter as the response is not directed to a shared pool.

## 2021-02-12 NOTE — PROGRESS NOTES
4 Eyes Skin Assessment     The patient is being assess for   Admission    I agree that 2 RN's have performed a thorough Head to Toe Skin Assessment on the patient. ALL assessment sites listed below have been assessed. Areas assessed by both nurses:   [x]   Head, Face, and Ears   [x]   Shoulders, Back, and Chest, Abdomen  [x]   Arms, Elbows, and Hands   [x]   Coccyx, Sacrum, and Ischium  [x]   Legs, Feet, and Heels        None    **SHARE this note so that the co-signing nurse is able to place an eSignature**    Co-signer eSignature: Electronically signed by Jesica Ledesma RN on 2/12/21 at 5:44 PM EST    Does the Patient have Skin Breakdown?   No          Mike Prevention initiated:  No   Wound Care Orders initiated:  No      [facility] nurse consulted for Pressure Injury (Stage 3,4, Unstageable, DTI, NWPT, Complex wounds)and New or Established Ostomies:  No      Primary Nurse eSignature: Electronically signed by Jenni Diaz RN on 2/12/21 at 5:00 PM EST

## 2021-02-12 NOTE — ED NOTES
Provider reports there will be no administration of TPA. Patient laying in bed in stable condition at this time.      Jana England RN  02/12/21 2129

## 2021-02-12 NOTE — ED NOTES
Call placed to CT @ [phone]  Stroke team paged @ [address]  02/12/21 1150    Stroke team  Returned the call to Dr. Fernando Ventura @ [address] [place] [address]  02/12/21 1200

## 2021-02-13 ENCOUNTER — APPOINTMENT (OUTPATIENT)
Dept: MRI IMAGING | Age: 61
End: 2021-02-13
Attending: INTERNAL MEDICINE
Payer: COMMERCIAL

## 2021-02-13 VITALS
SYSTOLIC BLOOD PRESSURE: 138 MMHG | OXYGEN SATURATION: 95 % | HEART RATE: 85 BPM | BODY MASS INDEX: 28.71 KG/M2 | HEIGHT: 72 IN | TEMPERATURE: 97.4 F | WEIGHT: 212 LBS | RESPIRATION RATE: 16 BRPM | DIASTOLIC BLOOD PRESSURE: 84 MMHG

## 2021-02-13 PROBLEM — R20.0 LEFT FACIAL NUMBNESS: Status: ACTIVE | Noted: 2021-02-13

## 2021-02-13 LAB
CHOLESTEROL, TOTAL: 123 MG/DL (ref 0–199)
GLUCOSE BLD-MCNC: 203 MG/DL (ref 70–99)
GLUCOSE BLD-MCNC: 208 MG/DL (ref 70–99)
GLUCOSE BLD-MCNC: 87 MG/DL (ref 70–99)
HCT VFR BLD CALC: 42.2 % (ref 40.5–52.5)
HDLC SERPL-MCNC: 56 MG/DL (ref 40–60)
HEMOGLOBIN: 14.3 G/DL (ref 13.5–17.5)
LDL CHOLESTEROL CALCULATED: 53 MG/DL
MCH RBC QN AUTO: 29.6 PG (ref 26–34)
MCHC RBC AUTO-ENTMCNC: 33.8 G/DL (ref 31–36)
MCV RBC AUTO: 87.7 FL (ref 80–100)
PDW BLD-RTO: 13.5 % (ref 12.4–15.4)
PERFORMED ON: ABNORMAL
PERFORMED ON: ABNORMAL
PERFORMED ON: NORMAL
PLATELET # BLD: 191 K/UL (ref 135–450)
PMV BLD AUTO: 8.7 FL (ref 5–10.5)
RBC # BLD: 4.82 M/UL (ref 4.2–5.9)
TRIGL SERPL-MCNC: 71 MG/DL (ref 0–150)
VLDLC SERPL CALC-MCNC: 14 MG/DL
WBC # BLD: 6.5 K/UL (ref 4–11)

## 2021-02-13 PROCEDURE — 6360000002 HC RX W HCPCS: Performed by: INTERNAL MEDICINE

## 2021-02-13 PROCEDURE — 70551 MRI BRAIN STEM W/O DYE: CPT

## 2021-02-13 PROCEDURE — 83036 HEMOGLOBIN GLYCOSYLATED A1C: CPT

## 2021-02-13 PROCEDURE — 96372 THER/PROPH/DIAG INJ SC/IM: CPT

## 2021-02-13 PROCEDURE — G0378 HOSPITAL OBSERVATION PER HR: HCPCS

## 2021-02-13 PROCEDURE — 80061 LIPID PANEL: CPT

## 2021-02-13 PROCEDURE — 36415 COLL VENOUS BLD VENIPUNCTURE: CPT

## 2021-02-13 PROCEDURE — 6370000000 HC RX 637 (ALT 250 FOR IP): Performed by: INTERNAL MEDICINE

## 2021-02-13 PROCEDURE — 99218 PR INITIAL OBSERVATION CARE/DAY 30 MINUTES: CPT | Performed by: PSYCHIATRY & NEUROLOGY

## 2021-02-13 PROCEDURE — 6370000000 HC RX 637 (ALT 250 FOR IP): Performed by: NURSE PRACTITIONER

## 2021-02-13 PROCEDURE — 2580000003 HC RX 258: Performed by: INTERNAL MEDICINE

## 2021-02-13 PROCEDURE — 85027 COMPLETE CBC AUTOMATED: CPT

## 2021-02-13 RX ORDER — SENNA PLUS 8.6 MG/1
1 TABLET ORAL NIGHTLY
Status: DISCONTINUED | OUTPATIENT
Start: 2021-02-13 | End: 2021-02-13 | Stop reason: HOSPADM

## 2021-02-13 RX ORDER — SODIUM PHOSPHATE,MONO-DIBASIC 19G-7G/118
1 ENEMA (ML) RECTAL ONCE
Status: COMPLETED | OUTPATIENT
Start: 2021-02-13 | End: 2021-02-13

## 2021-02-13 RX ORDER — CALCIUM CARBONATE 200(500)MG
500 TABLET,CHEWABLE ORAL 3 TIMES DAILY PRN
Status: DISCONTINUED | OUTPATIENT
Start: 2021-02-13 | End: 2021-02-13 | Stop reason: HOSPADM

## 2021-02-13 RX ADMIN — INSULIN LISPRO 4 UNITS: 100 INJECTION, SOLUTION INTRAVENOUS; SUBCUTANEOUS at 12:53

## 2021-02-13 RX ADMIN — ASPIRIN 81 MG: 81 TABLET, COATED ORAL at 08:02

## 2021-02-13 RX ADMIN — INSULIN LISPRO 3 UNITS: 100 INJECTION, SOLUTION INTRAVENOUS; SUBCUTANEOUS at 17:15

## 2021-02-13 RX ADMIN — ANTACID TABLETS 500 MG: 500 TABLET, CHEWABLE ORAL at 13:57

## 2021-02-13 RX ADMIN — INSULIN GLARGINE 20 UNITS: 100 INJECTION, SOLUTION SUBCUTANEOUS at 12:54

## 2021-02-13 RX ADMIN — SODIUM PHOSPHATE 1 ENEMA: 7; 19 ENEMA RECTAL at 11:20

## 2021-02-13 RX ADMIN — ENOXAPARIN SODIUM 40 MG: 40 INJECTION SUBCUTANEOUS at 08:02

## 2021-02-13 RX ADMIN — ALUMINA, MAGNESIA, AND SIMETHICONE ORAL SUSPENSION REGULAR STRENGTH: 1200; 1200; 120 SUSPENSION ORAL at 15:58

## 2021-02-13 RX ADMIN — ATORVASTATIN CALCIUM 40 MG: 40 TABLET, FILM COATED ORAL at 08:02

## 2021-02-13 RX ADMIN — SODIUM CHLORIDE, PRESERVATIVE FREE 10 ML: 5 INJECTION INTRAVENOUS at 08:10

## 2021-02-13 RX ADMIN — PANTOPRAZOLE SODIUM 40 MG: 40 TABLET, DELAYED RELEASE ORAL at 08:02

## 2021-02-13 NOTE — PROGRESS NOTES
Occupational Therapy    OT referral received and appreciated. Pt's chart reviewed. Per PT, pt independent and has not acute OT needs. Will sign off at this time. Please re-consult if new needs arise.      Electronically signed by SHANA Ash/L on 2/13/2021 at 6:29 AM

## 2021-02-13 NOTE — PROGRESS NOTES
Perfect serve Gael Ryan: swallowing fine, canceling SLP eval.  dealing with severe constipation, takes Linzest, and senacot daily. Requesting fleets enema too. Thanks.

## 2021-02-13 NOTE — PROGRESS NOTES
Perfect serve Daryl Eric: still complaining of burning in his throat, Tums and Protonix haven't helped. Refusing milk and ice cream.  Is there something else we should try?  thanks :)

## 2021-02-13 NOTE — PROGRESS NOTES
Discharge order obtained. IV and TELE removed. [facility] notified. Discharge paperwork and prescriptions reviewed and signed with patient. Patient denies any questions. Belongings gathered and wheeled to vehicle with patient. Patient discharged.

## 2021-02-13 NOTE — PROGRESS NOTES
Speech Language Pathology  Hold Note    SLP order received and appreciated. Contacted RN this AM re: speech and swallow evaluation. RN requesting SLP hold evaluation, plans to discontinue order. Will continue to monitor order status.     Jono Parr, [address] [facility]  Speech-Language Pathologist  [address]. [phone]

## 2021-02-13 NOTE — PROGRESS NOTES
Perfect serve Rob Colbert: MRI coming in from [place], will scan patient between 3-330. patient agreeable to D/C after study.

## 2021-02-13 NOTE — CARE COORDINATION
CASE MANAGEMENT INITIAL ASSESSMENT      Reviewed chart and completed assessment via telephone with: Patient   Explained Case Management role/services. Primary contact information/Health Care Decision Maker :    Spouse  Flores [phone]      Can this person be reached and be able to respond quickly, such as within a few minutes or hours? Yes    Admit date/status: 2/12/21  Is this a Readmission?:  No      Insurance: [id number] required for SNF: Yes       3 night stay required: No    Living arrangements, Adls, care needs, prior to admission: Pt lives at home with his spouse and notes that he is independent and very active at home. Transportation: pt to arrange      Durable Medical Equipment at home: N/A Walker__Cane__RTS__ BSC__Shower Chair__  02__ HHN__ CPAP__  BiPap__  Hospital Bed__ W/C___ Other__________    Services in the home and/or outpatient, prior to admission: N/A    PT/OT recs: SW will follow     Hospital Exemption Notification (HEN): N/A    Barriers to discharge: N/A    Plan/comments:SW spoke with pt on this day and he notes that he plans to return home at discharge. Please notify SW should any needs arise.      ECOC on chart for MD signature

## 2021-02-13 NOTE — CONSULTS
 Hyperlipidemia     Irritable bowel syndrome     Pain medication agreement signed     discontued care due eye surgery/imbilical hernia surgery -    Type II or unspecified type diabetes mellitus without mention of complication, not stated as uncontrolled     Umbilical hernia     having surgery      Social History     Tobacco Use    Smoking status: Former Smoker     Packs/day: 1.00     Years: 3.00     Pack years: 3.00     Quit date: 1980     Years since quittin.1    Smokeless tobacco: Never Used   Substance Use Topics    Alcohol use: No     Alcohol/week: 0.0 standard drinks    Drug use: No     Allergies   Allergen Reactions    Codeine Hives and Nausea Only     25 years ago-unsure if allergy stated    Dilaudid [Hydromorphone Hcl]     Fentanyl      Patch caused skin irritation. IV does not bother pt has had fentanyl IV before.      Toradol [Ketorolac Tromethamine]      Current Facility-Administered Medications   Medication Dose Route Frequency Provider Last Rate Last Admin    senna (SENOKOT) tablet 8.6 mg  1 tablet Oral Nightly AMADA Manley - CNP        calcium carbonate (TUMS) chewable tablet 500 mg  500 mg Oral TID PRN AMADA Manley CNP   500 mg at 21 1357    aluminum & magnesium hydroxide-simethicone (MAALOX) 30 mL, lidocaine viscous hcl (XYLOCAINE) 5 mL (GI COCKTAIL)   Oral Once AMADA Manley CNP        atorvastatin (LIPITOR) tablet 40 mg  40 mg Oral Daily Lucio Jonas MD   40 mg at 21 0802    insulin glargine (LANTUS) injection vial 20 Units  20 Units Subcutaneous BID Lucio Jonas MD   Stopped at 21 0810    pantoprazole (PROTONIX) tablet 40 mg  40 mg Oral Daily Lucio Jonas MD   40 mg at 21 0802    zolpidem (AMBIEN) tablet 10 mg  10 mg Oral Nightly PRN Lucio Jonas MD        sodium chloride flush 0.9 % injection 10 mL  10 mL Intravenous 2 times per day Lucio Jonas MD   10 mL at 21 1357  sodium chloride flush 0.9 % injection 10 mL  10 mL Intravenous PRN Anita Saldaña MD        promethazine (PHENERGAN) tablet 12.5 mg  12.5 mg Oral Q6H PRN Anita Saldaña MD   12.5 mg at 02/12/21 2259    Or    ondansetron (ZOFRAN) injection 4 mg  4 mg Intravenous Q6H PRN Anita Saldaña MD        polyethylene glycol [facility]) packet 17 g  17 g Oral Daily PRN Anita Saldaña MD        enoxaparin (LOVENOX) injection 40 mg  40 mg Subcutaneous Daily Anita Saldaña MD   40 mg at 02/13/21 0802    aspirin EC tablet 81 mg  81 mg Oral Daily Anita Saldaña MD   81 mg at 02/1960    Or    aspirin suppository 300 mg  300 mg Rectal Daily Anita Saldaña MD        perflutren lipid microspheres (DEFINITY) injection 1.65 mg  1.5 mL Intravenous ONCE PRN Anita Saldaña MD        glucose (GLUTOSE) 40 % oral gel 15 g  15 g Oral PRN Anita Saldaña MD        dextrose 50 % IV solution  12.5 g Intravenous PRN Anita Saldaña MD        glucagon (rDNA) injection 1 mg  1 mg Intramuscular PRN Anita Saldaña MD        dextrose 5 % solution  100 mL/hr Intravenous PRN Anita Saldaña MD        insulin lispro (HUMALOG) injection vial 0-12 Units  0-12 Units Subcutaneous TID WC Anita Saldaña MD   Stopped at 02/13/21 0810    insulin lispro (HUMALOG) injection vial 0-6 Units  0-6 Units Subcutaneous Nightly Anita Saldaña MD           ROS : A 10-14 system review of constitutional, cardiovascular, respiratory, eyes, musculoskeletal, endocrine, GI, ENT, skin, hematological, genitourinary, psychiatric and neurologic systems was obtained and updated today and is unremarkable except as mentioned in my HPI      Exam:     Constitutional:   Vitals:    02/12/21 2245 02/13/21 0347 02/13/21 0743 02/13/21 1400   BP: 138/83 132/83 (!) 146/90 138/84   Pulse: 97 70 84 85   Resp: 16 16  16   Temp: 97.7 °F (36.5 °C) 97.7 °F (36.5 °C) 97.5 °F (36.4 °C) 97.4 °F (36.3 °C)   TempSrc: Oral  Oral Oral SpO2: 95% 92% 97% 95%   Weight:       Height:           General appearance:  Normal development and appear in no acute distress. Eye: No icterus. Fundus: Funduscopic examination cannot be performed due to COVID19 restrictions and precautions. Neck: supple  Cardiovascular: No lower leg edema with good pulsation. Mental Status:   Oriented to person, place, problem, and time. Memory: Aware of recent and remote event. Good immediate recall. Intact remote memory  Normal attention span and concentration. Language: intact naming, repeating and fluency   Good fund of Knowledge. Aware of current events and vocabulary   Cranial Nerves:   II: Visual fields: Full. Pupils: equal, round, reactive to light  III,IV,VI: Extra Ocular Movements are intact. No nystagmus  V: Facial sensation is intact   VII: Facial strength and movements: intact and symmetric  VIII: Hearing: Intact  IX: Palate elevation is symmetric  XI: Shoulder shrug is intact  XII: Tongue movements are normal  Musculoskeletal: 5/5 in all 4 extremities. Tone: Normal tone. Reflexes: 2+ in the upper extremity and 2+ in the lower extremity   Planters: flexor bilaterally. Coordination: no pronator drift, no dysmetria with FNF in upper extremities. Normal REM. Sensation: normal to all modalities in both arms and legs.   Gait/Posture: steady gait per patient    Data:  LABS:   Lab Results   Component Value Date     02/12/2021    K 4.8 02/12/2021     02/12/2021    CO2 26 02/12/2021    BUN 23 02/12/2021    CREATININE 1.2 02/12/2021    GFRAA >60 02/12/2021    GFRAA >60 09/20/2012    LABGLOM >60 02/12/2021    GLUCOSE 137 02/12/2021    PHOS 2.0 11/25/2015    MG 1.30 11/25/2015    CALCIUM 9.7 02/12/2021     Lab Results   Component Value Date    WBC 6.5 02/13/2021    RBC 4.82 02/13/2021    HGB 14.3 02/13/2021    HCT 42.2 02/13/2021    MCV 87.7 02/13/2021    RDW 13.5 02/13/2021     02/13/2021     Lab Results   Component Value Date INR 1.07 02/12/2021    PROTIME 12.4 02/12/2021       Neuroimaging were independently reviewed by me and discussed results with the patient  Reviewed notes from different physicians  Reviewed lab and blood testing    Impression:  New onset left facial numbness. Nonspecific. Possible TIA/CVA  Hypertension, controlled  Diabetes, controlled  Hyperlipidemia      Recommendation:  MRI  Aspirin  Statin  Insulin sliding scale  Blood sugar control  Watch blood pressure at home  DVT and GI prophylaxis  Telemetry  Stroke prevention, risk of recurrence and secondary prevention were discussed today  Can be discharged from neurology when medically stable and MRI showing no acute stroke. No further recommendation. Thank you for referring such patient. If you have any questions regarding my consult note, please don't hesitate to call me. Jessa Ellis MD  [phone]    This dictation was generated by voice recognition computer software.  Although all attempts are made to edit the dictation for accuracy, there may be errors in the  transcription that are not intended

## 2021-02-14 LAB
ESTIMATED AVERAGE GLUCOSE: 119.8 MG/DL
HBA1C MFR BLD: 5.8 %

## 2021-02-15 ENCOUNTER — CARE COORDINATION (OUTPATIENT)
Dept: CASE MANAGEMENT | Age: 61
End: 2021-02-15

## 2021-02-15 ENCOUNTER — NURSE TRIAGE (OUTPATIENT)
Dept: OTHER | Facility: CLINIC | Age: 61
End: 2021-02-15

## 2021-02-15 ENCOUNTER — TELEPHONE (OUTPATIENT)
Dept: FAMILY MEDICINE CLINIC | Age: 61
End: 2021-02-15

## 2021-02-15 NOTE — TELEPHONE ENCOUNTER
Pt states that he was in the ED because he has some kind of \"spell\". He feels as though his breathing is getting worse and when he stands up he is dizzy. He would like a call back from [facility] or Dr. Edmundo Cox.

## 2021-02-15 NOTE — TELEPHONE ENCOUNTER
Patient called [facility])  with red flag complaint. Brief description of triage: Chest heaviness that began Saturday. Has been constant since Sunday and states worsening when he went out in the cold and did a little work. He states it feels as if it's his lungs and is requesting a message be sent to PCP to call in an inhaler. He states he did send a message to Dr. Kimberlyn Ames via my chart last night, but has not heard back. Triage indicates for patient to go to ER now. Caller declined ER, therefore per protocol, writer attempted to call office and no answer due to closure. Caller is alert and oriented, and did discuss that cardiac issues can change quickly despite his recent hospital admission. He still declines and writer offered to attempt a V.V. Caller stated he didn't want a v.v. and just wants an inhaler called in and then he disconnected from the call. Attention Provider: Thank you for allowing me to participate in the care of your patient. The patient was connected to triage in response to information provided to the ECC. Please do not respond through this encounter as the response is not directed to a shared pool. Reason for Disposition   Chest pain lasting longer than 5 minutes and ANY of the following:* Over 39years old* Over 27years old and at least one cardiac risk factor (e.g., diabetes, high blood pressure, high cholesterol, smoker, or strong family history of heart disease)* History of heart disease (i.e., angina, heart attack, heart failure, bypass surgery, takes nitroglycerin)* Pain is crushing, pressure-like, or heavy    Answer Assessment - Initial Assessment Questions  1. LOCATION: \"Where does it hurt? \"        Chest heaviness. 2. RADIATION: \"Does the pain go anywhere else? \" (e.g., into neck, jaw, arms, back)      No radiation of pain/heaviness. 3. ONSET: \"When did the chest pain begin? \" (Minutes, hours or days)       Began after getting out of the hospital   The chest heaviness began Saturday. Seen in the office on Friday and sent to the ER. He said he a lot of testing and d/c'ed Saturday and \"they couldn't find anything wrong\"     4. PATTERN \"Does the pain come and go, or has it been constant since it started? \"  \"Does it get worse with exertion? \"       Tickling at the end of his breaths with breathing. 5. DURATION: \"How long does it last\" (e.g., seconds, minutes, hours)     He states he had it part of the day on Saturday and is constant since . He feels like it's kind of a cold. He states when he went outside, the cold air made it worse. When he stands, his breathing feels \"aggravated. \"     6. SEVERITY: \"How bad is the pain? \"  (e.g., Scale 1-10; mild, moderate, or severe)     - MILD (1-3): doesn't interfere with normal activities      - MODERATE (4-7): interferes with normal activities or awakens from sleep     - SEVERE (8-10): excruciating pain, unable to do any normal activities        Did not rate     7. CARDIAC RISK FACTORS: \"Do you have any history of heart problems or risk factors for heart disease? \" (e.g., angina, prior heart attack; diabetes, high blood pressure, high cholesterol, smoker, or strong family history of heart disease)     Family history, dad  from heart related. CVA, HTN, DM, High cholesterol, chest pain       8. PULMONARY RISK FACTORS: \"Do you have any history of lung disease? \"  (e.g., blood clots in lung, asthma, emphysema, birth control pills)          9. CAUSE: \"What do you think is causing the chest pain? \"      Same thing last year, he had a URI cold and was given an inhaler which helped. 10. OTHER SYMPTOMS: \"Do you have any other symptoms? \" (e.g., dizziness, nausea, vomiting, sweating, fever, difficulty breathing, cough)       Throat feels \"aggravated\" he thinks it's from his intestines and is taking medication for this. 11. PREGNANCY: \"Is there any chance you are pregnant? \" \"When was your last

## 2021-02-15 NOTE — CARE COORDINATION
COVID-19 Monitoring Call: Attempted to reach patient via phone for initial post hospital transition call. VM left stating purpose of call along with my contact information requesting a return call.       Cici COXN, RN, [facility]  Care Transition Nurse   [phone] office  [phone] mobile

## 2021-02-16 ENCOUNTER — TELEPHONE (OUTPATIENT)
Dept: CARDIOLOGY CLINIC | Age: 61
End: 2021-02-16

## 2021-02-16 ENCOUNTER — CARE COORDINATION (OUTPATIENT)
Dept: CASE MANAGEMENT | Age: 61
End: 2021-02-16

## 2021-02-16 NOTE — CARE COORDINATION
COVID-19 Risk Monitoring:    Second and final attempt made to reach patient for initial post hospital transition call. VM left stating purpose of call along with my contact information requesting a return call.       Mag COXN, RN, [facility]  Care Transition Nurse   [phone] office  [phone] mobile

## 2021-02-16 NOTE — TELEPHONE ENCOUNTER
Called and spoke to the patient this afternoon. He was able to detail his testing and concerns clearly. He sounded a little tired and fatigued on the phone. He has faint and dizzy spells when he walks. Symptoms are very consistent with possible COVID. Suggested he get checked. Patient is going to go to get tested.
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
## 2021-02-16 NOTE — TELEPHONE ENCOUNTER
Patient called stating he was seen at [facility] for possible TIA. Patient's testing was negative and he was discharged. Patient states he is still not feeling well. Patient states dizziness when he stands, SOB, chest discomfort and arm tingling / weakness.  Patient's BP today was 108/77 P87

## 2021-02-18 ENCOUNTER — HOSPITAL ENCOUNTER (EMERGENCY)
Age: 61
Discharge: HOME | End: 2021-02-18
Attending: EMERGENCY MEDICINE
Payer: COMMERCIAL

## 2021-02-18 ENCOUNTER — APPOINTMENT (OUTPATIENT)
Dept: CT IMAGING | Age: 61
End: 2021-02-18
Payer: COMMERCIAL

## 2021-02-18 ENCOUNTER — APPOINTMENT (OUTPATIENT)
Dept: GENERAL RADIOLOGY | Age: 61
End: 2021-02-18
Payer: COMMERCIAL

## 2021-02-18 VITALS
RESPIRATION RATE: 21 BRPM | OXYGEN SATURATION: 99 % | WEIGHT: 212 LBS | HEIGHT: 72 IN | SYSTOLIC BLOOD PRESSURE: 121 MMHG | TEMPERATURE: 97.8 F | BODY MASS INDEX: 28.71 KG/M2 | HEART RATE: 61 BPM | DIASTOLIC BLOOD PRESSURE: 74 MMHG

## 2021-02-18 DIAGNOSIS — R07.9 CHEST PAIN, UNSPECIFIED TYPE: Primary | ICD-10-CM

## 2021-02-18 DIAGNOSIS — R06.02 SHORTNESS OF BREATH: ICD-10-CM

## 2021-02-18 LAB
A/G RATIO: 1.5 (ref 1.1–2.2)
ALBUMIN SERPL-MCNC: 4.7 G/DL (ref 3.4–5)
ALP BLD-CCNC: 66 U/L (ref 40–129)
ALT SERPL-CCNC: 19 U/L (ref 10–40)
ANION GAP SERPL CALCULATED.3IONS-SCNC: 12 MMOL/L (ref 3–16)
APTT: 30 SEC (ref 24.2–36.2)
AST SERPL-CCNC: 21 U/L (ref 15–37)
BASE EXCESS VENOUS: -2.1 MMOL/L (ref -3–3)
BASOPHILS ABSOLUTE: 0 K/UL (ref 0–0.2)
BASOPHILS RELATIVE PERCENT: 0.4 %
BILIRUB SERPL-MCNC: 0.8 MG/DL (ref 0–1)
BUN BLDV-MCNC: 29 MG/DL (ref 7–20)
CALCIUM SERPL-MCNC: 10.5 MG/DL (ref 8.3–10.6)
CARBOXYHEMOGLOBIN: 1.9 % (ref 0–1.5)
CHLORIDE BLD-SCNC: 101 MMOL/L (ref 99–110)
CO2: 26 MMOL/L (ref 21–32)
CREAT SERPL-MCNC: 1.1 MG/DL (ref 0.8–1.3)
EKG ATRIAL RATE: 65 BPM
EKG DIAGNOSIS: NORMAL
EKG P AXIS: 33 DEGREES
EKG P-R INTERVAL: 150 MS
EKG Q-T INTERVAL: 446 MS
EKG QRS DURATION: 156 MS
EKG QTC CALCULATION (BAZETT): 463 MS
EKG R AXIS: 25 DEGREES
EKG T AXIS: 16 DEGREES
EKG VENTRICULAR RATE: 65 BPM
EOSINOPHILS ABSOLUTE: 0 K/UL (ref 0–0.6)
EOSINOPHILS RELATIVE PERCENT: 0.5 %
GFR AFRICAN AMERICAN: >60
GFR NON-AFRICAN AMERICAN: >60
GLOBULIN: 3.2 G/DL
GLUCOSE BLD-MCNC: 85 MG/DL (ref 70–99)
HCO3 VENOUS: 22.6 MMOL/L (ref 23–29)
HCT VFR BLD CALC: 41.6 % (ref 40.5–52.5)
HEMOGLOBIN: 14.3 G/DL (ref 13.5–17.5)
INR BLD: 1.1 (ref 0.86–1.14)
LYMPHOCYTES ABSOLUTE: 2.8 K/UL (ref 1–5.1)
LYMPHOCYTES RELATIVE PERCENT: 35.1 %
MCH RBC QN AUTO: 29.8 PG (ref 26–34)
MCHC RBC AUTO-ENTMCNC: 34.4 G/DL (ref 31–36)
MCV RBC AUTO: 86.6 FL (ref 80–100)
METHEMOGLOBIN VENOUS: 0.7 %
MONOCYTES ABSOLUTE: 0.6 K/UL (ref 0–1.3)
MONOCYTES RELATIVE PERCENT: 7.9 %
NEUTROPHILS ABSOLUTE: 4.5 K/UL (ref 1.7–7.7)
NEUTROPHILS RELATIVE PERCENT: 56.1 %
O2 CONTENT, VEN: 14 VOL %
O2 SAT, VEN: 67 %
O2 THERAPY: ABNORMAL
PCO2, VEN: 38.9 MMHG (ref 40–50)
PDW BLD-RTO: 13.4 % (ref 12.4–15.4)
PH VENOUS: 7.38 (ref 7.35–7.45)
PLATELET # BLD: 208 K/UL (ref 135–450)
PMV BLD AUTO: 8.9 FL (ref 5–10.5)
PO2, VEN: 35 MMHG (ref 25–40)
POTASSIUM REFLEX MAGNESIUM: 4.3 MMOL/L (ref 3.5–5.1)
PRO-BNP: 53 PG/ML (ref 0–124)
PROTHROMBIN TIME: 12.8 SEC (ref 10–13.2)
RBC # BLD: 4.8 M/UL (ref 4.2–5.9)
SARS-COV-2, NAAT: NOT DETECTED
SODIUM BLD-SCNC: 139 MMOL/L (ref 136–145)
TCO2 CALC VENOUS: 24 MMOL/L
TOTAL PROTEIN: 7.9 G/DL (ref 6.4–8.2)
TROPONIN: <0.01 NG/ML
WBC # BLD: 8 K/UL (ref 4–11)

## 2021-02-18 PROCEDURE — 85730 THROMBOPLASTIN TIME PARTIAL: CPT

## 2021-02-18 PROCEDURE — 99284 EMERGENCY DEPT VISIT MOD MDM: CPT

## 2021-02-18 PROCEDURE — 93005 ELECTROCARDIOGRAM TRACING: CPT | Performed by: EMERGENCY MEDICINE

## 2021-02-18 PROCEDURE — 85025 COMPLETE CBC W/AUTO DIFF WBC: CPT

## 2021-02-18 PROCEDURE — 80053 COMPREHEN METABOLIC PANEL: CPT

## 2021-02-18 PROCEDURE — 6360000004 HC RX CONTRAST MEDICATION: Performed by: EMERGENCY MEDICINE

## 2021-02-18 PROCEDURE — 71045 X-RAY EXAM CHEST 1 VIEW: CPT

## 2021-02-18 PROCEDURE — 93010 ELECTROCARDIOGRAM REPORT: CPT | Performed by: INTERNAL MEDICINE

## 2021-02-18 PROCEDURE — 71260 CT THORAX DX C+: CPT

## 2021-02-18 PROCEDURE — 83880 ASSAY OF NATRIURETIC PEPTIDE: CPT

## 2021-02-18 PROCEDURE — 84484 ASSAY OF TROPONIN QUANT: CPT

## 2021-02-18 PROCEDURE — 82803 BLOOD GASES ANY COMBINATION: CPT

## 2021-02-18 PROCEDURE — 85610 PROTHROMBIN TIME: CPT

## 2021-02-18 RX ADMIN — IOPAMIDOL 75 ML: 755 INJECTION, SOLUTION INTRAVENOUS at 17:25

## 2021-02-18 NOTE — ED PROVIDER NOTES
[address] [facility]  ED  EMERGENCY DEPARTMENT ENCOUNTER      Pt Name: Nasir Landa  MRN: [id number]  [place] 1960  Date of evaluation: 2/18/2021  Provider: Gaurav Clemons MD    CHIEF COMPLAINT       Chief Complaint   Patient presents with    Chest Pain     pt discharged from hospital about a week ago. on Sunday he experienced SOB and again worsening yesterday with intermittent chest pain.  Shortness of Breath         HISTORY OF PRESENT ILLNESS   (Location/Symptom, Timing/Onset, Context/Setting, Quality, Duration, Modifying Factors, Severity)  Note limiting factors. Nasir Landa is a 56 y.o. male with past medical history of hypertension, hyperlipidemia, diabetes, stroke, coronary artery disease here today with chest pain and shortness of breath    Patient states for the past few days he has had shortness of breath mild cough. Notes runny nose nasal congestion. Some mild aching left-sided chest pain. No fevers or chills. No lower extremity swelling, redness or pain. Was recently admitted to the hospital with complaints of similar symptoms last week but at that time was having some left-sided paresthesias and had negative stroke work-up and was discharged home. Notes his pain currently in the left chest is moderate aching. No alleviating factors. No known COVID-19 exposures but is requesting a COVID-19 test    HPI    Nursing Notes were reviewed. REVIEW OF SYSTEMS    (2-9 systems for level 4, 10 or more for level 5)     Review of Systems    Please see HPI for pertinent positive and negative review of system findings. A full 10 system ROS was performed and otherwise negative.         PAST MEDICAL HISTORY     Past Medical History:   Diagnosis Date    Chronic back pain     Chronic pain     Diverticulitis     [facility] ([facility])     Hyperlipidemia     Irritable bowel syndrome     Pain medication agreement signed discontued care due eye surgery/imbilical hernia surgery -    Type II or unspecified type diabetes mellitus without mention of complication, not stated as uncontrolled     Umbilical hernia     having surgery 11/23         SURGICAL HISTORY       Past Surgical History:   Procedure Laterality Date    ABDOMEN SURGERY      ABDOMINAL ADHESION SURGERY      CATARACT REMOVAL WITH IMPLANT Left 11/13/15    COLON SURGERY      times 3    COLONOSCOPY      HERNIA REPAIR      OTHER SURGICAL HISTORY  9-26-12    EXCISION OF RIGHT THYROID NODULE          THROAT SURGERY Bilateral october 2013    TONSILLECTOMY      UPPER GASTROINTESTINAL ENDOSCOPY  02/10/2017    gastritis/duodenitis         CURRENT MEDICATIONS       Discharge Medication List as of 2/18/2021  6:22 PM      CONTINUE these medications which have NOT CHANGED    Details   atorvastatin (LIPITOR) 40 MG tablet Take 1 tablet by mouth every day, Disp-90 tablet, R-3Refill [id number]      !! blood glucose test strips (ACCU-CHEK COMFORT CURVE) strip Accu-Chek guide me glucometer strips up to 3 times per day., Disp-300 each, R-3Normal      pantoprazole (PROTONIX) 40 MG tablet TAKE 1 TABLET BY MOUTH DAILY, Disp-90 tablet, R-3Normal      metFORMIN (GLUCOPHAGE) 1000 MG tablet 1 bid, Disp-180 tablet, R-0Refill [id number]      insulin aspart (NOVOLOG FLEXPEN) 100 UNIT/ML injection pen Inject up to 15 units subcutaneously twice a day per sliding scale, Disp-30 mL, R-5Refill [id number]      insulin glargine (LANTUS SOLOSTAR) 100 UNIT/ML injection pen Inject 40 Units into the skin 2 times daily, Disp-15 pen, R-1Normal      !! blood glucose test strips (ACCU-CHEK GUIDE) strip 1 each by In Vitro route daily As needed. Historical Med      zolpidem (AMBIEN) 10 MG tablet Take 1 tablet by mouth nightly as needed for Sleep for up to 30 days. , Disp-30 tablet, R-1Normal Insulin Pen Needle (B-D UF III MINI PEN NEEDLES) 31G X 5 MM MISC Disp-100 each, R-3, NormalUse up to 3 times a day      Probiotic Product (PROBIOTIC-10 ULTIMATE PO) Take by mouthHistorical Med      dicyclomine (BENTYL) 10 MG capsule Historical Med      aspirin 81 MG chewable tablet Take 81 mg by mouth dailyHistorical Med       !! - Potential duplicate medications found. Please discuss with provider.           ALLERGIES     Codeine, Dilaudid [hydromorphone hcl], Fentanyl, and Toradol [ketorolac tromethamine]    FAMILY HISTORY       Family History   Problem Relation Age of Onset    Cancer Mother     Heart Disease Father     Cancer Other     Heart Disease Other           SOCIAL HISTORY       Social History     Socioeconomic History    Marital status:      Spouse name: None    Number of children: None    Years of education: None    Highest education level: None   Occupational History    Occupation: Construction   Social Needs    Financial resource strain: None    Food insecurity     Worry: None     Inability: None    Transportation needs     Medical: None     Non-medical: None   Tobacco Use    Smoking status: Former Smoker     Packs/day: 1.00     Years: 3.00     Pack years: 3.00     Quit date: 1980     Years since quittin.1    Smokeless tobacco: Never Used   Substance and Sexual Activity    Alcohol use: No     Alcohol/week: 0.0 standard drinks    Drug use: No    Sexual activity: Yes   Lifestyle    Physical activity     Days per week: None     Minutes per session: None    Stress: None   Relationships    Social connections     Talks on phone: None     Gets together: None     Attends Faith service: None     Active member of club or organization: None     Attends meetings of clubs or organizations: None     Relationship status: None    Intimate partner violence     Fear of current or ex partner: None     Emotionally abused: None     Physically abused: None Forced sexual activity: None   Other Topics Concern    None   Social History Narrative    None       SCREENINGS    [place] Coma Scale  Eye Opening: Spontaneous  Best Verbal Response: Oriented  Best Motor Response: Obeys commands  [place] Coma Scale Score: 15          PHYSICAL EXAM    (up to 7 for level 4, 8 or more for level 5)     ED Triage Vitals   BP Temp Temp Source Pulse Resp SpO2 Height Weight   02/18/21 1456 02/18/21 1456 02/18/21 1456 02/18/21 1456 02/18/21 1456 02/18/21 1523 02/18/21 1456 02/18/21 1456   136/87 97.8 °F (36.6 °C) Oral 78 14 94 % 6' (1.829 m) 212 lb (96.2 kg)       Physical Exam    General appearance:  Cooperative. No acute distress. Skin:  Warm. Dry. Eye:  Extraocular movements intact. Ears, nose, mouth and throat:  Oral mucosa moist,  Neck:  Trachea midline. Heart:  Regular rate and rhythm  Perfusion:  intact  Respiratory:  Lungs clear to auscultation bilaterally. Respirations nonlabored. Abdominal:   Non distended. Nontender  Neurological:  Alert and oriented x 3.   Moves all extremities spontaneously  Musculoskeletal:   Normal ROM, no deformities          Psychiatric:  Normal mood      DIAGNOSTIC RESULTS       Labs Reviewed   COMPREHENSIVE METABOLIC PANEL W/ REFLEX TO MG FOR LOW K - Abnormal; Notable for the following components:       Result Value    BUN 29 (*)     All other components within normal limits    Narrative:     Performed at:  [address], [facility] [employer]   Phone [phone]   BLOOD GAS, VENOUS - Abnormal; Notable for the following components:    pCO2, Edis 38.9 (*)     HCO3, Venous 22.6 (*)     Carboxyhemoglobin 1.9 (*)     All other components within normal limits    Narrative:     Performed at:  [address],  [place], [facility] [employer]   Phone [phone], RAPID    Narrative:     Performed at:  [address] [place] [address] Laboratory [address] [place] [address], [place] [employer]   Phone [phone]   CBC WITH AUTO DIFFERENTIAL    Narrative:     Performed at:  [address], [place] [employer]   Phone [phone]   TROPONIN    Narrative:     Performed at:  [address] [place] [employer]   Phone [phone] PEPTIDE    Narrative:     Performed at:  [address], [facility] [employer]   Phone [phone]   PROTIME-INR    Narrative:     Performed at:  [address], [place] [employer]   Phone [phone]   APTT    Narrative:     Performed at:  [address], [place] [employer]   Phone [phone]       Interpretation per the Radiologist below, if obtained/available at the time of this note:    CT CHEST PULMONARY EMBOLISM W CONTRAST   Final Result   1. No evidence of pulmonary embolic disease. 2. No acute pulmonary findings. Remote granulomatous disease. 3. Atherosclerotic calcification in the coronary circulation. XR CHEST PORTABLE   Final Result   No acute abnormality             All other labs/imaging were within normal range or not returned as of this dictation. EMERGENCY DEPARTMENT COURSE and DIFFERENTIAL DIAGNOSIS/MDM:   Vitals:    Vitals:    02/18/21 1554 02/18/21 1624 02/18/21 1653 02/18/21 1801   BP: 115/78 119/75 121/78 121/74   Pulse: 76 64 73 61   Resp: 22 20 23 21   Temp:       TempSrc:       SpO2: 94% 94% (!) 84% 99%   Weight:       Height:           EKG: Normal sinus rhythm with sinus arrhythmia rate of 65 bpm.  Right bundle branch block. No significant change from prior. Patient presented to the emergency department today with some chest pain cough shortness of breath. COVID-19 testing performed and negative. There was brief episode where. The patient was hypoxic but this resolved quickly and the patient had no oxygen requirement. CT angiogram of the chest was performed that shows no evidence of PE however the patient does have coronary artery calcifications. Remainder of work-up was unremarkable. Discussed admission with the patient for further cardiac work-up as in his recent hospitalization this was not addressed. He stated he did not want to be admitted. He would follow-up with his cardiologist, Dr. Thais Vasquez, early next week for reevaluation and possible outpatient stress testing. He was encouraged to return to the emergency department for any worsening chest pain or shortness of breath but at this time is had the symptoms for days and I do feel he can be discharged home    MDM    CONSULTS     None    Critical Care:   None    REASSESSMENT          PROCEDURE     Unless otherwise noted below, none     Procedures      FINAL IMPRESSION      1. Chest pain, unspecified type    2. Shortness of breath            DISPOSITION/PLAN   DISPOSITION Decision To Discharge 02/18/2021 06:07:59 PM        PATIENT REFERRED TO:  DO Oniel Sanders [address] [address]  [phone]    Schedule an appointment as soon as possible for a visit       Diana Colon MD  [address] [place] [address]  [phone]    Schedule an appointment as soon as possible for a visit         DISCHARGE MEDICATIONS:  Discharge Medication List as of 2/18/2021  6:22 PM        Controlled Substances Monitoring:     RX Monitoring 6/3/2019   Attestation The Prescription Monitoring Report was requested today but not available. Periodic Controlled Substance Monitoring Possible medication side effects, risk of tolerance/dependence & alternative treatments discussed. ;Obtaining appropriate analgesic effect of treatment. ;No signs of potential drug abuse or diversion identified: otherwise, see note documentation       (Please note that portions of this note were completed with a voice recognition program.  Efforts were made to edit the dictations but occasionally words are mis-transcribed.)    Wade Muse MD (electronically signed)  Attending Emergency Physician            Saunders Dance, MD  02/21/21 4660

## 2021-02-19 ENCOUNTER — TELEPHONE (OUTPATIENT)
Dept: CARDIOLOGY CLINIC | Age: 61
End: 2021-02-19

## 2021-02-19 RX ORDER — ALBUTEROL SULFATE 90 UG/1
2 AEROSOL, METERED RESPIRATORY (INHALATION) EVERY 4 HOURS PRN
Qty: 1 INHALER | Refills: 0 | Status: SHIPPED | OUTPATIENT
Start: 2021-02-19 | End: 2021-03-15

## 2021-02-19 NOTE — TELEPHONE ENCOUNTER
Made appt in [facility] for VSP Wednesday. Pt was told if he feels worse he needs to go to the ER.

## 2021-02-19 NOTE — TELEPHONE ENCOUNTER
Spoke with pt, he states he is not currently having cp, it comes and goes. CP is mild when it comes. SOB yesterday, when he sits still no sob. When he moves around he becomes sob. No headaches. Left arm feels weird, like weak. No edema. Please advise.

## 2021-02-22 NOTE — PROGRESS NOTES
 Mild non obstructive coronary artery disease involving native coronary artery of native heart without angina pectoris    Palpitation    Lower abdominal pain    Chronic gastritis without bleeding    Vertigo    Left leg pain    Primary osteoarthritis involving multiple joints    Anxiety    Cerebrovascular accident (CVA) (HCC)    Abdominal pain    Left facial numbness    Dyslipidemia         Cardiac Testing: I have reviewed the findings below. EKG:  ECHO:   STRESS TEST:  CATH:  BYPASS:  VASCULAR:    Past Medical History:   has a past medical history of Chronic back pain, Chronic pain, Diverticulitis, DVT (deep venous thrombosis) ([facility] [address] Hyperlipidemia, Irritable bowel syndrome, Pain medication agreement signed, Type II or unspecified type diabetes mellitus without mention of complication, not stated as uncontrolled, and Umbilical hernia. Surgical History:   has a past surgical history that includes Colon surgery; Abdominal adhesion surgery; Tonsillectomy; Colonoscopy; other surgical history (9-26-12); Throat surgery (Bilateral, october 2013); Cataract removal with implant (Left, 11/13/15); hernia repair; Upper gastrointestinal endoscopy (02/10/2017); and Abdomen surgery. Social History:   reports that he quit smoking about 41 years ago. He has a 3.00 pack-year smoking history. He has never used smokeless tobacco. He reports that he does not drink alcohol or use drugs. Family History:  No evidence for sudden cardiac death or premature CAD    Medications:  Reviewed and are listed in nursing record. and/or listed below  Current Outpatient Medications on File Prior to Visit   Medication Sig Dispense Refill    atorvastatin (LIPITOR) 40 MG tablet Take 1 tablet by mouth every day 90 tablet 3    blood glucose test strips (ACCU-CHEK COMFORT CURVE) strip Accu-Chek guide me glucometer strips up to 3 times per day.  300 each 3    metFORMIN (GLUCOPHAGE) 1000 MG tablet 1 bid 180 tablet 0  insulin aspart (NOVOLOG FLEXPEN) 100 UNIT/ML injection pen Inject up to 15 units subcutaneously twice a day per sliding scale 30 mL 5    insulin glargine (LANTUS SOLOSTAR) 100 UNIT/ML injection pen Inject 40 Units into the skin 2 times daily (Patient taking differently: Inject 40 Units into the skin 2 times daily Pt states he takes 20 Units in AM and 25 Units in PM) 15 pen 1    blood glucose test strips (ACCU-CHEK GUIDE) strip 1 each by In Vitro route daily As needed.  zolpidem (AMBIEN) 10 MG tablet Take 1 tablet by mouth nightly as needed for Sleep for up to 30 days. 30 tablet 1    Insulin Pen Needle (B-D UF III MINI PEN NEEDLES) 31G X 5 MM MISC Use up to 3 times a day 100 each 3    Probiotic Product (PROBIOTIC-10 ULTIMATE PO) Take by mouth      dicyclomine (BENTYL) 10 MG capsule       aspirin 81 MG chewable tablet Take 81 mg by mouth daily      albuterol sulfate HFA (PROAIR HFA) 108 (90 Base) MCG/ACT inhaler Inhale 2 puffs into the lungs every 4 hours as needed for Wheezing or Shortness of Breath (Patient not taking: Reported on 2/24/2021) 1 Inhaler 0    pantoprazole (PROTONIX) 40 MG tablet TAKE 1 TABLET BY MOUTH DAILY (Patient not taking: Reported on 2/24/2021) 90 tablet 3     No current facility-administered medications on file prior to visit. Allergies:  Codeine, Dilaudid [hydromorphone hcl], Fentanyl, and Toradol [ketorolac tromethamine]     Review of Systems:   Review of Systems:   All 14 point review of symptoms completed. Pertinent positives identified in the HPI, all other review of symptoms negative as below.     Review of Systems - History obtained from the patient  General ROS: negative for - chills, fever or night sweats  Psychological ROS: negative for - disorientation or hallucinations  Ophthalmic ROS: negative for - dry eyes, eye pain or loss of vision  ENT ROS: negative for - nasal discharge or sore throat  Allergy and Immunology ROS: negative for - hives or itchy/watery eyes Hematological and Lymphatic ROS: negative for - jaundice or night sweats  Endocrine ROS: negative for - mood swings or temperature intolerance  Breast ROS: deferred  Respiratory ROS: negative for - hemoptysis or stridor  Cardiovascular ROS: negative for - chest pain, dyspnea on exertion or palpitations  Gastrointestinal ROS: no abdominal pain, change in bowel habits, or black or bloody stools  Genito-Urinary ROS: no dysuria, trouble voiding, or hematuria  Musculoskeletal ROS: negative for - gait disturbance, joint pain or joint stiffness  Neurological ROS: negative for - seizures or speech problems  Dermatological ROS: negative for - rash or skin lesion changes      Physical Examination:    Vitals:    02/24/21 1224   BP: 130/68   Pulse: 90   SpO2: 92%    Weight: 203 lb (92.1 kg)     Wt Readings from Last 3 Encounters:   02/24/21 203 lb (92.1 kg)   02/18/21 212 lb (96.2 kg)   02/12/21 212 lb (96.2 kg)     No intake or output data in the 24 hours ending 02/24/21 1321    General Appearance:  Alert, cooperative, no distress, appears stated age   Head:  Normocephalic, without obvious abnormality, atraumatic   Eyes:  PERRL, conjunctiva/corneas clear       Nose: Nares normal, no drainage or sinus tenderness   Throat: Lips, mucosa, and tongue normal   Neck: Supple, symmetrical, trachea midline, no adenopathy, thyroid: not enlarged, symmetric, no tenderness/mass/nodules, no carotid bruit or JVD       Lungs:   Clear to auscultation bilaterally, respirations unlabored   Chest Wall:  No tenderness or deformity   Heart:  Regular rhythm and normal rate; S1, S2 are normal; no murmur noted; no rub or gallop   Abdomen:   Soft, non-tender, bowel sounds active all four quadrants,  no masses, no organomegaly           Extremities: Extremities normal, atraumatic, no cyanosis or edema   Pulses: 2+ and symmetric   Skin: Skin color, texture, turgor normal, no rashes or lesions   Pysch: Normal mood and affect Neurologic: Normal gross motor and sensory exam.         Labs  No results for input(s): WBC, HGB, HCT, MCV, PLT in the last 72 hours. No results for input(s): CREATININE, BUN, NA, K, CL, CO2 in the last 72 hours. No results for input(s): INR, PROTIME in the last 72 hours. No results for input(s): TROPONINI in the last 72 hours. Invalid input(s): PRO-BNP  No results for input(s): CHOL, HDL in the last 72 hours. Invalid input(s): LDL, TG        Assessment:  56 y.o. patient with:  1. Dizziness   ~when bending over   ~lost 30 pounds  2. Mild nonobstructive coronary artery disease with preserved left ventricular function   ~Abnormal stress test   ~Cath April 28, 2015   ~Stress test 5/2019 normal   3. Hypertension   ~BP: (130)/(68)    4. Obesity   ~Body mass index is 27.53 kg/m². 5. Abnormal EKG with RBBB  6. Hyperlipidemia   ~on lipitor    Plan:  1. No changes today   2. Will await GI work up. Do not feel symptoms are cardiac related  3. Call the office after GI work up     This note was scribed in the presence of Dr. Jeremy Weaver MD by Sylwia Barry RN.     I, Dr. Paual Gomes, personally performed the services described in this documentation, as scribed by the above signed scribe in my presence. It is both accurate and complete to my knowledge. I agree with the details independently gathered by the clinical support staff, while the remaining scribed note accurately describes my personal service to the patient. All questions and concerns were addressed to the patient/family. Alternatives to my treatment were discussed. The note was completed using EMR. Every effort was made to ensure accuracy; however, inadvertent computerized transcription errors may be present.     Paula Gomes MD, [id number], [facility], [address] [facility] office  [phone] [facility]  2/24/2021  1:21 PM

## 2021-02-24 ENCOUNTER — OFFICE VISIT (OUTPATIENT)
Dept: CARDIOLOGY CLINIC | Age: 61
End: 2021-02-24
Payer: COMMERCIAL

## 2021-02-24 VITALS
WEIGHT: 203 LBS | HEIGHT: 72 IN | SYSTOLIC BLOOD PRESSURE: 130 MMHG | HEART RATE: 90 BPM | DIASTOLIC BLOOD PRESSURE: 68 MMHG | BODY MASS INDEX: 27.5 KG/M2 | OXYGEN SATURATION: 92 %

## 2021-02-24 DIAGNOSIS — E78.2 MIXED HYPERLIPIDEMIA: ICD-10-CM

## 2021-02-24 DIAGNOSIS — I45.10 RBBB: ICD-10-CM

## 2021-02-24 DIAGNOSIS — I10 ESSENTIAL HYPERTENSION: ICD-10-CM

## 2021-02-24 DIAGNOSIS — R42 DIZZINESS: Primary | ICD-10-CM

## 2021-02-24 DIAGNOSIS — I25.10 CORONARY ARTERY DISEASE INVOLVING NATIVE CORONARY ARTERY OF NATIVE HEART WITHOUT ANGINA PECTORIS: ICD-10-CM

## 2021-02-24 PROCEDURE — 99213 OFFICE O/P EST LOW 20 MIN: CPT | Performed by: INTERNAL MEDICINE

## 2021-02-24 NOTE — PATIENT INSTRUCTIONS
Plan:  1. No changes today   2. Will await GI work up. Do not feel symptoms are cardiac related  3.  Call the office after GI work up

## 2021-02-24 NOTE — LETTER
[address] [facility]   Cardiovascular Evaluation    PATIENT: Nasir Landa  DATE: 2021  MRN: <[id number]>  CSN: [id number]  : 1960    Primary Care Doctor: Willi Palacios DO    Reason for evaluation:   Follow-up, Hypertension, Hyperlipidemia, Coronary Artery Disease, Shortness of Breath (comes and goes), Chest Pain, Edema (stomach, has had multiple stomach surgeries), and Fatigue (tired, covid test 2 weeks ago and it was neg)      Subjective:    History of present illness on initial date of evaluation:   Nasir Landa is a 56 y.o. patient who presents for follow up. He had a cardiac craterization 2015 which showed mild nonobstructive coronary artery disease. He had a normal stress test 2019. His carotid doppler study from 2020 was normal. He was admitted to the hospital 20 for left facial numbness. CT of head and CTA of head and neck were negative. MRI of the brain was unremarkable. He returned to the ED for evaluation 20 complaint of chest pain and shortness of breath. Chest xray showed no acute abnormality. CT chest showed no evidence for pulmonary emboli. Today he reports multiple GI surgeries in the past. About 4 weeks ago he reports abdominal pain and constipation which makes him feel off. He follows Rock Figueroa who recently started him on linzness. He has intermittent episodes of dizziness. He has dyspnea with exertion as well as other nonspecific symptoms.      Patient Active Problem List   Diagnosis    Chronic pain    Hypertension    Type 2 diabetes mellitus without complication (HCC)    Chronic back pain    Irritable bowel syndrome    Mixed hyperlipidemia    DJD (degenerative joint disease), lumbar    Actinic keratoses    Seborrheic keratosis    Hx of abdominal surgery    Umbilical hernia    Chest pain    Left arm pain    Lightheadedness    Sensory disturbance    Pain medication agreement signed  Mild non obstructive coronary artery disease involving native coronary artery of native heart without angina pectoris    Palpitation    Lower abdominal pain    Chronic gastritis without bleeding    Vertigo    Left leg pain    Primary osteoarthritis involving multiple joints    Anxiety    Cerebrovascular accident (CVA) (HCC)    Abdominal pain    Left facial numbness    Dyslipidemia         Cardiac Testing: I have reviewed the findings below. EKG:  ECHO:   STRESS TEST:  CATH:  BYPASS:  VASCULAR:    Past Medical History:   has a past medical history of Chronic back pain, Chronic pain, Diverticulitis, DVT (deep venous thrombosis) ([facility] [address] Hyperlipidemia, Irritable bowel syndrome, Pain medication agreement signed, Type II or unspecified type diabetes mellitus without mention of complication, not stated as uncontrolled, and Umbilical hernia. Surgical History:   has a past surgical history that includes Colon surgery; Abdominal adhesion surgery; Tonsillectomy; Colonoscopy; other surgical history (9-26-12); Throat surgery (Bilateral, october 2013); Cataract removal with implant (Left, 11/13/15); hernia repair; Upper gastrointestinal endoscopy (02/10/2017); and Abdomen surgery. Social History:   reports that he quit smoking about 41 years ago. He has a 3.00 pack-year smoking history. He has never used smokeless tobacco. He reports that he does not drink alcohol or use drugs. Family History:  No evidence for sudden cardiac death or premature CAD    Medications:  Reviewed and are listed in nursing record. and/or listed below  Current Outpatient Medications on File Prior to Visit   Medication Sig Dispense Refill    atorvastatin (LIPITOR) 40 MG tablet Take 1 tablet by mouth every day 90 tablet 3    blood glucose test strips (ACCU-CHEK COMFORT CURVE) strip Accu-Chek guide me glucometer strips up to 3 times per day.  300 each 3    metFORMIN (GLUCOPHAGE) 1000 MG tablet 1 bid 180 tablet 0  insulin aspart (NOVOLOG FLEXPEN) 100 UNIT/ML injection pen Inject up to 15 units subcutaneously twice a day per sliding scale 30 mL 5    insulin glargine (LANTUS SOLOSTAR) 100 UNIT/ML injection pen Inject 40 Units into the skin 2 times daily (Patient taking differently: Inject 40 Units into the skin 2 times daily Pt states he takes 20 Units in AM and 25 Units in PM) 15 pen 1    blood glucose test strips (ACCU-CHEK GUIDE) strip 1 each by In Vitro route daily As needed.  zolpidem (AMBIEN) 10 MG tablet Take 1 tablet by mouth nightly as needed for Sleep for up to 30 days. 30 tablet 1    Insulin Pen Needle (B-D UF III MINI PEN NEEDLES) 31G X 5 MM MISC Use up to 3 times a day 100 each 3    Probiotic Product (PROBIOTIC-10 ULTIMATE PO) Take by mouth      dicyclomine (BENTYL) 10 MG capsule       aspirin 81 MG chewable tablet Take 81 mg by mouth daily      albuterol sulfate HFA (PROAIR HFA) 108 (90 Base) MCG/ACT inhaler Inhale 2 puffs into the lungs every 4 hours as needed for Wheezing or Shortness of Breath (Patient not taking: Reported on 2/24/2021) 1 Inhaler 0    pantoprazole (PROTONIX) 40 MG tablet TAKE 1 TABLET BY MOUTH DAILY (Patient not taking: Reported on 2/24/2021) 90 tablet 3     No current facility-administered medications on file prior to visit. Allergies:  Codeine, Dilaudid [hydromorphone hcl], Fentanyl, and Toradol [ketorolac tromethamine]     Review of Systems:   Review of Systems:   All 14 point review of symptoms completed. Pertinent positives identified in the HPI, all other review of symptoms negative as below.     Review of Systems - History obtained from the patient  General ROS: negative for - chills, fever or night sweats  Psychological ROS: negative for - disorientation or hallucinations  Ophthalmic ROS: negative for - dry eyes, eye pain or loss of vision  ENT ROS: negative for - nasal discharge or sore throat  Allergy and Immunology ROS: negative for - hives or itchy/watery eyes Hematological and Lymphatic ROS: negative for - jaundice or night sweats  Endocrine ROS: negative for - mood swings or temperature intolerance  Breast ROS: deferred  Respiratory ROS: negative for - hemoptysis or stridor  Cardiovascular ROS: negative for - chest pain, dyspnea on exertion or palpitations  Gastrointestinal ROS: no abdominal pain, change in bowel habits, or black or bloody stools  Genito-Urinary ROS: no dysuria, trouble voiding, or hematuria  Musculoskeletal ROS: negative for - gait disturbance, joint pain or joint stiffness  Neurological ROS: negative for - seizures or speech problems  Dermatological ROS: negative for - rash or skin lesion changes      Physical Examination:    Vitals:    02/24/21 1224   BP: 130/68   Pulse: 90   SpO2: 92%    Weight: 203 lb (92.1 kg)     Wt Readings from Last 3 Encounters:   02/24/21 203 lb (92.1 kg)   02/18/21 212 lb (96.2 kg)   02/12/21 212 lb (96.2 kg)     No intake or output data in the 24 hours ending 02/24/21 1321    General Appearance:  Alert, cooperative, no distress, appears stated age   Head:  Normocephalic, without obvious abnormality, atraumatic   Eyes:  PERRL, conjunctiva/corneas clear       Nose: Nares normal, no drainage or sinus tenderness   Throat: Lips, mucosa, and tongue normal   Neck: Supple, symmetrical, trachea midline, no adenopathy, thyroid: not enlarged, symmetric, no tenderness/mass/nodules, no carotid bruit or JVD       Lungs:   Clear to auscultation bilaterally, respirations unlabored   Chest Wall:  No tenderness or deformity   Heart:  Regular rhythm and normal rate; S1, S2 are normal; no murmur noted; no rub or gallop   Abdomen:   Soft, non-tender, bowel sounds active all four quadrants,  no masses, no organomegaly           Extremities: Extremities normal, atraumatic, no cyanosis or edema   Pulses: 2+ and symmetric   Skin: Skin color, texture, turgor normal, no rashes or lesions   Pysch: Normal mood and affect Neurologic: Normal gross motor and sensory exam.         Labs  No results for input(s): WBC, HGB, HCT, MCV, PLT in the last 72 hours. No results for input(s): CREATININE, BUN, NA, K, CL, CO2 in the last 72 hours. No results for input(s): INR, PROTIME in the last 72 hours. No results for input(s): TROPONINI in the last 72 hours. Invalid input(s): PRO-BNP  No results for input(s): CHOL, HDL in the last 72 hours. Invalid input(s): LDL, TG        Assessment:  56 y.o. patient with:  1. Dizziness   ~when bending over   ~lost 30 pounds  2. Mild nonobstructive coronary artery disease with preserved left ventricular function   ~Abnormal stress test   ~Cath April 28, 2015   ~Stress test 5/2019 normal   3. Hypertension   ~BP: (130)/(68)    4. Obesity   ~Body mass index is 27.53 kg/m². 5. Abnormal EKG with RBBB  6. Hyperlipidemia   ~on lipitor    Plan:  1. No changes today   2. Will await GI work up. Do not feel symptoms are cardiac related  3. Call the office after GI work up     This note was scribed in the presence of Dr. Angely Blair MD by Lavonne Reaves RN.     I, Dr. Tonya Contreras, personally performed the services described in this documentation, as scribed by the above signed scribe in my presence. It is both accurate and complete to my knowledge. I agree with the details independently gathered by the clinical support staff, while the remaining scribed note accurately describes my personal service to the patient. All questions and concerns were addressed to the patient/family. Alternatives to my treatment were discussed. The note was completed using EMR. Every effort was made to ensure accuracy; however, inadvertent computerized transcription errors may be present.     Tonya Contreras MD, [id number], [facility], [address] [place] office  [phone] [facility]  2/24/2021  1:21 PM

## 2021-03-01 ENCOUNTER — TELEPHONE (OUTPATIENT)
Dept: FAMILY MEDICINE CLINIC | Age: 61
End: 2021-03-01

## 2021-03-01 ENCOUNTER — HOSPITAL ENCOUNTER (OUTPATIENT)
Dept: GENERAL RADIOLOGY | Age: 61
Discharge: HOME | End: 2021-03-01
Payer: COMMERCIAL

## 2021-03-01 DIAGNOSIS — K59.00 CONSTIPATION, UNSPECIFIED CONSTIPATION TYPE: ICD-10-CM

## 2021-03-01 DIAGNOSIS — K56.699 SIGMOID STRICTURE (HCC): ICD-10-CM

## 2021-03-01 PROCEDURE — 74270 X-RAY XM COLON 1CNTRST STD: CPT

## 2021-03-01 PROCEDURE — 6360000004 HC RX CONTRAST MEDICATION: Performed by: INTERNAL MEDICINE

## 2021-03-01 RX ADMIN — IOHEXOL 450 ML: 240 INJECTION, SOLUTION INTRATHECAL; INTRAVASCULAR; INTRAVENOUS; ORAL at 09:03

## 2021-03-01 NOTE — TELEPHONE ENCOUNTER
Received a call from Madhuri at the [facility] ([facility]) regarding Jimena Diaz and scheduling him for an appointment. Madhuri states Dr. Jaret Damian does not have any available appointments unt April. I asked Madhuri to send the patient over to me. Patient is complaining of Weight loses of 60 lb within 4 months, Fatigue, lightheadedness when he stands up, two toes on his right foot hurts, Tingling in both legs from calf down to his feet, and his stomach feels full even though he is eating very little. He has seen GI lately, they order a CT-scan, Barium enema, Colonoscopy, and X-ray. Has been through cardiac work up as well, recently    They cannot find out what is wrong. Patient would like to come in to see you regarding the pain in the toes, Lightheadedness, tingling in his legs/feet. As well to discuss everything else. Please advise where patient can be scheduled.

## 2021-03-03 ENCOUNTER — TELEPHONE (OUTPATIENT)
Dept: FAMILY MEDICINE CLINIC | Age: 61
End: 2021-03-03

## 2021-03-04 ENCOUNTER — OFFICE VISIT (OUTPATIENT)
Dept: FAMILY MEDICINE CLINIC | Age: 61
End: 2021-03-04
Payer: COMMERCIAL

## 2021-03-04 VITALS
DIASTOLIC BLOOD PRESSURE: 78 MMHG | BODY MASS INDEX: 27.44 KG/M2 | TEMPERATURE: 97.3 F | SYSTOLIC BLOOD PRESSURE: 128 MMHG | HEART RATE: 73 BPM | WEIGHT: 202.6 LBS | OXYGEN SATURATION: 98 % | HEIGHT: 72 IN

## 2021-03-04 DIAGNOSIS — R10.30 LOWER ABDOMINAL PAIN: ICD-10-CM

## 2021-03-04 DIAGNOSIS — F43.9 STRESS: ICD-10-CM

## 2021-03-04 DIAGNOSIS — M25.50 ARTHRALGIA, UNSPECIFIED JOINT: ICD-10-CM

## 2021-03-04 DIAGNOSIS — R53.83 FATIGUE, UNSPECIFIED TYPE: Primary | ICD-10-CM

## 2021-03-04 LAB
C-REACTIVE PROTEIN: <3 MG/L (ref 0–5.1)
RHEUMATOID FACTOR: <10 IU/ML
SEDIMENTATION RATE, ERYTHROCYTE: 11 MM/HR (ref 0–20)
T4 FREE: 1.3 NG/DL (ref 0.9–1.8)
TSH SERPL DL<=0.05 MIU/L-ACNC: 0.93 UIU/ML (ref 0.27–4.2)

## 2021-03-04 PROCEDURE — 99213 OFFICE O/P EST LOW 20 MIN: CPT | Performed by: FAMILY MEDICINE

## 2021-03-04 RX ORDER — ESCITALOPRAM OXALATE 10 MG/1
10 TABLET ORAL DAILY
Qty: 30 TABLET | Refills: 0 | Status: SHIPPED | OUTPATIENT
Start: 2021-03-04 | End: 2021-03-15 | Stop reason: SDDI

## 2021-03-04 ASSESSMENT — ENCOUNTER SYMPTOMS
COUGH: 1
SORE THROAT: 1
TROUBLE SWALLOWING: 1
ABDOMINAL PAIN: 1
SHORTNESS OF BREATH: 1

## 2021-03-04 NOTE — PATIENT INSTRUCTIONS
Fatigue, unspecified type  -     TSH without Reflex  -     T4, Free  Lab today  Lower abdominal pain  Will monitor notify me if any increase in pain  Arthralgia, unspecified joint  -     Sedimentation rate, automated; Future  -     C-reactive protein  -     MEAGAN; Future  -     Rheumatoid Factor  Lab today  Stress  Prescription sent for Lexapro 10 mgcall me 2 weeks with an update  Other orders  -     escitalopram (LEXAPRO) 10 MG tablet;  Take 1 tablet by mouth daily  -     C-Reactive Protein  -     Sedimentation Rate

## 2021-03-04 NOTE — PROGRESS NOTES
Subjective:      Patient ID: Nayla Barahona is a 56 y.o. male. HPI  Near constant low abd pain-has had GI check up-neg--bloating-poor appetite--PND with sore throat. Nose feels cold to touch. Onset approximately 2 or 3 weeks. He states he has lost probably 50 pounds in the last 6 months. He has not worked since last summer due to some medical problems and also being laid off for the winter. He has had extensive work up from cardiology and GI in the last 2 weeks for various complaints. Probably his biggest problem is lower abdominal discomforthe does have mesh due to multiple abdominal surgeries actually over his entire abdomen and the discomfort is pretty much at the bottom of this mesh. He feels like his weight loss is due to the fact that he does not eat that much not because he is not hungry just because he gets bloated and and just feels bad. He is got some postnasal drip which makes him clear his throatgives him the sore throat. He denies actually being depressed or anxious but he does state that every day when he wakes up he is wondering much been a bother him next. Prior to Visit Medications :  Medication escitalopram (LEXAPRO) 10 MG tablet, Sig Take 1 tablet by mouth daily, Taking? Yes, Authorizing Provider Miguel Snow, DO    Medication atorvastatin (LIPITOR) 40 MG tablet, Sig Take 1 tablet by mouth every day, Taking? Yes, Authorizing Provider Miguel Snow, DO    Medication blood glucose test strips (ACCU-CHEK COMFORT CURVE) strip, Sig Accu-Chek guide me glucometer strips up to 3 times per day., Taking? Yes, Authorizing Provider Miguel Snow, DO    Medication metFORMIN (GLUCOPHAGE) 1000 MG tablet, Sig 1 bid, Taking? Yes, Authorizing Provider Miguel Snow, DO    Medication insulin aspart (NOVOLOG FLEXPEN) 100 UNIT/ML injection pen, Sig Inject up to 15 units subcutaneously twice a day per sliding scale, Taking?  Yes, Authorizing Provider Otf Snow, DO Medication insulin glargine (LANTUS SOLOSTAR) 100 UNIT/ML injection pen, Sig Inject 40 Units into the skin 2 times daily  Patient taking differently: Inject 40 Units into the skin 2 times daily Pt states he takes 20 Units in AM and 25 Units in PM, Taking? Yes, Authorizing Provider AMADA Magallanes - CNP    Medication blood glucose test strips (ACCU-CHEK GUIDE) strip, Sig 1 each by In Vitro route daily As needed. , Taking? Yes, Authorizing Provider Historical Provider, MD    Medication Insulin Pen Needle (B-D UF III MINI PEN NEEDLES) 31G X 5 MM MISC, Sig Use up to 3 times a day, Taking? Yes, Authorizing Provider Miguel Snow,     Medication Probiotic Product (PROBIOTIC-10 ULTIMATE PO), Sig Take by mouth, Taking? Yes, Authorizing Provider Historical Provider, MD    Medication dicyclomine (BENTYL) 10 MG capsule, Sig , Taking? Yes, Authorizing Provider Kari Terry MD    Medication aspirin 81 MG chewable tablet, Sig Take 81 mg by mouth daily, Taking? Yes, Authorizing Provider Historical Provider, MD    Medication albuterol sulfate HFA (PROAIR HFA) 108 (90 Base) MCG/ACT inhaler, Sig Inhale 2 puffs into the lungs every 4 hours as needed for Wheezing or Shortness of Breath  Patient not taking: Reported on 2/24/2021, Taking? , Authorizing Provider Alexis Sy DO      Past Medical History:  No date: Chronic back pain  No date: Chronic pain  No date: Diverticulitis  No date: DVT (deep venous thrombosis) ([facility])  No date: Hyperlipidemia  No date: Irritable bowel syndrome  No date: Pain medication agreement signed      Comment:  discontued care due eye surgery/imbilical hernia surgery               -  No date: Type II or unspecified type diabetes mellitus without   mention of complication, not stated as uncontrolled  No date: Umbilical hernia      Comment:  having surgery 11/23        Review of Systems    Review of Systems   Constitutional: Positive for unexpected weight change. Negative for fever.         See HPI HENT: Positive for postnasal drip, sore throat and trouble swallowing. Respiratory: Positive for cough and shortness of breath. Gastrointestinal: Positive for abdominal pain. See chief complaint   Genitourinary: Positive for frequency. Musculoskeletal: Positive for arthralgias. Psychiatric/Behavioral: Positive for sleep disturbance. Objective:   Physical Exam      Physical Exam  Constitutional:       General: He is not in acute distress. Appearance: Normal appearance. He is not ill-appearing. Cardiovascular:      Rate and Rhythm: Normal rate and regular rhythm. Heart sounds: Normal heart sounds. Pulmonary:      Effort: Pulmonary effort is normal.      Breath sounds: Normal breath sounds. Abdominal:      General: There is no distension. Palpations: Abdomen is soft. Comments: Tender low abdomen mostly on lower left quadrant   Musculoskeletal:      Right lower leg: No edema. Left lower leg: No edema. Neurological:      Mental Status: He is alert and oriented to person, place, and time. Psychiatric:         Mood and Affect: Mood normal.         Behavior: Behavior normal.         Thought Content: Thought content normal.         Judgment: Judgment normal.         Assessment:      1. Fatigue, unspecified type    2. Lower abdominal pain    3. Arthralgia, unspecified joint    4. Stress              Plan:      Leyda Murry was seen today for weight loss, fatigue and abdominal pain. Diagnoses and all orders for this visit:    Fatigue, unspecified type  -     TSH without Reflex  -     T4, Free  Lab today  Lower abdominal pain  Will monitor notify me if any increase in pain  Arthralgia, unspecified joint  -     Sedimentation rate, automated; Future  -     C-reactive protein  -     MEAGAN;  Future  -     Rheumatoid Factor  Lab today  Stress  Prescription sent for Lexapro 10 mgcall me 2 weeks with an update  Other orders -     escitalopram (LEXAPRO) 10 MG tablet;  Take 1 tablet by mouth daily  -     C-Reactive Protein  -     Sedimentation Rate              Miguel Snow, DO

## 2021-03-05 LAB — ANTI-NUCLEAR ANTIBODY (ANA): NEGATIVE

## 2021-03-05 NOTE — TELEPHONE ENCOUNTER
Submitted PA for Accu-Chek Guide strips, Key: [id number]. Medication has been APPROVED through 03/05/2022. Please notify patient. Thank you.

## 2021-03-08 ENCOUNTER — TELEPHONE (OUTPATIENT)
Dept: FAMILY MEDICINE CLINIC | Age: 61
End: 2021-03-08

## 2021-03-08 ENCOUNTER — TELEPHONE (OUTPATIENT)
Dept: CARDIOLOGY CLINIC | Age: 61
End: 2021-03-08

## 2021-03-08 DIAGNOSIS — I63.00 CEREBROVASCULAR ACCIDENT (CVA) DUE TO THROMBOSIS OF PRECEREBRAL ARTERY (HCC): Primary | ICD-10-CM

## 2021-03-08 DIAGNOSIS — R42 LIGHTHEADEDNESS: ICD-10-CM

## 2021-03-08 NOTE — TELEPHONE ENCOUNTER
I think a referral to a neurologist would be the next step. I did not see any primary cardiac issue that should be the cause.

## 2021-03-08 NOTE — TELEPHONE ENCOUNTER
Still having issues with dizziness. Same SX as he was having before his visit with [facility]. PCP does not know what is causing. Numbness in feet and hands along with other SX at OV seem to be getting worse. Please advise.

## 2021-03-09 ENCOUNTER — INITIAL CONSULT (OUTPATIENT)
Dept: SURGERY | Age: 61
End: 2021-03-09
Payer: COMMERCIAL

## 2021-03-09 ENCOUNTER — TELEPHONE (OUTPATIENT)
Dept: SURGERY | Age: 61
End: 2021-03-09

## 2021-03-09 VITALS
DIASTOLIC BLOOD PRESSURE: 83 MMHG | HEIGHT: 72 IN | BODY MASS INDEX: 27.77 KG/M2 | SYSTOLIC BLOOD PRESSURE: 123 MMHG | TEMPERATURE: 97.5 F | WEIGHT: 205 LBS

## 2021-03-09 DIAGNOSIS — R10.84 GENERALIZED ABDOMINAL PAIN: Primary | ICD-10-CM

## 2021-03-09 PROCEDURE — 99243 OFF/OP CNSLTJ NEW/EST LOW 30: CPT | Performed by: SURGERY

## 2021-03-09 NOTE — PROGRESS NOTES
New Patient Via Waqar Sprague MD    [address], [address] [facility]  [place], [facility]  [phone]    Froilan Masterson   YOB: 1960    Date of Visit:  3/9/2021    Daisy [place],     Chief Complaint: Abdominal pain    HPI: Patient presents for evaluation of abdominal pain. He states he has had this over the course of the past few weeks. He has a significant history of complicated diverticulitis, status post multiple abdominal operations. In addition, he has had multiple abdominal hernia repairs. He developed an infection after a prior hernia repair, requiring mesh removal and subsequent reconstruction. Recently he began to have problems with constipation. He was straining to have a bowel movement. He states that is better now, but he feels like he might of strained his abdominal wall and possibly damaged his hernia repair. He points to his lower abdomen as a site of discomfort    Allergies   Allergen Reactions    Codeine Hives and Nausea Only     25 years ago-unsure if allergy stated    Dilaudid [Hydromorphone Hcl]     Fentanyl      Patch caused skin irritation. IV does not bother pt has had fentanyl IV before.  Toradol [Ketorolac Tromethamine]      Outpatient Medications Marked as Taking for the 3/9/21 encounter (Initial consult) with Teodora Saldivar MD   Medication Sig Dispense Refill    escitalopram (LEXAPRO) 10 MG tablet Take 1 tablet by mouth daily 30 tablet 0    albuterol sulfate HFA (PROAIR HFA) 108 (90 Base) MCG/ACT inhaler Inhale 2 puffs into the lungs every 4 hours as needed for Wheezing or Shortness of Breath 1 Inhaler 0    atorvastatin (LIPITOR) 40 MG tablet Take 1 tablet by mouth every day 90 tablet 3    blood glucose test strips (ACCU-CHEK COMFORT CURVE) strip Accu-Chek guide me glucometer strips up to 3 times per day.  300 each 3    metFORMIN (GLUCOPHAGE) 1000 MG tablet 1 bid 180 tablet 0    insulin aspart (NOVOLOG FLEXPEN) 100 UNIT/ML injection pen Inject up to 15 units subcutaneously twice a day per sliding scale 30 mL 5    insulin glargine (LANTUS SOLOSTAR) 100 UNIT/ML injection pen Inject 40 Units into the skin 2 times daily (Patient taking differently: Inject 40 Units into the skin 2 times daily Pt states he takes 20 Units in AM and 25 Units in PM) 15 pen 1    blood glucose test strips (ACCU-CHEK GUIDE) strip 1 each by In Vitro route daily As needed.       Insulin Pen Needle (B-D UF III MINI PEN NEEDLES) 31G X 5 MM MISC Use up to 3 times a day 100 each 3    Probiotic Product (PROBIOTIC-10 ULTIMATE PO) Take by mouth      dicyclomine (BENTYL) 10 MG capsule       aspirin 81 MG chewable tablet Take 81 mg by mouth daily         Past Medical History:   Diagnosis Date    Chronic back pain     Chronic pain     Diverticulitis     [facility]     Hyperlipidemia     Irritable bowel syndrome     Pain medication agreement signed     discontued care due eye surgery/imbilical hernia surgery -    Type II or unspecified type diabetes mellitus without mention of complication, not stated as uncontrolled     Umbilical hernia     having surgery 11/23     Past Surgical History:   Procedure Laterality Date    ABDOMEN SURGERY      ABDOMINAL ADHESION SURGERY      CATARACT REMOVAL WITH IMPLANT Left 11/13/15    COLON SURGERY      times 3    COLONOSCOPY      HERNIA REPAIR      OTHER SURGICAL HISTORY  9-26-12    EXCISION OF RIGHT THYROID NODULE          THROAT SURGERY Bilateral october 2013    TONSILLECTOMY      UPPER GASTROINTESTINAL ENDOSCOPY  02/10/2017    gastritis/duodenitis     Family History   Problem Relation Age of Onset    Cancer Mother     Heart Disease Father     Cancer Other     Heart Disease Other      Social History     Socioeconomic History    Marital status:      Spouse name: Not on file    Number of children: Not on file    Years of education: Not on file    Highest education level: Not on file   Occupational History    Occupation: Construction   Social Needs    Financial resource strain: Not on file    Food insecurity     Worry: Not on file     Inability: Not on file   [employer] needs     Medical: Not on file     Non-medical: Not on file   Tobacco Use    Smoking status: Former Smoker     Packs/day: 1.00     Years: 3.00     Pack years: 3.00     Quit date: 1980     Years since quittin.2    Smokeless tobacco: Never Used   Substance and Sexual Activity    Alcohol use: No     Alcohol/week: 0.0 standard drinks    Drug use: No    Sexual activity: Yes   Lifestyle    Physical activity     Days per week: Not on file     Minutes per session: Not on file    Stress: Not on file   Relationships    Social connections     Talks on phone: Not on file     Gets together: Not on file     Attends Quaker service: Not on file     Active member of club or organization: Not on file     Attends meetings of clubs or organizations: Not on file     Relationship status: Not on file    Intimate partner violence     Fear of current or ex partner: Not on file     Emotionally abused: Not on file     Physically abused: Not on file     Forced sexual activity: Not on file   Other Topics Concern    Not on file   Social History Narrative    Not on file          Vitals:    21 1001   BP: 123/83   Site: Right Lower Arm   Position: Sitting   Cuff Size: Large Adult   Temp: 97.5 °F (36.4 °C)   TempSrc: Temporal   Weight: 205 lb (93 kg)   Height: 6' (1.829 m)     Body mass index is 27.8 kg/m².      Wt Readings from Last 3 Encounters:   21 205 lb (93 kg)   21 202 lb 9.6 oz (91.9 kg)   21 203 lb (92.1 kg)     BP Readings from Last 3 Encounters:   21 123/83   21 128/78   21 130/68        REVIEW OF SYSTEMS:  CONSTITUTIONAL:  negative  HEENT:  Negative  RESPIRATORY:  negative  CARDIOVASCULAR:  negative GASTROINTESTINAL:  positive for constipation and abdominal pain  GENITOURINARY:  negative  HEMATOLOGIC/LYMPHATIC:  negative  ENDOCRINE:  Negative  NEUROLOGICAL:  Negative  * All other ROS reviewed and negative. PE:  Constitutional:  Well developed, well nourished, no acute distress, non-toxic appearance   Eyes:  PERRL, conjunctiva normal   HENT:  Atraumatic, external ears normal, nose normal. Neck- normal range of motion, no tenderness, supple   Respiratory:  No respiratory distress, normal breath sounds, no rales, no wheezing   Cardiovascular:  Normal rate, normal rhythm  GI: Bowel sounds positive, soft, mild tenderness across the lower abdomen. I do not feel any obvious hernia recurrence  :  No costovertebral angle tenderness   Integument:  Well hydrated, no rash   Lymphatic:  No lymphadenopathy noted   Neurologic:  Alert & oriented x 3, no focal deficits noted   Psychiatric:  Speech and behavior appropriate       DATA:  Radiology Review: Hypaque enema was reviewed and shows no sign of stricture or obstruction      Assessment:  1. Generalized abdominal pain        Plan: The etiology of his pain is unclear. He may just have an abdominal strain from the constipation. There is no sign of recurrent hernia on examination. Given his concerns, CT scan will be obtained for more definitive evaluation. Further treatment plan will be based on the results of his imaging.

## 2021-03-11 ENCOUNTER — TELEPHONE (OUTPATIENT)
Dept: ENDOCRINOLOGY | Age: 61
End: 2021-03-11

## 2021-03-11 DIAGNOSIS — Z79.4 TYPE 2 DIABETES MELLITUS WITHOUT COMPLICATION, WITH LONG-TERM CURRENT USE OF INSULIN (HCC): Primary | ICD-10-CM

## 2021-03-11 DIAGNOSIS — E11.9 TYPE 2 DIABETES MELLITUS WITHOUT COMPLICATION, WITH LONG-TERM CURRENT USE OF INSULIN (HCC): Primary | ICD-10-CM

## 2021-03-11 NOTE — TELEPHONE ENCOUNTER
Called patient  Reviewed recent labs and imaging  Patient has ruled out DVT in recent ED visit, evaluated per stroke protocol also   Will refer to podiatrist  Discussed seeing vascular/vein specialist also as has significant varicose veins which are worse now

## 2021-03-11 NOTE — TELEPHONE ENCOUNTER
Pt has been sick for a while with stomach issues. Hes been Mel Bernal around a lot because he dont feel good. About 3 weeks ago his feet have been bothering him a lot and he cant get them warm at all. Pt is having tingling feeling in his toes. Had a doppler done no clots. He would like to know what he should do.

## 2021-03-14 DIAGNOSIS — M79.672 PAIN IN BOTH FEET: Primary | ICD-10-CM

## 2021-03-14 DIAGNOSIS — M79.671 PAIN IN BOTH FEET: Primary | ICD-10-CM

## 2021-03-15 ENCOUNTER — NURSE TRIAGE (OUTPATIENT)
Dept: OTHER | Facility: CLINIC | Age: 61
End: 2021-03-15

## 2021-03-15 ENCOUNTER — OFFICE VISIT (OUTPATIENT)
Dept: FAMILY MEDICINE CLINIC | Age: 61
End: 2021-03-15
Payer: COMMERCIAL

## 2021-03-15 VITALS
DIASTOLIC BLOOD PRESSURE: 74 MMHG | WEIGHT: 205 LBS | TEMPERATURE: 97.2 F | SYSTOLIC BLOOD PRESSURE: 128 MMHG | BODY MASS INDEX: 29.35 KG/M2 | HEART RATE: 92 BPM | OXYGEN SATURATION: 99 % | HEIGHT: 70 IN

## 2021-03-15 DIAGNOSIS — H66.92 ACUTE LEFT OTITIS MEDIA: ICD-10-CM

## 2021-03-15 DIAGNOSIS — R35.0 URINARY FREQUENCY: Primary | ICD-10-CM

## 2021-03-15 DIAGNOSIS — J30.2 SEASONAL ALLERGIES: ICD-10-CM

## 2021-03-15 LAB
BILIRUBIN, POC: NORMAL
BLOOD URINE, POC: NORMAL
CLARITY, POC: CLEAR
COLOR, POC: YELLOW
GLUCOSE URINE, POC: NORMAL
KETONES, POC: NORMAL
LEUKOCYTE EST, POC: NORMAL
NITRITE, POC: NORMAL
PH, POC: 5
PROTEIN, POC: NORMAL
SPECIFIC GRAVITY, POC: 1.01
UROBILINOGEN, POC: 0.2

## 2021-03-15 PROCEDURE — 81002 URINALYSIS NONAUTO W/O SCOPE: CPT | Performed by: PHYSICIAN ASSISTANT

## 2021-03-15 PROCEDURE — 99213 OFFICE O/P EST LOW 20 MIN: CPT | Performed by: PHYSICIAN ASSISTANT

## 2021-03-15 RX ORDER — MONTELUKAST SODIUM 10 MG/1
10 TABLET ORAL DAILY
Qty: 90 TABLET | Refills: 1 | Status: SHIPPED | OUTPATIENT
Start: 2021-03-15 | End: 2021-08-19

## 2021-03-15 RX ORDER — LINACLOTIDE 72 UG/1
1 CAPSULE, GELATIN COATED ORAL DAILY
COMMUNITY
Start: 2021-02-05 | End: 2022-05-04 | Stop reason: ALTCHOICE

## 2021-03-15 RX ORDER — AMOXICILLIN 875 MG/1
875 TABLET, COATED ORAL 2 TIMES DAILY
Qty: 20 TABLET | Refills: 0 | Status: SHIPPED | OUTPATIENT
Start: 2021-03-15 | End: 2021-03-25

## 2021-03-15 RX ORDER — AZELASTINE HYDROCHLORIDE, FLUTICASONE PROPIONATE 137; 50 UG/1; UG/1
1 SPRAY, METERED NASAL DAILY
Qty: 1 BOTTLE | Refills: 1 | Status: SHIPPED | OUTPATIENT
Start: 2021-03-15 | End: 2021-07-21

## 2021-03-15 ASSESSMENT — ENCOUNTER SYMPTOMS
SHORTNESS OF BREATH: 0
SORE THROAT: 1
NAUSEA: 0
COUGH: 0
ABDOMINAL PAIN: 1
VOMITING: 0
DIARRHEA: 0
CONSTIPATION: 0
RHINORRHEA: 0

## 2021-03-15 ASSESSMENT — PATIENT HEALTH QUESTIONNAIRE - PHQ9: 1. LITTLE INTEREST OR PLEASURE IN DOING THINGS: 0

## 2021-03-15 NOTE — PROGRESS NOTES
3/15/2021  Favio Quintana (: 1960)  56 y.o. HPI    Patient presents for evaluation of sinus and urinary symptoms. States that he has had post nasal drip, sore throat  Left ear pain, thinks due to rinsing out ear. New lymph nodes under chin. Denies fever. +increased fatigue (has had work up including labs)  Patient used flonase 1 day and irritated his sinuses. Some improvement with claritin. Now with bilateral eye pain, feels sore. No changes in vision. Also with increased urinary frequency for the last couple of days   States he was up every hour last night  Also with suprapubic discomfort   Undergoing work up for generalized abdominal pain. Has history of recurrent diverticulitis and multiple abdominal surgeries. Has seen GI who referred to gen surg. After initial eval by gen surg, ct abd/pelvis ordered. Pt to complete tomorrow. Denies diarrhea. Does have constipation. Denies nausea and vomiting. Review of Systems   Constitutional: Negative for activity change, chills and fever. HENT: Positive for congestion, ear pain, postnasal drip and sore throat. Negative for rhinorrhea. Eyes: Negative for visual disturbance. Respiratory: Negative for cough and shortness of breath. Cardiovascular: Negative for chest pain and palpitations. Gastrointestinal: Positive for abdominal pain. Negative for constipation, diarrhea, nausea and vomiting. Genitourinary: Negative for difficulty urinating and dysuria. Musculoskeletal: Negative for arthralgias and myalgias. Skin: Negative for rash. Neurological: Negative for dizziness, weakness and numbness. Psychiatric/Behavioral: Negative for sleep disturbance. Allergies, past medical history, family history, and social history reviewed and unchanged from previous encounter.      Current Outpatient Medications   Medication Sig Dispense Refill    LINZESS 72 MCG CAPS capsule Take 1 capsule by mouth daily      rifaximin (XIFAXAN) 550 well-developed. HENT:      Head: Normocephalic and atraumatic. Right Ear: Hearing normal. A middle ear effusion is present. Left Ear: Decreased hearing noted. Tenderness present. A middle ear effusion is present. Tympanic membrane is injected and erythematous. Eyes:      Conjunctiva/sclera: Conjunctivae normal.      Pupils: Pupils are equal, round, and reactive to light. Neck:      Musculoskeletal: Neck supple. Cardiovascular:      Rate and Rhythm: Normal rate and regular rhythm. Heart sounds: Normal heart sounds. No murmur. Pulmonary:      Effort: Pulmonary effort is normal.      Breath sounds: Normal breath sounds. No wheezing. Abdominal:      General: Bowel sounds are normal.      Palpations: Abdomen is soft. Tenderness: There is no abdominal tenderness. Lymphadenopathy:      Cervical: Cervical adenopathy present. Left cervical: Superficial cervical adenopathy present. Skin:     General: Skin is warm and dry. Findings: No rash. Neurological:      Mental Status: He is alert and oriented to person, place, and time. Deep Tendon Reflexes: Reflexes are normal and symmetric. ASSESSMENT and PLAN:  Missael Little was seen today for urinary frequency, sinusitis and otalgia. Diagnoses and all orders for this visit:    Urinary frequency  Suprapubic abdominal pain  -     POCT Urinalysis no Micro - neg  - likely related to ongoing abdominal issues, will evaluated further with CT abd/pelvis ordered by Dr. Efren Moreno. Acute left otitis media  -     amoxicillin (AMOXIL) 875 MG tablet; Take 1 tablet by mouth 2 times daily for 10 days    Seasonal allergies  -     Azelastine-Fluticasone 137-50 MCG/ACT SUSP; 1 spray by Nasal route daily  -     montelukast (SINGULAIR) 10 MG tablet; Take 1 tablet by mouth daily      Return if symptoms worsen or fail to improve.

## 2021-03-15 NOTE — DISCHARGE SUMMARY
Hospital Medicine Discharge Summary    Patient ID: Manolo Wu      Patient's PCP: Sakina Bahena DO    Admit Date: 2/12/2021     Discharge Date: 2/13/2021      Admitting Physician: Florida Aguilar MD     Discharge Physician: Jesus Trujillo, APRN - CNP     Discharge Diagnoses: Active Hospital Problems    Diagnosis    Left facial numbness [R20.0]    Dyslipidemia [E78.5]    Cerebrovascular accident (CVA) ([facility] [address] [I63.9]    Abdominal pain [R10.9]    Left leg pain [M79.605]    Type 2 diabetes mellitus without complication ([facility] [address] [X56.2]       The patient was seen and examined on day of discharge and this discharge summary is in conjunction with any daily progress note from day of discharge. Hospital Course:     56 y.o. male who presented to [facility] with facial numbness. PMHx significant for diabetes, hypertension, prior DVT, irritable bowel syndrome, multiple abdominal surgeries including extensive hernia repair. He presented with a multitude of complaints. Initially reports severe abdominal pain over the last few weeks for which she has been following with his GI Dr. Sara Lorenzo; he reports recent negative CT scan and negative colonoscopy. He also reports 1 to 2 weeks of left lower extremity pain. He initially presented to his PCP on the day of admission with complaint of left lower extremity pain. He was concerned given his history of prior DVT. He was referred to the hospital for a left lower extremity ultrasound which was found to be negative. At the time of the ultrasound the patient started to have some sudden left sided facial numbness and tingling, from his forehead down into his mouth. He also noted some numbness in the left shoulder and left chest.  He also describes some pain in his chest and associated with some shortness of breath. It was then taken to [facility] emergency department for further evaluation.   Most of the symptoms resolved although he had persistent numbness and tingling in his left face. The stroke team was consulted, however he was not considered a TPA candidate given his very minimal symptoms. He was transferred to [facility] for further evaluation and neurologic work-up. Possible TIA: Somewhat atypical presentation with numbness and tingling of the left face and left upper arm. Symptoms have mostly resolved except for some numbness down into his lower face. Head CT as well as CTA head and neck are unremarkable. MRI of brain negative. Recommend echocardiogram as outpatient since MRI negative and unable to have over the weekend while hospitalized. Continue aspirin and statin. Continue blood pressure control.     Diabetes Mellitus, Type 2: Controlled. A1c 5.8. Continue basal-bolus Insulin regimen. Monitor blood sugar and adjust regimen as needed. Diabetic (Carb-controlled) diet.     Left leg pain: Etiology unclear. Left lower extremity Doppler is negative for DVT.     Abdominal pain: Etiology unclear. Has had recent extensive work-up with his GI DrBang Hazel, including a negative CT scan and colonoscopy. No high risk features. Defer to outpatient management. Physical Exam Performed:     /84   Pulse 85   Temp 97.4 °F (36.3 °C) (Oral)   Resp 16   Ht 6' (1.829 m)   Wt 212 lb (96.2 kg)   SpO2 95%   BMI 28.75 kg/m²       General appearance:  No apparent distress, appears stated age and cooperative. HEENT:  Normal cephalic, atraumatic without obvious deformity. Pupils equal, round, and reactive to light. Extra ocular muscles intact. Conjunctivae/corneas clear. Neck: Supple, with full range of motion. No jugular venous distention. Trachea midline. Respiratory:  Normal respiratory effort. Clear to auscultation, bilaterally without Rales/Wheezes/Rhonchi. Cardiovascular:  Regular rate and rhythm with normal S1/S2 without murmurs, rubs or gallops. Abdomen: Soft, non-tender, non-distended with normal bowel sounds. Musculoskeletal:  No clubbing, cyanosis or edema bilaterally. Full range of motion without deformity. Skin: Skin color, texture, turgor normal.  No rashes or lesions. Neurologic:  Neurovascularly intact without any focal sensory/motor deficits. Cranial nerves: II-XII intact, grossly non-focal.  Psychiatric:  Alert and oriented, thought content appropriate, normal insight  Capillary Refill: Brisk,< 3 seconds   Peripheral Pulses: +2 palpable, equal bilaterally       Labs: For convenience and continuity at follow-up the following most recent labs are provided:      CBC:    Lab Results   Component Value Date    WBC 8.0 02/18/2021    HGB 14.3 02/18/2021    HCT 41.6 02/18/2021     02/18/2021       Renal:    Lab Results   Component Value Date     02/18/2021    K 4.3 02/18/2021     02/18/2021    CO2 26 02/18/2021    BUN 29 02/18/2021    CREATININE 1.1 02/18/2021    CALCIUM 10.5 02/18/2021    PHOS 2.0 11/25/2015         Significant Diagnostic Studies    Radiology:   MRI brain without contrast   Final Result   Unremarkable MRI of the brain without contrast.         MRI BRAIN WO CONTRAST    (Results Pending)          Consults:     IP CONSULT TO NEUROLOGY    Disposition:  Home     Condition at Discharge: Stable    Discharge Instructions/Follow-up:  F/u with PCP in 1-2 weeks. Recommend outpatient ECHO.     Code Status:  Full    Activity: activity as tolerated    Diet: diabetic diet      Discharge Medications:     Discharge Medication List as of 2/13/2021  5:56 PM           Details   atorvastatin (LIPITOR) 40 MG tablet Take 1 tablet by mouth every day, Disp-90 tablet, R-3Refill [id number]      !! blood glucose test strips (ACCU-CHEK COMFORT CURVE) strip Accu-Chek guide me glucometer strips up to 3 times per day., Disp-300 each, R-3Normal      metFORMIN (GLUCOPHAGE) 1000 MG tablet 1 bid, Disp-180 tablet, R-0Refill [id number]      insulin aspart (NOVOLOG FLEXPEN) 100 UNIT/ML injection pen Inject up to 15 units subcutaneously twice a day per sliding scale, Disp-30 mL, R-5Refill [id number]      insulin glargine (LANTUS SOLOSTAR) 100 UNIT/ML injection pen Inject 40 Units into the skin 2 times daily, Disp-15 pen, R-1Normal      !! blood glucose test strips (ACCU-CHEK GUIDE) strip 1 each by In Vitro route daily As needed. Historical Med      pantoprazole (PROTONIX) 40 MG tablet TAKE 1 TABLET BY MOUTH DAILY, Disp-90 tablet, R-3Normal      zolpidem (AMBIEN) 10 MG tablet Take 1 tablet by mouth nightly as needed for Sleep for up to 30 days. , Disp-30 tablet, R-1Normal      Insulin Pen Needle (B-D UF III MINI PEN NEEDLES) 31G X 5 MM MISC Disp-100 each, R-3, NormalUse up to 3 times a day      Probiotic Product (PROBIOTIC-10 ULTIMATE PO) Take by mouthHistorical Med      dicyclomine (BENTYL) 10 MG capsule Historical Med      aspirin 81 MG chewable tablet Take 81 mg by mouth dailyHistorical Med       !! - Potential duplicate medications found. Please discuss with provider. Time Spent on discharge is more than 30 minutes in the examination, evaluation, counseling and review of medications and discharge plan. Signed:    AMADA Mosley - CNP   3/15/2021      Thank you Sunny Callahan DO for the opportunity to be involved in this patient's care. If you have any questions or concerns please feel free to contact me at [phone].

## 2021-03-15 NOTE — TELEPHONE ENCOUNTER
throat    9. PREGNANCY: \"Is there any chance you are pregnant? \" \"When was your last menstrual period? \"      n/a    Protocols used: URINARY SYMPTOMS-ADULT-[place], SINUS PAIN OR CONGESTION-ADULT-[place]    [facility]  with red flag complaint. Brief description of triage: as above started with urinary frequency last night every hour no pain or burning also c/o sinus pain and congestion    Triage indicates for patient to be seen today    Care advice provided, patient verbalizes understanding; denies any other questions or concerns; instructed to call back for any new or worsening symptoms. Writer provided warm transfer to [facility]  at [facility] for appointment scheduling. Attention Provider: Thank you for allowing me to participate in the care of your patient. The patient was connected to triage in response to information provided to the ECC. Please do not respond through this encounter as the response is not directed to a shared pool.

## 2021-03-16 ENCOUNTER — HOSPITAL ENCOUNTER (OUTPATIENT)
Dept: CT IMAGING | Age: 61
Discharge: HOME | End: 2021-03-16
Payer: COMMERCIAL

## 2021-03-16 DIAGNOSIS — R10.84 GENERALIZED ABDOMINAL PAIN: ICD-10-CM

## 2021-03-16 PROCEDURE — 74177 CT ABD & PELVIS W/CONTRAST: CPT

## 2021-03-16 PROCEDURE — 6360000004 HC RX CONTRAST MEDICATION: Performed by: SURGERY

## 2021-03-16 RX ADMIN — IOHEXOL 50 ML: 240 INJECTION, SOLUTION INTRATHECAL; INTRAVASCULAR; INTRAVENOUS; ORAL at 09:19

## 2021-03-16 RX ADMIN — IOPAMIDOL 75 ML: 755 INJECTION, SOLUTION INTRAVENOUS at 10:20

## 2021-03-17 ENCOUNTER — TELEPHONE (OUTPATIENT)
Dept: SURGERY | Age: 61
End: 2021-03-17

## 2021-03-17 NOTE — TELEPHONE ENCOUNTER
----- Message from Diana Bello MD sent at 3/16/2021  3:14 PM EDT -----  PLease call with CT- no hernia.  Likely just abdominal strain as I suspected

## 2021-03-17 NOTE — TELEPHONE ENCOUNTER
Pt notified of CT results- no hernia, I advised if he is having discomfort- he can follow up with his pcp.

## 2021-03-19 ENCOUNTER — OFFICE VISIT (OUTPATIENT)
Dept: ENT CLINIC | Age: 61
End: 2021-03-19
Payer: COMMERCIAL

## 2021-03-19 VITALS
HEIGHT: 72 IN | TEMPERATURE: 97.5 F | HEART RATE: 107 BPM | DIASTOLIC BLOOD PRESSURE: 78 MMHG | BODY MASS INDEX: 27.68 KG/M2 | SYSTOLIC BLOOD PRESSURE: 113 MMHG | WEIGHT: 204.4 LBS

## 2021-03-19 DIAGNOSIS — H69.82 DYSFUNCTION OF LEFT EUSTACHIAN TUBE: ICD-10-CM

## 2021-03-19 DIAGNOSIS — J30.9 ALLERGIC RHINITIS, UNSPECIFIED SEASONALITY, UNSPECIFIED TRIGGER: ICD-10-CM

## 2021-03-19 DIAGNOSIS — H93.12 TINNITUS OF LEFT EAR: ICD-10-CM

## 2021-03-19 DIAGNOSIS — R09.89 GLOBUS SENSATION: ICD-10-CM

## 2021-03-19 DIAGNOSIS — H92.02 LEFT EAR PAIN: Primary | ICD-10-CM

## 2021-03-19 DIAGNOSIS — K21.9 GASTROESOPHAGEAL REFLUX DISEASE, UNSPECIFIED WHETHER ESOPHAGITIS PRESENT: Primary | ICD-10-CM

## 2021-03-19 PROCEDURE — 31575 DIAGNOSTIC LARYNGOSCOPY: CPT | Performed by: OTOLARYNGOLOGY

## 2021-03-19 PROCEDURE — 99204 OFFICE O/P NEW MOD 45 MIN: CPT | Performed by: OTOLARYNGOLOGY

## 2021-03-19 RX ORDER — FAMOTIDINE 20 MG/1
20 TABLET, FILM COATED ORAL 2 TIMES DAILY
Qty: 180 TABLET | Refills: 3 | Status: SHIPPED | OUTPATIENT
Start: 2021-03-19 | End: 2022-08-16 | Stop reason: SDUPTHER

## 2021-03-19 ASSESSMENT — ENCOUNTER SYMPTOMS
COUGH: 0
VOICE CHANGE: 0
FACIAL SWELLING: 0
APNEA: 0
SORE THROAT: 0
RHINORRHEA: 1
EYE ITCHING: 0
SHORTNESS OF BREATH: 0
TROUBLE SWALLOWING: 0
SINUS PRESSURE: 0

## 2021-03-19 NOTE — PROGRESS NOTES
Trev Shaffer 89, [address], [address] [place] [address]  P: [phone]       Patient     Robert Umana  1960    ChiefComplaint     Chief Complaint   Patient presents with    Ear Problem     States left ear gets sharp pains, went to PCP, was given antibiotics. has not helped, has faint ringing.  Nasal Congestion     States has drainage into back of throat, was given nasal spray to take. States throat was sore where he swallows. History of Present Illness     Lenore Alvarez 67-year-old male here today for evaluation of globus sensation, left ear pressure/pain and rhinorrhea. Symptoms began approximately 6 weeks ago after onset of significant intra-abdominal pathology. During that time he had severe reflux which he initially treated with resolution of globus but then he stopped his reflux medication globus returned. Also notes left-sided ear pain that describes as a sharp stabbing with swallowing as well as pressure sensation with mild ringing. Saw PCP who started him on Dymista, Singulair and oral antibiotic. He reports improvement in rhinorrhea as well as ear pain since starting nasal spray and Singulair as well as antibiotic. Globus is overall improved but continues intermittently.     Past Medical History     Past Medical History:   Diagnosis Date    Chronic back pain     Chronic pain     Diverticulitis     DVT (deep venous thrombosis) (HCC)     Hyperlipidemia     Irritable bowel syndrome     Pain medication agreement signed     discontued care due eye surgery/imbilical hernia surgery -    Type II or unspecified type diabetes mellitus without mention of complication, not stated as uncontrolled     Umbilical hernia     having surgery 11/23       Past Surgical History     Past Surgical History:   Procedure Laterality Date    ABDOMEN SURGERY      ABDOMINAL ADHESION SURGERY      CATARACT REMOVAL WITH IMPLANT Left 11/13/15    COLON SURGERY      times 3    COLONOSCOPY  HERNIA REPAIR      OTHER SURGICAL HISTORY  12    EXCISION OF RIGHT THYROID NODULE          THROAT SURGERY Bilateral 2013    TONSILLECTOMY      UPPER GASTROINTESTINAL ENDOSCOPY  02/10/2017    gastritis/duodenitis       Family History     Family History   Problem Relation Age of Onset    Cancer Mother     Heart Disease Father     Cancer Other     Heart Disease Other        Social History     Social History     Tobacco Use    Smoking status: Former Smoker     Packs/day: 1.00     Years: 3.00     Pack years: 3.00     Quit date: 1980     Years since quittin.2    Smokeless tobacco: Never Used   Substance Use Topics    Alcohol use: No     Alcohol/week: 0.0 standard drinks    Drug use: No        Allergies     Allergies   Allergen Reactions    Codeine Hives and Nausea Only     25 years ago-unsure if allergy stated    Dilaudid [Hydromorphone Hcl]     Fentanyl      Patch caused skin irritation. IV does not bother pt has had fentanyl IV before.  Toradol [Ketorolac Tromethamine]        Medications     Current Outpatient Medications   Medication Sig Dispense Refill    famotidine (PEPCID) 20 MG tablet Take 1 tablet by mouth 2 times daily 180 tablet 3    LINZESS 72 MCG CAPS capsule Take 1 capsule by mouth daily      rifaximin (XIFAXAN) 550 MG tablet Take 550 mg by mouth 2 times daily      amoxicillin (AMOXIL) 875 MG tablet Take 1 tablet by mouth 2 times daily for 10 days 20 tablet 0    Azelastine-Fluticasone 137-50 MCG/ACT SUSP 1 spray by Nasal route daily 1 Bottle 1    montelukast (SINGULAIR) 10 MG tablet Take 1 tablet by mouth daily 90 tablet 1    atorvastatin (LIPITOR) 40 MG tablet Take 1 tablet by mouth every day 90 tablet 3    blood glucose test strips (ACCU-CHEK COMFORT CURVE) strip Accu-Chek guide me glucometer strips up to 3 times per day.  300 each 3    metFORMIN (GLUCOPHAGE) 1000 MG tablet 1 bid 180 tablet 0    insulin aspart (NOVOLOG FLEXPEN) 100 UNIT/ML injection pen in acute distress. Appearance: He is well-developed. HENT:      Head: Normocephalic and atraumatic. Right Ear: Tympanic membrane, ear canal and external ear normal. No drainage. No middle ear effusion. Tympanic membrane is not bulging. Tympanic membrane has normal mobility. Left Ear: Ear canal and external ear normal. No drainage. No middle ear effusion. Tympanic membrane is not bulging. Tympanic membrane has decreased mobility. Nose: Nasal deformity (Nasal dorsum shifted from left to right) and septal deviation present. No mucosal edema or rhinorrhea. Mouth/Throat:      Lips: Pink. Mouth: Mucous membranes are moist.      Tongue: No lesions. Palate: No mass. Pharynx: Uvula midline. Eyes:      Pupils: Pupils are equal, round, and reactive to light. Neck:      Musculoskeletal: Full passive range of motion without pain. Thyroid: No thyroid mass or thyromegaly. Trachea: Trachea and phonation normal.   Cardiovascular:      Pulses: Normal pulses. Pulmonary:      Effort: Pulmonary effort is normal. No accessory muscle usage or respiratory distress. Breath sounds: No stridor. Lymphadenopathy:      Head:      Right side of head: No submental or submandibular adenopathy. Left side of head: No submental or submandibular adenopathy. Cervical: No cervical adenopathy. Right cervical: No superficial, deep or posterior cervical adenopathy. Left cervical: No superficial, deep or posterior cervical adenopathy. Skin:     General: Skin is warm and dry. Neurological:      Mental Status: He is alert and oriented to person, place, and time. Cranial Nerves: No cranial nerve deficit. Coordination: Coordination normal.      Gait: Gait normal.   Psychiatric:         Thought Content:  Thought content normal.           Procedure     Flexible Laryngoscopy    Pre op: globus  Post op: same, GERD  Procedure : Flexible Laryngoscopy  Surgeon: Natty Longoria Loyd Garcia DO  Anesthesia: Afrin with 4% lidocaine  Indication: Laryngeal mirror examination was not tolerated due to gag reflex  Description:  The scope was passed along the floor of the left naris to the level of the larynx. There was no evidence of concerning masses or lesions of the base of tongue, vallecula, epiglottis, aryepiglottic folds, arytenoids, false vocal folds, true vocal folds, or pyriform sinuses. True vocal folds exhibited symmetric motion bilaterally without evidence of paralysis or paresis. The scope was removed. The patient tolerated the procedure without difficulty. There were no complications. Pertinent findings: post cricoid thickening and erythema consistent with reflux related changes, no evidence of mass or lesion        Assessment and Plan     1. Dysfunction of left eustachian tube  -Improving  -Continue Dymista-increase to 1 spray each side twice a day  -Continue Singulair    2. Tinnitus of left ear  -Possibly related to underlying negative pressure versus secondary to sensorineural hearing loss  -If ringing persists after resolution of eustachian tube dysfunction we will proceed with audio at that time to assess for underlying hearing loss  3. Allergic rhinitis, unspecified seasonality, unspecified trigger  -Improving, continue Dymista and Singulair    4. Gastroesophageal reflux disease, unspecified whether esophagitis present  -On Protonix but does not feel it can controling his reflux. Previously reports better control with Pepcid  -Restart Pepcid  - famotidine (PEPCID) 20 MG tablet; Take 1 tablet by mouth 2 times daily  Dispense: 180 tablet; Refill: 3    5. Globus sensation  -Secondary to uncontrolled reflux  -Restart Pepcid      Lars Moreno DO  3/19/21      Portions of this note were dictated using Dragon.  There may be linguistic errors secondary to the use of this program.

## 2021-03-29 DIAGNOSIS — F51.02 ADJUSTMENT INSOMNIA: ICD-10-CM

## 2021-03-30 RX ORDER — ZOLPIDEM TARTRATE 10 MG/1
TABLET ORAL
Qty: 30 TABLET | Refills: 1 | Status: SHIPPED | OUTPATIENT
Start: 2021-03-30 | End: 2021-04-29

## 2021-03-30 NOTE — TELEPHONE ENCOUNTER
Refill Request - Controlled Substance    Last Seen: 3/4/2021    Last Written: 2/1/2021    Last UDS: 12/19/2017    Med Agreement Signed On:    Next Appointment: 4/21/2021    Future appointment scheduled    Requested Prescriptions     Pending Prescriptions Disp Refills    zolpidem (AMBIEN) 10 MG tablet [Pharmacy Med Name: ZOLPIDEM TARTRATE 10 MG TABLET] 30 tablet 1     Sig: TAKE 1 TABLET BY MOUTH AT BEDTIME

## 2021-04-08 DIAGNOSIS — F51.02 ADJUSTMENT INSOMNIA: Primary | ICD-10-CM

## 2021-04-08 RX ORDER — DIAZEPAM 5 MG/1
TABLET ORAL
Qty: 50 TABLET | Refills: 0 | Status: SHIPPED | OUTPATIENT
Start: 2021-04-08 | End: 2021-05-21

## 2021-04-21 ENCOUNTER — VIRTUAL VISIT (OUTPATIENT)
Dept: FAMILY MEDICINE CLINIC | Age: 61
End: 2021-04-21
Payer: COMMERCIAL

## 2021-04-21 DIAGNOSIS — F41.9 ANXIETY: ICD-10-CM

## 2021-04-21 DIAGNOSIS — F51.02 ADJUSTMENT INSOMNIA: Primary | ICD-10-CM

## 2021-04-21 PROCEDURE — 99212 OFFICE O/P EST SF 10 MIN: CPT | Performed by: FAMILY MEDICINE

## 2021-04-21 ASSESSMENT — ENCOUNTER SYMPTOMS
SHORTNESS OF BREATH: 0
COUGH: 0

## 2021-04-21 NOTE — PROGRESS NOTES
Subjective:      Patient ID: Tati Epps is a 56 y.o. male. HPI  Patient in for recheck on insomnia/anxiety. Overall he is doing very well and it is mostly because he is now taking pancreatic enzymes from a specialisthis CT scan from a month or 2 ago showed moderate to severe atrophy of the pancreashe is currently on Creon and feels so much better. He is even putting on some weight. This was the video visit with the patient due to the ongoing virus in the community-he has at [facility] have permission for the callhe is alone on the call and I am in my office at [facility]    Prior to Visit Medications :  Medication diazePAM (VALIUM) 5 MG tablet, Sig 1 twice daily as needed anxiety/sleep    must last 1 month, Taking? Yes, Authorizing Provider Miguel Snow, DO    Medication zolpidem (AMBIEN) 10 MG tablet, Sig TAKE 1 TABLET BY MOUTH AT BEDTIME, Taking? Yes, Authorizing Provider Miguel Snow, DO    Medication famotidine (PEPCID) 20 MG tablet, Sig Take 1 tablet by mouth 2 times daily, Taking? Yes, Authorizing Provider Ricco Christiansen, DO    Medication LINZESS 72 MCG CAPS capsule, Sig Take 1 capsule by mouth daily, Taking? Yes, Authorizing Provider Historical Provider, MD    Medication rifaximin (XIFAXAN) 550 MG tablet, Sig Take 550 mg by mouth 2 times daily, Taking? Yes, Authorizing Provider Historical Provider, MD    Medication Azelastine-Fluticasone 137-50 MCG/ACT SUSP, Sig 1 spray by Nasal route daily, Taking? Yes, Authorizing Provider CAMILA James    Medication montelukast (SINGULAIR) 10 MG tablet, Sig Take 1 tablet by mouth daily, Taking? Yes, Authorizing Provider CAMILA James    Medication atorvastatin (LIPITOR) 40 MG tablet, Sig Take 1 tablet by mouth every day, Taking? Yes, Authorizing Provider Miguel Snow, DO    Medication blood glucose test strips (ACCU-CHEK COMFORT CURVE) strip, Sig Accu-Chek guide me glucometer strips up to 3 times per day., Taking?  Yes, Authorizing Provider Rosanne Prado Edy, DO    Medication metFORMIN (GLUCOPHAGE) 1000 MG tablet, Sig 1 bid, Taking? Yes, Authorizing Provider Miguel Snow, DO    Medication insulin aspart (NOVOLOG FLEXPEN) 100 UNIT/ML injection pen, Sig Inject up to 15 units subcutaneously twice a day per sliding scale, Taking? Yes, Authorizing Provider Miguel Snow, DO    Medication insulin glargine (LANTUS SOLOSTAR) 100 UNIT/ML injection pen, Sig Inject 40 Units into the skin 2 times daily  Patient taking differently: Inject 40 Units into the skin 2 times daily Pt states he takes 20 Units in AM and 25 Units in PM, Taking? Yes, Authorizing Provider Joseph Soto, APRN - CNP    Medication blood glucose test strips (ACCU-CHEK GUIDE) strip, Sig 1 each by In Vitro route daily As needed. , Taking? Yes, Authorizing Provider Historical Provider, MD    Medication Insulin Pen Needle (B-D UF III MINI PEN NEEDLES) 31G X 5 MM MISC, Sig Use up to 3 times a day, Taking? Yes, Authorizing Provider Miguel Snow, DO    Medication Probiotic Product (PROBIOTIC-10 ULTIMATE PO), Sig Take by mouth, Taking? Yes, Authorizing Provider Historical Provider, MD    Medication dicyclomine (BENTYL) 10 MG capsule, Sig , Taking? Yes, Authorizing Provider Historical Provider, MD    Medication aspirin 81 MG chewable tablet, Sig Take 81 mg by mouth daily, Taking?  Yes, Authorizing Provider Historical Provider, MD      Past Medical History:  No date: Chronic back pain  No date: Chronic pain  No date: Diverticulitis  No date: DVT (deep venous thrombosis) (HCC)  No date: Hyperlipidemia  No date: Irritable bowel syndrome  No date: Pain medication agreement signed      Comment:  discontued care due eye surgery/imbilical hernia surgery               -  No date: Type II or unspecified type diabetes mellitus without   mention of complication, not stated as uncontrolled  No date: Umbilical hernia      Comment:  having surgery 11/23        Review of Systems    Review of Systems   Constitutional: Negative for fever.   Respiratory: Negative for cough and shortness of breath. Gastrointestinal:        See HPI   Psychiatric/Behavioral: Positive for sleep disturbance. The patient is nervous/anxious. Objective:   Physical Exam      Physical Exam  Constitutional:       Appearance: Normal appearance. Neurological:      Mental Status: He is alert and oriented to person, place, and time. Psychiatric:         Mood and Affect: Mood normal.         Behavior: Behavior normal.         Thought Content: Thought content normal.         Judgment: Judgment normal.         Assessment:       Diagnosis Orders   1. Adjustment insomnia     2. Anxiety           Plan:      Edna Ferrari was seen today for 3 month follow-up. Diagnoses and all orders for this visit:    Adjustment insomnia  Continue meds as needed and reduce when possible  Anxiety  As above    See me 3 to 4 months in the office.      Miguel Snow, DO

## 2021-04-21 NOTE — PATIENT INSTRUCTIONS
Adjustment insomnia  Continue meds as needed and reduce when possible  Anxiety  As above    See me 3 to 4 months in the office.      Miguel Snow, DO

## 2021-05-06 ENCOUNTER — TELEPHONE (OUTPATIENT)
Dept: FAMILY MEDICINE CLINIC | Age: 61
End: 2021-05-06

## 2021-05-06 NOTE — TELEPHONE ENCOUNTER
Pt called on 4/30/21 requesting that records be sent to the [facility] for an appt on 5/7/21 with Dr. Kathy Caceres. I had him come in and fill out a DIEGO with the fax number he supplied ([phone]) and sent it to [phone] late that day. Irlanda Christian, practice mgr, had me jose antonio it expedite and note on the fax cover sheet that if it couldn't be processed by EOD on 5/3/21. Called on 5/3/21in the early afternoon. Lara Regalado said that the request had not been entered as of then. He had me fax it again to [phone]. Called again on 5/3/21 and couldn't be transferred to Lara Regalado. S/W fml who said she would jose antonio it expedite. [facility] She said that the records had been faxed on 5/4/21. Called Dr. Krysten Wesley office and spoke with Tc Rajan. She said that the number the pt had given us was incorrect. She said that number is all over the paperwork that pts are given, but goes to their call center. She said they don't have access to that system. She gave me [phone]. I called [facility] and spoke with Neil Moss. She said to send over the DIEGO with the corrected fax number and the request id# [id number] and it would be taken care of today. Sent fax and advised Tc Rajan at Dr. Krysten Wesley office that this had been done.

## 2021-05-10 ENCOUNTER — TELEPHONE (OUTPATIENT)
Dept: FAMILY MEDICINE CLINIC | Age: 61
End: 2021-05-10

## 2021-05-19 ENCOUNTER — TELEPHONE (OUTPATIENT)
Dept: FAMILY MEDICINE CLINIC | Age: 61
End: 2021-05-19

## 2021-05-19 NOTE — TELEPHONE ENCOUNTER
Pt calling asking if Dr. Supriya Zamudio would be able to write pt the letter stating that when he takes his valium at night that it wears off by the morning. Pt stated that he started a new job and needed it asap.  Pt is asking if we can fax it to [phone]

## 2021-05-21 DIAGNOSIS — F51.02 ADJUSTMENT INSOMNIA: ICD-10-CM

## 2021-05-21 RX ORDER — DIAZEPAM 5 MG/1
TABLET ORAL
Qty: 50 TABLET | Refills: 0 | Status: SHIPPED | OUTPATIENT
Start: 2021-05-21 | End: 2021-07-06 | Stop reason: SDUPTHER

## 2021-05-21 NOTE — TELEPHONE ENCOUNTER
.  Last office visit 4/21/2021     Last written 4-8-21 50 with 0      Next office visit scheduled Visit date not found    Requested Prescriptions     Pending Prescriptions Disp Refills    diazePAM (VALIUM) 5 MG tablet [Pharmacy Med Name: DIAZEPAM 5 MG TABLET] 50 tablet 0     Sig: TAKE 1 TABLET BY MOUTH TWICE A DAY PA *MUST LAST 1 MONTH**

## 2021-07-06 DIAGNOSIS — F51.02 ADJUSTMENT INSOMNIA: ICD-10-CM

## 2021-07-06 RX ORDER — DIAZEPAM 5 MG/1
TABLET ORAL
Qty: 50 TABLET | Refills: 0 | Status: SHIPPED | OUTPATIENT
Start: 2021-07-06 | End: 2021-09-02

## 2021-07-20 DIAGNOSIS — J30.2 SEASONAL ALLERGIES: ICD-10-CM

## 2021-07-21 RX ORDER — AZELASTINE HYDROCHLORIDE, FLUTICASONE PROPIONATE 137; 50 UG/1; UG/1
SPRAY, METERED NASAL
Qty: 1 BOTTLE | Refills: 1 | Status: SHIPPED | OUTPATIENT
Start: 2021-07-21 | End: 2021-10-22

## 2021-08-12 NOTE — TELEPHONE ENCOUNTER
Refill Request     Last Seen: Last Seen Department: 4/21/2021  Last Seen by PCP: 4/21/2021    Last Written: 2/9/21    Next Appointment:   Future Appointments   Date Time Provider Barby Zepeda   8/30/2021  4:00 PM DO LUIS M Jean Baptiste [place] - [facility]       Future appointment scheduled      Requested Prescriptions     Pending Prescriptions Disp Refills    metFORMIN (GLUCOPHAGE) 1000 MG tablet [Pharmacy Med Name: METFORMIN HCL 1,000 MG TABLET] 180 tablet 0     Sig: Take 1 tablet by mouth twice a day

## 2021-08-19 DIAGNOSIS — J30.2 SEASONAL ALLERGIES: ICD-10-CM

## 2021-08-19 RX ORDER — MONTELUKAST SODIUM 10 MG/1
TABLET ORAL
Qty: 90 TABLET | Refills: 1 | Status: SHIPPED | OUTPATIENT
Start: 2021-08-19

## 2021-08-19 NOTE — TELEPHONE ENCOUNTER
Refill Request     Last Seen: Last Seen Department:  4/21/21  Last Seen by PCP: 4/21/21    Last Written: 3/15/21 90 tablet 1 refill     Next Appointment: 8/30/21     Future Appointments   Date Time Provider Barby Zepeda   8/30/2021  4:00 PM DO LUIS M Jean Baptiste  [place] - [facility]       Future appointment scheduled      Requested Prescriptions     Pending Prescriptions Disp Refills    montelukast (SINGULAIR) 10 MG tablet [Pharmacy Med Name: MONTELUKAST SOD 10 MG TABLET] 90 tablet 1     Sig: TAKE 1 TABLET BY MOUTH EVERY DAY

## 2021-08-30 ENCOUNTER — VIRTUAL VISIT (OUTPATIENT)
Dept: FAMILY MEDICINE CLINIC | Age: 61
End: 2021-08-30
Payer: COMMERCIAL

## 2021-08-30 DIAGNOSIS — F41.9 ANXIETY: Primary | ICD-10-CM

## 2021-08-30 DIAGNOSIS — F51.02 ADJUSTMENT INSOMNIA: ICD-10-CM

## 2021-08-30 DIAGNOSIS — K86.89 PANCREATIC INSUFFICIENCY: ICD-10-CM

## 2021-08-30 PROCEDURE — 99422 OL DIG E/M SVC 11-20 MIN: CPT | Performed by: FAMILY MEDICINE

## 2021-08-30 ASSESSMENT — ENCOUNTER SYMPTOMS
DIARRHEA: 1
CHEST TIGHTNESS: 0
SHORTNESS OF BREATH: 0
BLOOD IN STOOL: 1
ABDOMINAL PAIN: 1

## 2021-08-30 NOTE — PATIENT INSTRUCTIONS
Anxiety  Continue meds as needed and try to reduce when possiblestay as active as possible  Adjustment insomnia  As above  Pancreatic insufficiency  Continue José will do referral for Dr. Emanuel Dupont at [facility]. See me 3 months in the office.         Miguel Snow, DO

## 2021-08-30 NOTE — PROGRESS NOTES
Subjective:      Patient ID: Amilcar Garcia is a 56 y.o. male. HPI  Patient in for video visit for several medical issues. This is due to the ongoing virus in the community. He has at [facility] is alone on the callwe have permission for the call and I am in my office at [facility]. Anxiety/insomniahe takes the Valium typically mid afternoon and then again about an hour before bedtime which helps him relax his situation and be able to deal with work and get to sleep at night. He is asking for referral for a Dr. Denise Leach at [facility] for GI issue. He was seeing a GI specialist in [facility] but the ride back and forth is getting to be a bit much. The doctor in [facility] has him on Creon before meals and snacks. His fax number [phone]. Prior to Visit Medications :  Medication montelukast (SINGULAIR) 10 MG tablet, Sig TAKE 1 TABLET BY MOUTH EVERY DAY, Taking? Yes, Authorizing Provider Miguel Snow, DO    Medication metFORMIN (GLUCOPHAGE) 1000 MG tablet, Sig Take 1 tablet by mouth twice a day, Taking? Yes, Authorizing Provider Miguel Snow, DO    Medication Azelastine-Fluticasone 137-50 MCG/ACT SUSP, Sig SPRAY 1 SPRAY INTO EACH NOSTRIL EVERY DAY, Taking? Yes, Authorizing Provider Miguel Snow, DO    Medication famotidine (PEPCID) 20 MG tablet, Sig Take 1 tablet by mouth 2 times daily, Taking? Yes, Authorizing Provider Kathia Anthony, DO    Medication LINZESS 72 MCG CAPS capsule, Sig Take 1 capsule by mouth daily, Taking? Yes, Authorizing Provider Historical Provider, MD    Medication rifaximin (XIFAXAN) 550 MG tablet, Sig Take 550 mg by mouth 2 times daily, Taking? Yes, Authorizing Provider Historical Provider, MD    Medication atorvastatin (LIPITOR) 40 MG tablet, Sig Take 1 tablet by mouth every day, Taking? Yes, Authorizing Provider Miguel Snow, DO    Medication blood glucose test strips (ACCU-CHEK COMFORT CURVE) strip, Sig Accu-Chek guide me glucometer strips up to 3 times per day., Taking?  Yes, Authorizing Provider Sweta Snow, DO    Medication insulin aspart (NOVOLOG FLEXPEN) 100 UNIT/ML injection pen, Sig Inject up to 15 units subcutaneously twice a day per sliding scale, Taking? Yes, Authorizing Provider Miguel Snow, DO    Medication insulin glargine (LANTUS SOLOSTAR) 100 UNIT/ML injection pen, Sig Inject 40 Units into the skin 2 times daily  Patient taking differently: Inject 40 Units into the skin 2 times daily Pt states he takes 20 Units in AM and 25 Units in PM, Taking? Yes, Authorizing Provider Rosalind Carroll, APRN - CNP    Medication blood glucose test strips (ACCU-CHEK GUIDE) strip, Sig 1 each by In Vitro route daily As needed. , Taking? Yes, Authorizing Provider Historical Provider, MD    Medication Insulin Pen Needle (B-D UF III MINI PEN NEEDLES) 31G X 5 MM MISC, Sig Use up to 3 times a day, Taking? Yes, Authorizing Provider Miguel Snow, DO    Medication Probiotic Product (PROBIOTIC-10 ULTIMATE PO), Sig Take by mouth, Taking? Yes, Authorizing Provider Historical Provider, MD    Medication dicyclomine (BENTYL) 10 MG capsule, Sig , Taking? Yes, Authorizing Provider Historical MD Mara    Medication aspirin 81 MG chewable tablet, Sig Take 81 mg by mouth daily, Taking? Yes, Authorizing Provider Historical Provider, MD      Past Medical History:  No date: Chronic back pain  No date: Chronic pain  No date: Diverticulitis  No date: DVT (deep venous thrombosis) ([facility])  No date: Hyperlipidemia  No date: Irritable bowel syndrome  No date: Pain medication agreement signed      Comment:  discontued care due eye surgery/imbilical hernia surgery               -  No date: Type II or unspecified type diabetes mellitus without   mention of complication, not stated as uncontrolled  No date: Umbilical hernia      Comment:  having surgery 11/23        Review of Systems    Review of Systems   Constitutional: Negative for fever. HENT: Negative for congestion and postnasal drip.     Respiratory: Negative for chest tightness and shortness of breath. Gastrointestinal: Positive for abdominal pain, blood in stool and diarrhea. Musculoskeletal: Positive for arthralgias. Psychiatric/Behavioral: Positive for sleep disturbance. The patient is nervous/anxious. Objective:   Physical Exam      Physical Exam  Constitutional:       General: He is not in acute distress. Appearance: Normal appearance. He is not ill-appearing. Neurological:      Mental Status: He is alert and oriented to person, place, and time. Psychiatric:         Mood and Affect: Mood normal.         Behavior: Behavior normal.         Thought Content: Thought content normal.         Judgment: Judgment normal.         Assessment:      1. Anxiety    2. Adjustment insomnia    3. Pancreatic insufficiency            Plan:      Angie Donato was seen today for 3 month follow-up. Diagnoses and all orders for this visit:    Anxiety  Continue meds as needed and try to reduce when possiblestay as active as possible  Adjustment insomnia  As above  Pancreatic insufficiency  Continue José will do referral for Dr. Boni Alcazar at [facility]. See me 3 months in the office.         Miguel Snow, DO

## 2021-08-30 NOTE — TELEPHONE ENCOUNTER
Refill Request - Controlled Substance    Last Seen Department: 8/30/2021    Last Seen by PCP: 8/30/2021    Last Written: 7/6/21 50 tablet 0 refills    Last UDS: 12/19/17     Med Agreement Signed On: 6/5/15     Next Appointment: n/a    Future appointment scheduled    Requested Prescriptions     Pending Prescriptions Disp Refills    diazePAM (VALIUM) 5 MG tablet [Pharmacy Med Name: DIAZEPAM 5 MG TABLET] 50 tablet 0     Sig: TAKE 1 TABLET BY MOUTH TWICE A DAY **MUST LAST 1 MONTH

## 2021-09-02 RX ORDER — DIAZEPAM 5 MG/1
TABLET ORAL
Qty: 50 TABLET | Refills: 0 | Status: SHIPPED | OUTPATIENT
Start: 2021-09-02 | End: 2021-10-05 | Stop reason: SDUPTHER

## 2021-09-11 ENCOUNTER — HOSPITAL ENCOUNTER (OUTPATIENT)
Dept: CT IMAGING | Age: 61
Discharge: HOME | End: 2021-09-11
Payer: COMMERCIAL

## 2021-09-11 ENCOUNTER — HOSPITAL ENCOUNTER (OUTPATIENT)
Age: 61
Discharge: HOME | End: 2021-09-11
Payer: COMMERCIAL

## 2021-09-11 DIAGNOSIS — R10.30 LOWER ABDOMINAL PAIN: ICD-10-CM

## 2021-09-11 LAB
CREAT SERPL-MCNC: 1.1 MG/DL (ref 0.8–1.3)
GFR AFRICAN AMERICAN: >60
GFR NON-AFRICAN AMERICAN: >60

## 2021-09-11 PROCEDURE — 36415 COLL VENOUS BLD VENIPUNCTURE: CPT

## 2021-09-11 PROCEDURE — 6360000004 HC RX CONTRAST MEDICATION: Performed by: INTERNAL MEDICINE

## 2021-09-11 PROCEDURE — 74177 CT ABD & PELVIS W/CONTRAST: CPT

## 2021-09-11 PROCEDURE — 82565 ASSAY OF CREATININE: CPT

## 2021-09-11 RX ADMIN — IOPAMIDOL 75 ML: 755 INJECTION, SOLUTION INTRAVENOUS at 08:24

## 2021-09-11 RX ADMIN — IOHEXOL 50 ML: 240 INJECTION, SOLUTION INTRATHECAL; INTRAVASCULAR; INTRAVENOUS; ORAL at 08:24

## 2021-10-03 ENCOUNTER — PATIENT MESSAGE (OUTPATIENT)
Dept: FAMILY MEDICINE CLINIC | Age: 61
End: 2021-10-03

## 2021-10-03 DIAGNOSIS — F51.02 ADJUSTMENT INSOMNIA: ICD-10-CM

## 2021-10-04 NOTE — TELEPHONE ENCOUNTER
..  Refill Request - Controlled Substance    Last Seen Department: 8/30/2021  Last Seen by PCP: 8/30/2021    Last Written: 9-2-21 50 with 0     Last UDS: 12-19-17    Med Agreement Signed On: 1-4-18    Next Appointment: 11/30/2021    Future appointment scheduled    Requested Prescriptions     Pending Prescriptions Disp Refills    diazePAM (VALIUM) 5 MG tablet 50 tablet 0     Sig: TAKE 1 TABLET BY MOUTH TWICE A DAY **MUST LAST 1 MONTH

## 2021-10-04 NOTE — TELEPHONE ENCOUNTER
From: Alex Saavedra  To: Melo James DO  Sent: 10/3/2021 5:13 PM EDT  Subject: Prescription Question    Dr Vishal Sauceda,   Is it possible to get one refill on my Diazapam , it always lasts the 30 day requirement but I always have a two or three day lapse between refills that causes me issues Im out since Saturday just asking thx

## 2021-10-05 RX ORDER — DIAZEPAM 5 MG/1
TABLET ORAL
Qty: 50 TABLET | Refills: 0 | Status: SHIPPED | OUTPATIENT
Start: 2021-10-05 | End: 2021-11-04 | Stop reason: SDUPTHER

## 2021-10-08 ENCOUNTER — HOSPITAL ENCOUNTER (OUTPATIENT)
Age: 61
Discharge: HOME | End: 2021-10-08
Payer: COMMERCIAL

## 2021-10-08 LAB
A/G RATIO: 1.3 (ref 1.1–2.2)
ALBUMIN SERPL-MCNC: 3.8 G/DL (ref 3.4–5)
ALP BLD-CCNC: 56 U/L (ref 40–129)
ALT SERPL-CCNC: 16 U/L (ref 10–40)
ANION GAP SERPL CALCULATED.3IONS-SCNC: 15 MMOL/L (ref 3–16)
AST SERPL-CCNC: 14 U/L (ref 15–37)
BASOPHILS ABSOLUTE: 0.1 K/UL (ref 0–0.2)
BASOPHILS RELATIVE PERCENT: 0.9 %
BILIRUB SERPL-MCNC: 0.5 MG/DL (ref 0–1)
BUN BLDV-MCNC: 23 MG/DL (ref 7–20)
C-REACTIVE PROTEIN, HIGH SENSITIVITY: 0.84 MG/L (ref 0.16–3)
CALCIUM SERPL-MCNC: 9 MG/DL (ref 8.3–10.6)
CHLORIDE BLD-SCNC: 99 MMOL/L (ref 99–110)
CO2: 22 MMOL/L (ref 21–32)
CREAT SERPL-MCNC: 0.9 MG/DL (ref 0.8–1.3)
EOSINOPHILS ABSOLUTE: 0 K/UL (ref 0–0.6)
EOSINOPHILS RELATIVE PERCENT: 0.5 %
GFR AFRICAN AMERICAN: >60
GFR NON-AFRICAN AMERICAN: >60
GLOBULIN: 2.9 G/DL
GLUCOSE BLD-MCNC: 279 MG/DL (ref 70–99)
HCT VFR BLD CALC: 38.1 % (ref 40.5–52.5)
HEMOGLOBIN: 12.8 G/DL (ref 13.5–17.5)
LYMPHOCYTES ABSOLUTE: 2.9 K/UL (ref 1–5.1)
LYMPHOCYTES RELATIVE PERCENT: 30.1 %
MCH RBC QN AUTO: 29.9 PG (ref 26–34)
MCHC RBC AUTO-ENTMCNC: 33.7 G/DL (ref 31–36)
MCV RBC AUTO: 88.8 FL (ref 80–100)
MONOCYTES ABSOLUTE: 0.6 K/UL (ref 0–1.3)
MONOCYTES RELATIVE PERCENT: 6.5 %
NEUTROPHILS ABSOLUTE: 6 K/UL (ref 1.7–7.7)
NEUTROPHILS RELATIVE PERCENT: 62 %
PDW BLD-RTO: 14.2 % (ref 12.4–15.4)
PLATELET # BLD: 192 K/UL (ref 135–450)
PMV BLD AUTO: 10.5 FL (ref 5–10.5)
POTASSIUM SERPL-SCNC: 3.5 MMOL/L (ref 3.5–5.1)
RBC # BLD: 4.29 M/UL (ref 4.2–5.9)
SODIUM BLD-SCNC: 136 MMOL/L (ref 136–145)
TOTAL PROTEIN: 6.7 G/DL (ref 6.4–8.2)
WBC # BLD: 9.7 K/UL (ref 4–11)

## 2021-10-08 PROCEDURE — 80053 COMPREHEN METABOLIC PANEL: CPT

## 2021-10-08 PROCEDURE — 86480 TB TEST CELL IMMUN MEASURE: CPT

## 2021-10-08 PROCEDURE — 36415 COLL VENOUS BLD VENIPUNCTURE: CPT

## 2021-10-08 PROCEDURE — 85025 COMPLETE CBC W/AUTO DIFF WBC: CPT

## 2021-10-08 PROCEDURE — 86141 C-REACTIVE PROTEIN HS: CPT

## 2021-10-12 LAB
QUANTI TB GOLD PLUS: NEGATIVE
QUANTI TB1 MINUS NIL: 0 IU/ML (ref 0–0.34)
QUANTI TB2 MINUS NIL: 0 IU/ML (ref 0–0.34)
QUANTIFERON MITOGEN: >10 IU/ML
QUANTIFERON NIL: 0.03 IU/ML

## 2021-10-22 DIAGNOSIS — J30.2 SEASONAL ALLERGIES: ICD-10-CM

## 2021-10-22 RX ORDER — AZELASTINE HYDROCHLORIDE, FLUTICASONE PROPIONATE 137; 50 UG/1; UG/1
SPRAY, METERED NASAL
Qty: 1 EACH | Refills: 1 | Status: SHIPPED | OUTPATIENT
Start: 2021-10-22 | End: 2022-04-18

## 2021-10-22 NOTE — TELEPHONE ENCOUNTER
Refill Request     Last Seen: Last Seen Department: 8/30/2021  Last Seen by PCP: 8/30/2021    Last Written: 7/21/2021    Next Appointment:   Future Appointments   Date Time Provider Barby Zepeda   11/30/2021  4:00 PM DO LUIS M Jean Baptiste [place] - [facility]       Future appointment scheduled      Requested Prescriptions     Pending Prescriptions Disp Refills    Azelastine-Fluticasone 137-50 MCG/ACT SUSP [Pharmacy Med Name: AZELASTIN-FLUTIC 137-50MCG SPR] 1 each 1     Sig: SPRAY 1 SPRAY INTO EACH NOSTRIL EVERY DAY

## 2021-11-01 ENCOUNTER — TELEPHONE (OUTPATIENT)
Dept: FAMILY MEDICINE CLINIC | Age: 61
End: 2021-11-01

## 2021-11-01 NOTE — TELEPHONE ENCOUNTER
Would like to find out following. As for thais is this a recent injury? Did he fall, etc.. OR all related to back pain. Please see if we can schedule him sooner than December being diabetic and on long term prednisone with hyperglycemia at this time due to that and his pain. Do one of the partners have an opening to see patient and then have him follow up with Dr. Smita Miles when he returns?  Thanks, Radha Vilchis

## 2021-11-01 NOTE — TELEPHONE ENCOUNTER
Patient calling to report tailbone pain for the last 3 weeks. Pain is worse with sitting. He states the only way to get relief is laying back in recliner and taking pressure off tailbone. He reports the pain is an 8-9/10. He states he has a hx of L5-S1 vertebrae collapse which he has had a recent MRI at [facility] for his ulcerative colitis which he was told his vertebrae has moved and worsened. Back surgery has been brought up in the past but he is trying to avoid that. He does want to get evaluated for the tailbone pain and discuss options. He has been on Prednisone for 1 month, last dose was yesterday for his ulcerative colitis and he is Diabetic and his sugars have not been controlled. Yesterday he states was his highest readings 300-400 in which he did get down with his insulin to 115 and he reports fasting this morning it was 101. He is past due for his dm follow up. Please advise as I see first opening in Dec.

## 2021-11-03 DIAGNOSIS — F51.02 ADJUSTMENT INSOMNIA: ICD-10-CM

## 2021-11-03 NOTE — TELEPHONE ENCOUNTER
Refill Request - Controlled Substance    Last Seen Department: 8/30/2021  Last Seen by PCP: 8/30/2021    Last Written: 10/05/2021 #50    Last UDS: 12/19/17    Med Agreement Signed On: 01/04/2018    Next Appointment: 2/8/2022    Future appointment scheduled  PATIENT IS NOT REQUESTING AN EARLY FILL HE IS JUST CALLING IN EARLY SO IT WILL BE FILLED BY 11/5/2021    Requested Prescriptions     Pending Prescriptions Disp Refills    diazePAM (VALIUM) 5 MG tablet 50 tablet 0     Sig: TAKE 1 TABLET BY MOUTH TWICE A DAY **MUST LAST 1 MONTH

## 2021-11-04 RX ORDER — DIAZEPAM 5 MG/1
TABLET ORAL
Qty: 50 TABLET | Refills: 0 | Status: SHIPPED | OUTPATIENT
Start: 2021-11-04 | End: 2021-12-07 | Stop reason: SDUPTHER

## 2021-11-10 ENCOUNTER — TELEPHONE (OUTPATIENT)
Dept: FAMILY MEDICINE CLINIC | Age: 61
End: 2021-11-10

## 2021-11-10 NOTE — TELEPHONE ENCOUNTER
Per PDMP and Pharmacy dispense report he has only ever gotten 48 and it has never been 61 and I am sure Dr. Garrett had a reason for that since directions say must last a month reduce when possilble. Please be advised.  Thanks, Rosy

## 2021-11-10 NOTE — TELEPHONE ENCOUNTER
I can not per state data base Dr. Casper Guardado has only been refilling QTY of 50 as far back as April 2021. Directions are must last a month reduce when possilble. We can only refill what primary was prescribing. He would need to follow up with Dr. Fidelia Canela and discuss when he returns.  Thanks, Jsoselin Hutton

## 2021-11-10 NOTE — TELEPHONE ENCOUNTER
Pt said he was prescribed Diazepam  5mg. He's supposed to take 1 tablet 2 times a day. His script was filled for 50 pills, so therefore he will be short 10 pills. He usually gets 60 pills a month. Can you send in a script for 10 pills only? [facility].     Advise pt

## 2021-12-03 ENCOUNTER — TELEPHONE (OUTPATIENT)
Dept: FAMILY MEDICINE CLINIC | Age: 61
End: 2021-12-03

## 2021-12-03 NOTE — TELEPHONE ENCOUNTER
Patient calling back saying his Diazepam prescription that he is short #10 tablets every month and he takes it as prescribed Take 1 tablet by mouth twice a day must last 1 month. He says he takes 1 tablet twice a day every day and it does not last him the month. He said he has always been on quantity #60. I informed him of the phone call he had with our office back on 11/4/2021 and he said he remembers and the prescription was to be corrected at that time. Offered an appointment to discuss he said no he was just calling back as he was instructed and he is out of his Diazepam \"early again\" I told him I would route the message back.  He voiced understanding

## 2021-12-03 NOTE — TELEPHONE ENCOUNTER
Dr. Claudia López is not in the office this month. He had written for 50 tablets to last 30 days. He should not be taking the Valium twice a day every single day. That would mean a quantity of 60. The 50 tablets means that he should take a 1-2 times a day. We cannot recommend an adjustment at this time because Dr. Claudia López is not in the office.

## 2021-12-06 DIAGNOSIS — F51.02 ADJUSTMENT INSOMNIA: ICD-10-CM

## 2021-12-06 NOTE — TELEPHONE ENCOUNTER
.  Refill Request - Controlled Substance    Last Seen Department: 8/30/2021  Last Seen by PCP: 8/30/2021    Last Written: 11-4-21 50 with 0     Last UDS: 12-19-17    Med Agreement Signed On: 1-4-18    Next Appointment: 2/8/2022    Future appointment scheduled    Requested Prescriptions     Pending Prescriptions Disp Refills    diazePAM (VALIUM) 5 MG tablet 50 tablet 0     Sig: TAKE 1 TABLET BY MOUTH TWICE A DAY **MUST LAST 1 MONTH

## 2021-12-06 NOTE — TELEPHONE ENCOUNTER
Called and spoke with patient and informed him. Patient states that the bottle he has says to Take one tablet twice a day, must last 30 days. Patient state that he will wait for Dr. Frida Bourgeois to come back to discuss, and if he can only get #50, then he will take that. Patient states that he does need it filled because he has been out for a few days now.

## 2021-12-07 RX ORDER — DIAZEPAM 5 MG/1
TABLET ORAL
Qty: 50 TABLET | Refills: 0 | Status: SHIPPED | OUTPATIENT
Start: 2021-12-07 | End: 2022-01-07

## 2021-12-09 ENCOUNTER — TELEPHONE (OUTPATIENT)
Dept: FAMILY MEDICINE CLINIC | Age: 61
End: 2021-12-09

## 2021-12-09 DIAGNOSIS — L57.0 ACTINIC KERATOSES: Primary | ICD-10-CM

## 2021-12-09 DIAGNOSIS — L82.1 SEBORRHEIC KERATOSIS: ICD-10-CM

## 2021-12-09 DIAGNOSIS — E11.9 TYPE 2 DIABETES MELLITUS WITHOUT COMPLICATION, WITH LONG-TERM CURRENT USE OF INSULIN (HCC): ICD-10-CM

## 2021-12-09 DIAGNOSIS — Z79.4 TYPE 2 DIABETES MELLITUS WITHOUT COMPLICATION, WITH LONG-TERM CURRENT USE OF INSULIN (HCC): ICD-10-CM

## 2021-12-09 NOTE — TELEPHONE ENCOUNTER
----- Message from Henry Kwamemaryjo sent at 12/9/2021 10:15 AM EST -----  Subject: Referral Request    QUESTIONS   Reason for referral request? PT called in requesting a referral for a   dermatologist as his find found a rough patch on his shoulder and his   dermatologist is no longer with [facility]. PT would like to stay with [facility] if   you could please call him to discuss. Has the physician seen you for this condition before? No   Preferred Specialist (if applicable)? Pauline Castro you already have an appointment scheduled? No  Additional Information for Provider?   ---------------------------------------------------------------------------  --------------  CALL BACK INFO  What is the best way for the office to contact you? OK to leave message on   voicemail  Preferred Call Back Phone Number?  [phone]

## 2021-12-30 ENCOUNTER — HOSPITAL ENCOUNTER (OUTPATIENT)
Dept: MRI IMAGING | Age: 61
Discharge: HOME | End: 2021-12-30
Payer: COMMERCIAL

## 2021-12-30 ENCOUNTER — HOSPITAL ENCOUNTER (OUTPATIENT)
Age: 61
Discharge: HOME | End: 2021-12-30
Payer: COMMERCIAL

## 2021-12-30 DIAGNOSIS — K86.9 PANCREATIC LESION: ICD-10-CM

## 2021-12-30 LAB
CREAT SERPL-MCNC: 0.9 MG/DL (ref 0.8–1.3)
GFR AFRICAN AMERICAN: >60
GFR NON-AFRICAN AMERICAN: >60

## 2021-12-30 PROCEDURE — A9579 GAD-BASE MR CONTRAST NOS,1ML: HCPCS | Performed by: INTERNAL MEDICINE

## 2021-12-30 PROCEDURE — 82565 ASSAY OF CREATININE: CPT

## 2021-12-30 PROCEDURE — 6360000004 HC RX CONTRAST MEDICATION: Performed by: INTERNAL MEDICINE

## 2021-12-30 PROCEDURE — 74183 MRI ABD W/O CNTR FLWD CNTR: CPT

## 2021-12-30 RX ADMIN — GADOTERIDOL 17 ML: 279.3 INJECTION, SOLUTION INTRAVENOUS at 08:26

## 2022-01-07 DIAGNOSIS — F51.02 ADJUSTMENT INSOMNIA: ICD-10-CM

## 2022-01-07 RX ORDER — DIAZEPAM 5 MG/1
TABLET ORAL
Qty: 50 TABLET | Refills: 0 | Status: SHIPPED | OUTPATIENT
Start: 2022-01-07 | End: 2022-02-14

## 2022-01-07 NOTE — TELEPHONE ENCOUNTER
Refill Request - Controlled Substance    Last Seen Department: 8/30/2021  Last Seen by PCP: 8/30/21     Last Written: 12/7/21 50 tablet 0 refill     Last UDS: n/a     Med Agreement Signed On: 1/4/18    Next Appointment: 2/8/2022      Future appointment scheduled    Requested Prescriptions     Pending Prescriptions Disp Refills    diazePAM (VALIUM) 5 MG tablet [Pharmacy Med Name: DIAZEPAM 5 MG TABLET] 50 tablet 0     Sig: TAKE 1 TABLET BY MOUTH TWICE A DAY MUST LAST 30 DAYS

## 2022-02-11 DIAGNOSIS — F51.02 ADJUSTMENT INSOMNIA: ICD-10-CM

## 2022-02-14 RX ORDER — DIAZEPAM 5 MG/1
TABLET ORAL
Qty: 50 TABLET | Refills: 0 | Status: SHIPPED | OUTPATIENT
Start: 2022-02-14 | End: 2022-03-15

## 2022-02-17 NOTE — TELEPHONE ENCOUNTER
Refill Request     Last Seen: Last Seen Department: 8/30/2021  Last Seen by PCP: 8/30/2021    Last Written: 12/15/2021    Next Appointment:   Future Appointments   Date Time Provider Barby Zepeda   4/13/2022  3:00 PM DO LUIS M Jean Baptiste [place] - [place]       Future appointment scheduled      Requested Prescriptions     Pending Prescriptions Disp Refills    metFORMIN (GLUCOPHAGE) 1000 MG tablet 180 tablet 1     Sig: Take 1 tablet by mouth twice a day

## 2022-03-14 DIAGNOSIS — F51.02 ADJUSTMENT INSOMNIA: ICD-10-CM

## 2022-03-15 RX ORDER — DIAZEPAM 5 MG/1
TABLET ORAL
Qty: 45 TABLET | Refills: 0 | Status: SHIPPED | OUTPATIENT
Start: 2022-03-15 | End: 2022-04-19

## 2022-03-15 NOTE — TELEPHONE ENCOUNTER
Refill Request - Controlled Substance    Last Seen Department: 8/30/2021  Last Seen by PCP: 8/30/2021    Last Written: 2/14/2022, #50, 0 refill    Last UDS: 12/19/2017    Med Agreement Signed On: 1/4/2018    Next Appointment: 4/13/2022    Future appointment scheduled    Requested Prescriptions     Pending Prescriptions Disp Refills    diazePAM (VALIUM) 5 MG tablet [Pharmacy Med Name: DIAZEPAM 5 MG TABLET] 50 tablet 0     Sig: TAKE 1 TABLET BY MOUTH TWICE A DAY (AS NEEDED, MUST LAST 30 DAYS)

## 2022-04-17 DIAGNOSIS — J30.2 SEASONAL ALLERGIES: ICD-10-CM

## 2022-04-17 DIAGNOSIS — F51.02 ADJUSTMENT INSOMNIA: ICD-10-CM

## 2022-04-18 RX ORDER — AZELASTINE HYDROCHLORIDE, FLUTICASONE PROPIONATE 137; 50 UG/1; UG/1
SPRAY, METERED NASAL
Qty: 1 EACH | Refills: 2 | Status: SHIPPED | OUTPATIENT
Start: 2022-04-18

## 2022-04-18 NOTE — TELEPHONE ENCOUNTER
Refill Request     Last Seen: Last Seen Department: 8/30/2021  Last Seen by PCP: Visit date not found    Last Written: 10/22/21 with 1 refill  Next Appointment:   Future Appointments   Date Time Provider Barby Zepeda   5/4/2022  4:30 PM DO LUIS M Jean Baptiste [place] - [place]       Future appointment scheduled      Requested Prescriptions     Pending Prescriptions Disp Refills    Azelastine-Fluticasone 137-50 MCG/ACT SUSP [Pharmacy Med Name: AZELASTIN-FLUTIC 137-50MCG SPR] 1 each 2     Sig: SPRAY 1 SPRAY INTO EACH NOSTRIL EVERY DAY

## 2022-04-18 NOTE — TELEPHONE ENCOUNTER
Refill Request - Controlled Substance    Last Seen Department: 8/30/2021  Last Seen by PCP: 8/30/2021    Last Written: 3/15/22    Last UDS: 12/19/17    Med Agreement Signed On: N/A    Next Appointment: 4/17/2022    Future appointment scheduled    Requested Prescriptions     Pending Prescriptions Disp Refills    metFORMIN (GLUCOPHAGE) 1000 MG tablet [Pharmacy Med Name: METFORMIN HCL 1,000 MG TABLET] 180 tablet 0     Sig: TAKE 1 TABLET BY MOUTH TWICE A DAY    diazePAM (VALIUM) 5 MG tablet [Pharmacy Med Name: DIAZEPAM 5 MG TABLET] 45 tablet 0     Sig: TAKE 1 TABLET BY MOUTH TWICE A DAY (AS NEEDED, MUST LAST 30 DAYS)

## 2022-04-19 RX ORDER — DIAZEPAM 5 MG/1
TABLET ORAL
Qty: 45 TABLET | Refills: 0 | Status: SHIPPED | OUTPATIENT
Start: 2022-04-19 | End: 2022-05-23

## 2022-05-04 ENCOUNTER — OFFICE VISIT (OUTPATIENT)
Dept: FAMILY MEDICINE CLINIC | Age: 62
End: 2022-05-04
Payer: COMMERCIAL

## 2022-05-04 VITALS
WEIGHT: 211 LBS | DIASTOLIC BLOOD PRESSURE: 82 MMHG | OXYGEN SATURATION: 97 % | SYSTOLIC BLOOD PRESSURE: 120 MMHG | BODY MASS INDEX: 28.58 KG/M2 | HEART RATE: 88 BPM | HEIGHT: 72 IN

## 2022-05-04 DIAGNOSIS — F51.02 ADJUSTMENT INSOMNIA: ICD-10-CM

## 2022-05-04 DIAGNOSIS — Z79.4 TYPE 2 DIABETES MELLITUS WITHOUT COMPLICATION, WITH LONG-TERM CURRENT USE OF INSULIN (HCC): Primary | ICD-10-CM

## 2022-05-04 DIAGNOSIS — E11.9 TYPE 2 DIABETES MELLITUS WITHOUT COMPLICATION, WITH LONG-TERM CURRENT USE OF INSULIN (HCC): Primary | ICD-10-CM

## 2022-05-04 DIAGNOSIS — J30.2 SEASONAL ALLERGIES: ICD-10-CM

## 2022-05-04 LAB
CREATININE URINE POCT: 300
HBA1C MFR BLD: 7.4 %
MICROALBUMIN/CREAT 24H UR: 30 MG/G{CREAT}
MICROALBUMIN/CREAT UR-RTO: <30

## 2022-05-04 PROCEDURE — 3051F HG A1C>EQUAL 7.0%<8.0%: CPT | Performed by: FAMILY MEDICINE

## 2022-05-04 PROCEDURE — 99213 OFFICE O/P EST LOW 20 MIN: CPT | Performed by: FAMILY MEDICINE

## 2022-05-04 PROCEDURE — 83036 HEMOGLOBIN GLYCOSYLATED A1C: CPT | Performed by: FAMILY MEDICINE

## 2022-05-04 PROCEDURE — 82044 UR ALBUMIN SEMIQUANTITATIVE: CPT | Performed by: FAMILY MEDICINE

## 2022-05-04 SDOH — ECONOMIC STABILITY: TRANSPORTATION INSECURITY
IN THE PAST 12 MONTHS, HAS LACK OF TRANSPORTATION KEPT YOU FROM MEETINGS, WORK, OR FROM GETTING THINGS NEEDED FOR DAILY LIVING?: NO

## 2022-05-04 SDOH — ECONOMIC STABILITY: HOUSING INSECURITY: IN THE LAST 12 MONTHS, HOW MANY PLACES HAVE YOU LIVED?: 0

## 2022-05-04 SDOH — ECONOMIC STABILITY: HOUSING INSECURITY
IN THE LAST 12 MONTHS, WAS THERE A TIME WHEN YOU DID NOT HAVE A STEADY PLACE TO SLEEP OR SLEPT IN A SHELTER (INCLUDING NOW)?: NO

## 2022-05-04 SDOH — ECONOMIC STABILITY: FOOD INSECURITY: WITHIN THE PAST 12 MONTHS, YOU WORRIED THAT YOUR FOOD WOULD RUN OUT BEFORE YOU GOT MONEY TO BUY MORE.: NEVER TRUE

## 2022-05-04 SDOH — ECONOMIC STABILITY: TRANSPORTATION INSECURITY
IN THE PAST 12 MONTHS, HAS THE LACK OF TRANSPORTATION KEPT YOU FROM MEDICAL APPOINTMENTS OR FROM GETTING MEDICATIONS?: NO

## 2022-05-04 SDOH — ECONOMIC STABILITY: FOOD INSECURITY: WITHIN THE PAST 12 MONTHS, THE FOOD YOU BOUGHT JUST DIDN'T LAST AND YOU DIDN'T HAVE MONEY TO GET MORE.: NEVER TRUE

## 2022-05-04 SDOH — ECONOMIC STABILITY: INCOME INSECURITY: IN THE LAST 12 MONTHS, WAS THERE A TIME WHEN YOU WERE NOT ABLE TO PAY THE MORTGAGE OR RENT ON TIME?: NO

## 2022-05-04 ASSESSMENT — ENCOUNTER SYMPTOMS
ABDOMINAL PAIN: 1
RHINORRHEA: 0
COUGH: 0
SHORTNESS OF BREATH: 0
SORE THROAT: 0
BLOOD IN STOOL: 0
CONSTIPATION: 0
CHEST TIGHTNESS: 0

## 2022-05-04 ASSESSMENT — SOCIAL DETERMINANTS OF HEALTH (SDOH): HOW HARD IS IT FOR YOU TO PAY FOR THE VERY BASICS LIKE FOOD, HOUSING, MEDICAL CARE, AND HEATING?: NOT HARD AT ALL

## 2022-05-04 NOTE — PROGRESS NOTES
Subjective:      Patient ID: Kurt Jordan is a 59 y.o. male. HPI  Patient in for recheck on several medical issues. Diabetes-last A1c was 5.8. Seasonal allergies-does okay with the medication that he has. Insomnia- under control with his Valium that he does take twice a day. He denies any other issues to discuss. Review of Systems    Review of Systems   Constitutional: Negative for unexpected weight change. HENT: Negative for congestion, postnasal drip, rhinorrhea and sore throat. All rh   Eyes: Negative for visual disturbance. Respiratory: Negative for cough, chest tightness and shortness of breath. Cardiovascular: Negative for chest pain, palpitations and leg swelling. Gastrointestinal: Positive for abdominal pain. Negative for blood in stool and constipation. No gerd   Genitourinary: Positive for frequency. Negative for dysuria and hematuria. No nocturia   Musculoskeletal: Positive for arthralgias. Negative for myalgias. Skin: Negative for pallor and rash. Neurological: Negative for tremors, syncope and headaches. Psychiatric/Behavioral: Negative for sleep disturbance. The patient is not nervous/anxious. Objective:   Physical Exam      Physical Exam  Constitutional:       Appearance: He is well-developed. HENT:      Head: Normocephalic. Mouth/Throat:      Mouth: Mucous membranes are moist.      Pharynx: Oropharynx is clear. Eyes:      Conjunctiva/sclera: Conjunctivae normal.   Neck:      Thyroid: No thyromegaly. Vascular: No carotid bruit. Cardiovascular:      Rate and Rhythm: Normal rate and regular rhythm. Heart sounds: Normal heart sounds. Pulmonary:      Effort: Pulmonary effort is normal.      Breath sounds: Normal breath sounds. Abdominal:      General: There is no distension. Palpations: Abdomen is soft. There is no mass. Tenderness: There is no abdominal tenderness.       Comments: Scarring on the abdomen from previous surgeries. Musculoskeletal:         General: Normal range of motion. Cervical back: Neck supple. Right lower leg: No edema. Left lower leg: No edema. Lymphadenopathy:      Cervical: No cervical adenopathy. Skin:     General: Skin is warm and dry. Neurological:      Mental Status: He is alert and oriented to person, place, and time. Coordination: Coordination normal.      Gait: Gait normal.   Psychiatric:         Mood and Affect: Mood normal.         Behavior: Behavior normal.         Thought Content: Thought content normal.         Judgment: Judgment normal.         Assessment:       Diagnosis Orders   1. Type 2 diabetes mellitus without complication, with long-term current use of insulin ([facility])  POCT glycosylated hemoglobin (Hb A1C)    POCT microalbumin    HM DIABETES FOOT EXAM   2. Seasonal allergies     3. Adjustment insomnia           Plan:      Diagnoses and all orders for this visit:    Type 2 diabetes mellitus without complication, with long-term current use of insulin ([facility])  -     POCT glycosylated hemoglobin (Hb A1C)  -     POCT microalbumin  -     HM DIABETES FOOT EXAM  A1c today 7.4-no change in medication just try to reduce carbohydrates and stay as active as possible. Notify me of any persistent elevation of blood sugars. Seasonal allergies  Continue medications and notify me of any increase and uncontrollable allergy symptoms. Adjustment insomnia  Continue medications as needed    This is been approximately a 20-minute visit with the patient.     See me in 6 months     Bridger Basilio,

## 2022-05-21 DIAGNOSIS — F51.02 ADJUSTMENT INSOMNIA: ICD-10-CM

## 2022-05-23 RX ORDER — DIAZEPAM 5 MG/1
TABLET ORAL
Qty: 45 TABLET | Refills: 0 | Status: SHIPPED | OUTPATIENT
Start: 2022-05-23 | End: 2022-05-24 | Stop reason: SDUPTHER

## 2022-05-23 NOTE — TELEPHONE ENCOUNTER
Refill Request - Controlled Substance    CONFIRM preferrred pharmacy with the patient.      Last Seen Department: 5/4/2022  Last Seen by PCP: 5/4/2022    Last Written: 4/19/22 #45 no refills     Last UDS: 12/19/17    Med Agreement Signed On: 1/4/18    Next Appointment: 5/23/2022    Future appointment scheduled    Requested Prescriptions     Pending Prescriptions Disp Refills    diazePAM (VALIUM) 5 MG tablet [Pharmacy Med Name: DIAZEPAM 5 MG TABLET] 45 tablet 0     Sig: TAKE 1 TABLET BY MOUTH TWICE A DAY (AS NEEDED, MUST LAST 30 DAYS)

## 2022-05-24 DIAGNOSIS — F51.02 ADJUSTMENT INSOMNIA: ICD-10-CM

## 2022-05-24 RX ORDER — DIAZEPAM 5 MG/1
TABLET ORAL
Qty: 50 TABLET | Refills: 0 | Status: SHIPPED | OUTPATIENT
Start: 2022-05-24 | End: 2022-06-23

## 2022-06-22 DIAGNOSIS — F51.02 ADJUSTMENT INSOMNIA: ICD-10-CM

## 2022-06-23 RX ORDER — DIAZEPAM 5 MG/1
TABLET ORAL
Qty: 50 TABLET | Refills: 0 | Status: SHIPPED | OUTPATIENT
Start: 2022-06-23 | End: 2022-07-26 | Stop reason: SDUPTHER

## 2022-06-23 NOTE — TELEPHONE ENCOUNTER
Refill Request - Controlled Substance    CONFIRM preferrred pharmacy with the patient.      Last Seen Department: 5/4/2022  Last Seen by PCP: 5/4/2022    Last Written:   Metformin- 04/19/2022 180 Tablet 0 refills  Diazepam- 05/24/2022 50 tablet 0 Refills    Last UDS: 12/19/2017    Med Agreement Signed On: 01/04/2018    Next Appointment: 11/9/2022    Future appointment scheduled    Requested Prescriptions     Pending Prescriptions Disp Refills    metFORMIN (GLUCOPHAGE) 1000 MG tablet [Pharmacy Med Name: METFORMIN HCL 1,000 MG TABLET] 180 tablet 0     Sig: TAKE 1 TABLET BY MOUTH TWICE A DAY    diazePAM (VALIUM) 5 MG tablet [Pharmacy Med Name: DIAZEPAM 5 MG TABLET] 45 tablet 0     Sig: TAKE 1 TABLET BY MOUTH TWICE A DAY (AS NEEDED, MUST LAST 30 DAYS)

## 2022-07-22 ENCOUNTER — TELEPHONE (OUTPATIENT)
Dept: CARDIOLOGY CLINIC | Age: 62
End: 2022-07-22

## 2022-07-22 DIAGNOSIS — R42 DIZZINESS: Primary | ICD-10-CM

## 2022-07-22 DIAGNOSIS — R00.2 PALPITATION: ICD-10-CM

## 2022-07-22 DIAGNOSIS — I63.00 CEREBROVASCULAR ACCIDENT (CVA) DUE TO THROMBOSIS OF PRECEREBRAL ARTERY (HCC): ICD-10-CM

## 2022-07-22 DIAGNOSIS — E78.2 MIXED HYPERLIPIDEMIA: ICD-10-CM

## 2022-07-22 NOTE — TELEPHONE ENCOUNTER
Will send to P for doctor opinion. Can have pt utilize ER if symptoms warrant until pt can be seen. P please advise.

## 2022-07-22 NOTE — TELEPHONE ENCOUNTER
Pt calling in to I to get an appt with [facility]. Pt stated that he has been having some CP, dizziness, & light headed. Pt is scheduled at [facility]'s soonest next available on 08/19/22 at the [facility] in [place]. Pt stated that he would like to be seen sooner if at all possible to see what he can do to take care of this. High priority due to symptoms  Please advise & thank you.

## 2022-07-22 NOTE — TELEPHONE ENCOUNTER
Pt returned call to to [facility]. Relayed message to pt. Pt v/u. Putting central scheduling number in this message for pt to have for future reference. [phone].

## 2022-07-22 NOTE — TELEPHONE ENCOUNTER
Doris Olguin MD  to Me        12:31 PM   Lets have the patient come in for a stress test GXT Myoview and echocardiogramnext week. LMOM for pt to contact the office. Echo and stress test ordered stat so pt needs to call scheduling to get set up for STAT testing.

## 2022-07-26 DIAGNOSIS — F51.02 ADJUSTMENT INSOMNIA: ICD-10-CM

## 2022-07-26 RX ORDER — DIAZEPAM 5 MG/1
TABLET ORAL
Qty: 50 TABLET | Refills: 0 | Status: SHIPPED | OUTPATIENT
Start: 2022-07-26 | End: 2022-08-25

## 2022-07-26 NOTE — TELEPHONE ENCOUNTER
Refill Request     CONFIRM preferrred pharmacy with the patient. If Mail Order Rx - Pend for 90 day refill.       Last Seen: Last Seen Department: 5/4/2022  Last Seen by PCP: 5/4/2022    Last Written: Diazepam 50 with 0 6/23/2022   Metformin 180 with 0 6/23/2022     Next Appointment:   Future Appointments   Date Time Provider Barby Zepeda   8/19/2022  2:00 PM Jocelynn Mcbride MD [facility]   11/9/2022  4:00 PM Miguel Snow DO [facility] [place]       Future appointment scheduled      Requested Prescriptions     Pending Prescriptions Disp Refills    metFORMIN (GLUCOPHAGE) 1000 MG tablet 180 tablet 0    diazePAM (VALIUM) 5 MG tablet 50 tablet 0     Sig: TAKE 1 TABLET BY MOUTH TWICE A DAY (AS NEEDED, MUST LAST 30 DAYS)

## 2022-07-29 DIAGNOSIS — K85.00 IDIOPATHIC ACUTE PANCREATITIS WITHOUT INFECTION OR NECROSIS: Primary | ICD-10-CM

## 2022-07-30 ENCOUNTER — HOSPITAL ENCOUNTER (OUTPATIENT)
Age: 62
Discharge: HOME | End: 2022-07-30
Payer: COMMERCIAL

## 2022-07-30 DIAGNOSIS — K85.00 IDIOPATHIC ACUTE PANCREATITIS WITHOUT INFECTION OR NECROSIS: ICD-10-CM

## 2022-07-30 LAB
A/G RATIO: 1.5 (ref 1.1–2.2)
ALBUMIN SERPL-MCNC: 4.4 G/DL (ref 3.4–5)
ALP BLD-CCNC: 51 U/L (ref 40–129)
ALT SERPL-CCNC: 15 U/L (ref 10–40)
ANION GAP SERPL CALCULATED.3IONS-SCNC: 13 MMOL/L (ref 3–16)
AST SERPL-CCNC: 14 U/L (ref 15–37)
BASOPHILS ABSOLUTE: 0.1 K/UL (ref 0–0.2)
BASOPHILS RELATIVE PERCENT: 0.7 %
BILIRUB SERPL-MCNC: 0.4 MG/DL (ref 0–1)
BUN BLDV-MCNC: 22 MG/DL (ref 7–20)
C-REACTIVE PROTEIN: 5.1 MG/L (ref 0–5.1)
CALCIUM SERPL-MCNC: 10.2 MG/DL (ref 8.3–10.6)
CHLORIDE BLD-SCNC: 97 MMOL/L (ref 99–110)
CO2: 26 MMOL/L (ref 21–32)
CREAT SERPL-MCNC: 1.2 MG/DL (ref 0.8–1.3)
EOSINOPHILS ABSOLUTE: 0 K/UL (ref 0–0.6)
EOSINOPHILS RELATIVE PERCENT: 0.2 %
GFR AFRICAN AMERICAN: >60
GFR NON-AFRICAN AMERICAN: >60
GLUCOSE BLD-MCNC: 222 MG/DL (ref 70–99)
HCT VFR BLD CALC: 38.3 % (ref 40.5–52.5)
HEMOGLOBIN: 13.1 G/DL (ref 13.5–17.5)
LIPASE: 6 U/L (ref 13–60)
LYMPHOCYTES ABSOLUTE: 3 K/UL (ref 1–5.1)
LYMPHOCYTES RELATIVE PERCENT: 23.6 %
MCH RBC QN AUTO: 30.7 PG (ref 26–34)
MCHC RBC AUTO-ENTMCNC: 34.2 G/DL (ref 31–36)
MCV RBC AUTO: 89.8 FL (ref 80–100)
MONOCYTES ABSOLUTE: 0.9 K/UL (ref 0–1.3)
MONOCYTES RELATIVE PERCENT: 7.3 %
NEUTROPHILS ABSOLUTE: 8.8 K/UL (ref 1.7–7.7)
NEUTROPHILS RELATIVE PERCENT: 68.2 %
PDW BLD-RTO: 12.8 % (ref 12.4–15.4)
PLATELET # BLD: 198 K/UL (ref 135–450)
PMV BLD AUTO: 9.4 FL (ref 5–10.5)
POTASSIUM SERPL-SCNC: 3.9 MMOL/L (ref 3.5–5.1)
RBC # BLD: 4.27 M/UL (ref 4.2–5.9)
SODIUM BLD-SCNC: 136 MMOL/L (ref 136–145)
TOTAL PROTEIN: 7.4 G/DL (ref 6.4–8.2)
WBC # BLD: 12.9 K/UL (ref 4–11)

## 2022-07-30 PROCEDURE — 86140 C-REACTIVE PROTEIN: CPT

## 2022-07-30 PROCEDURE — 36415 COLL VENOUS BLD VENIPUNCTURE: CPT

## 2022-07-30 PROCEDURE — 80053 COMPREHEN METABOLIC PANEL: CPT

## 2022-07-30 PROCEDURE — 83690 ASSAY OF LIPASE: CPT

## 2022-07-30 PROCEDURE — 85025 COMPLETE CBC W/AUTO DIFF WBC: CPT

## 2022-08-01 DIAGNOSIS — R42 DIZZINESS: Primary | ICD-10-CM

## 2022-08-05 ENCOUNTER — HOSPITAL ENCOUNTER (OUTPATIENT)
Dept: NON INVASIVE DIAGNOSTICS | Age: 62
Discharge: HOME | End: 2022-08-05
Payer: COMMERCIAL

## 2022-08-05 DIAGNOSIS — R42 DIZZINESS: ICD-10-CM

## 2022-08-05 LAB
LV EF: 55 %
LVEF MODALITY: NORMAL

## 2022-08-05 PROCEDURE — 3430000000 HC RX DIAGNOSTIC RADIOPHARMACEUTICAL: Performed by: INTERNAL MEDICINE

## 2022-08-05 PROCEDURE — 93017 CV STRESS TEST TRACING ONLY: CPT

## 2022-08-05 PROCEDURE — A9502 TC99M TETROFOSMIN: HCPCS | Performed by: INTERNAL MEDICINE

## 2022-08-05 PROCEDURE — 6360000002 HC RX W HCPCS: Performed by: INTERNAL MEDICINE

## 2022-08-05 PROCEDURE — 78452 HT MUSCLE IMAGE SPECT MULT: CPT

## 2022-08-05 RX ADMIN — TETROFOSMIN 30 MILLICURIE: 1.38 INJECTION, POWDER, LYOPHILIZED, FOR SOLUTION INTRAVENOUS at 10:38

## 2022-08-05 RX ADMIN — TETROFOSMIN 10 MILLICURIE: 1.38 INJECTION, POWDER, LYOPHILIZED, FOR SOLUTION INTRAVENOUS at 09:02

## 2022-08-05 RX ADMIN — REGADENOSON 0.4 MG: 0.08 INJECTION, SOLUTION INTRAVENOUS at 10:38

## 2022-08-08 ENCOUNTER — TELEPHONE (OUTPATIENT)
Dept: CARDIOLOGY CLINIC | Age: 62
End: 2022-08-08

## 2022-08-08 DIAGNOSIS — I10 PRIMARY HYPERTENSION: Primary | ICD-10-CM

## 2022-08-08 NOTE — TELEPHONE ENCOUNTER
Pt sts his B/P is high. This AM was 221/160 with pulse 65. B/P has not dropped below 170 for awhile. Pt has recorded his B/P reading 3 times a day. If needed. Pt had stress test done on Friday. Results from [facility] was Please let patient know that his stress test is concerning for a severe blockage in one of his heart arteries. I recommend that follow-up be scheduled as soon as possible with Dr. Stephanie Baptiste to discuss whether or not he will need aheart catheterization. Thanks. Need to know where to add on. Please advise.

## 2022-08-08 NOTE — TELEPHONE ENCOUNTER
Called and spoke with patient. V/U    --order placed renal arterial duplex.  Provided patient with number for central scheduling    --patient already has appointment with P 8/19/2022, will keep appointment    --[facility] MED PENDING

## 2022-08-08 NOTE — TELEPHONE ENCOUNTER
The patient stress test is not a concern. False positive. He has similar findings in the past with angiograms showing no major CAD. Please get him scheduled for a renal arterial doppler. Also please start patient on hyzaar 50/12.5mg.     See us in office next couple weeks

## 2022-08-08 NOTE — TELEPHONE ENCOUNTER
Called and spoke with patient. Based off of blood pressure readings and results of stress test recommended patient urgently present to ED. Patient doesn't wish to present to ED, states he doesn't like [facility]. Nurse again instructed patient that he needs to present to ED for further evaluation and management of his blood pressure along with results of abnormal stress test. Patient asked for [facility] to call him directly and speak with him about this. He only wishes to talk to [facility].

## 2022-08-09 RX ORDER — LOSARTAN POTASSIUM AND HYDROCHLOROTHIAZIDE 12.5; 5 MG/1; MG/1
1 TABLET ORAL DAILY
Qty: 90 TABLET | Refills: 1 | Status: SHIPPED | OUTPATIENT
Start: 2022-08-09

## 2022-08-16 ENCOUNTER — HOSPITAL ENCOUNTER (OUTPATIENT)
Dept: VASCULAR LAB | Age: 62
Discharge: HOME | End: 2022-08-16
Payer: COMMERCIAL

## 2022-08-16 DIAGNOSIS — I10 PRIMARY HYPERTENSION: ICD-10-CM

## 2022-08-16 DIAGNOSIS — E11.9 TYPE 2 DIABETES MELLITUS WITHOUT COMPLICATION, WITH LONG-TERM CURRENT USE OF INSULIN (HCC): ICD-10-CM

## 2022-08-16 DIAGNOSIS — K21.9 GASTROESOPHAGEAL REFLUX DISEASE, UNSPECIFIED WHETHER ESOPHAGITIS PRESENT: ICD-10-CM

## 2022-08-16 DIAGNOSIS — Z79.4 TYPE 2 DIABETES MELLITUS WITHOUT COMPLICATION, WITH LONG-TERM CURRENT USE OF INSULIN (HCC): ICD-10-CM

## 2022-08-16 PROCEDURE — 93975 VASCULAR STUDY: CPT

## 2022-08-16 RX ORDER — ATORVASTATIN CALCIUM 40 MG/1
TABLET, FILM COATED ORAL
Qty: 90 TABLET | Refills: 0 | Status: SHIPPED | OUTPATIENT
Start: 2022-08-16

## 2022-08-16 RX ORDER — BLOOD SUGAR DIAGNOSTIC
1 STRIP MISCELLANEOUS DAILY
Qty: 100 EACH | Refills: 5 | Status: SHIPPED | OUTPATIENT
Start: 2022-08-16

## 2022-08-16 RX ORDER — PEN NEEDLE, DIABETIC 31 GX5/16"
NEEDLE, DISPOSABLE MISCELLANEOUS
Qty: 100 EACH | Refills: 5 | Status: SHIPPED | OUTPATIENT
Start: 2022-08-16

## 2022-08-16 RX ORDER — FAMOTIDINE 20 MG/1
20 TABLET, FILM COATED ORAL 2 TIMES DAILY
Qty: 180 TABLET | Refills: 0 | Status: SHIPPED | OUTPATIENT
Start: 2022-08-16

## 2022-08-16 RX ORDER — INSULIN GLARGINE 100 [IU]/ML
40 INJECTION, SOLUTION SUBCUTANEOUS 2 TIMES DAILY
Qty: 15 PEN | Refills: 5 | Status: SHIPPED | OUTPATIENT
Start: 2022-08-16 | End: 2022-08-31 | Stop reason: SDUPTHER

## 2022-08-16 RX ORDER — INSULIN ASPART 100 [IU]/ML
INJECTION, SOLUTION INTRAVENOUS; SUBCUTANEOUS
Qty: 30 ML | Refills: 5 | Status: SHIPPED | OUTPATIENT
Start: 2022-08-16 | End: 2022-08-31

## 2022-08-16 NOTE — TELEPHONE ENCOUNTER
Refill Request     CONFIRM preferrred pharmacy with the patient. If Mail Order Rx - Pend for 90 day refill. Last Seen: Last Seen Department: 2022  Last Seen by PCP: 2022    *patient has to change Pharmacy to mail order d/t insurance     Last Written: metformin 22 108 with 1  Pepcid 21 180 with 3  Lipitor 21 90 with 3  Test strips 21 300 with 3  Pen needles 20 100 with 3  Lantus 21 15 with 1  Novolog 21 30ml with 5  Next Appointment:   Future Appointments   Date Time Provider Barby Zepeda   2022  2:00 PM MD LAURA Mcdaniels   2022  4:00 PM DO LUIS M Jean Baptiste  [place] - [place]       Future appointment scheduled      Requested Prescriptions     Pending Prescriptions Disp Refills    metFORMIN (GLUCOPHAGE) 1000 MG tablet 180 tablet 1     Sig: TAKE 1 TABLET BY MOUTH TWICE A DAY    famotidine (PEPCID) 20 MG tablet 180 tablet 3     Sig: Take 1 tablet by mouth in the morning and 1 tablet before bedtime. atorvastatin (LIPITOR) 40 MG tablet 90 tablet 3     Sig: Take 1 tablet by mouth every day    insulin glargine (LANTUS SOLOSTAR) 100 UNIT/ML injection pen 15 pen 1     Sig: Inject 40 Units into the skin in the morning and 40 Units before bedtime. insulin aspart (NOVOLOG FLEXPEN) 100 UNIT/ML injection pen 30 mL 5     Sig: Inject up to 15 units subcutaneously twice a day per sliding scale    blood glucose test strips (ACCU-CHEK GUIDE) strip 100 each 2     Si each by In Vitro route daily As needed.     Insulin Pen Needle (B-D UF III MINI PEN NEEDLES) 31G X 5 MM MISC 100 each 3     Sig: Use up to 3 times a day

## 2022-08-17 ENCOUNTER — TELEPHONE (OUTPATIENT)
Dept: CARDIOLOGY CLINIC | Age: 62
End: 2022-08-17

## 2022-08-17 NOTE — TELEPHONE ENCOUNTER
----- Message from Jay Godinez MD sent at 8/17/2022  8:59 AM EDT -----  The test is normal. Please call the patient and inform them of the normal result. No evidence of renal artery stenosis.

## 2022-08-19 ENCOUNTER — OFFICE VISIT (OUTPATIENT)
Dept: CARDIOLOGY CLINIC | Age: 62
End: 2022-08-19
Payer: COMMERCIAL

## 2022-08-19 VITALS
BODY MASS INDEX: 28.99 KG/M2 | OXYGEN SATURATION: 98 % | SYSTOLIC BLOOD PRESSURE: 130 MMHG | WEIGHT: 214 LBS | HEIGHT: 72 IN | HEART RATE: 100 BPM | DIASTOLIC BLOOD PRESSURE: 72 MMHG

## 2022-08-19 DIAGNOSIS — R42 DIZZINESS: Primary | ICD-10-CM

## 2022-08-19 DIAGNOSIS — I25.10 CORONARY ARTERY DISEASE INVOLVING NATIVE CORONARY ARTERY OF NATIVE HEART WITHOUT ANGINA PECTORIS: ICD-10-CM

## 2022-08-19 DIAGNOSIS — E78.2 MIXED HYPERLIPIDEMIA: ICD-10-CM

## 2022-08-19 DIAGNOSIS — Z01.89 NEED FOR ASSESSMENT FOR SLEEP APNEA: ICD-10-CM

## 2022-08-19 DIAGNOSIS — R94.31 ABNORMAL EKG: ICD-10-CM

## 2022-08-19 DIAGNOSIS — I10 PRIMARY HYPERTENSION: ICD-10-CM

## 2022-08-19 PROCEDURE — 93000 ELECTROCARDIOGRAM COMPLETE: CPT | Performed by: INTERNAL MEDICINE

## 2022-08-19 PROCEDURE — 99214 OFFICE O/P EST MOD 30 MIN: CPT | Performed by: INTERNAL MEDICINE

## 2022-08-19 NOTE — PROGRESS NOTES
[address] [facility]   Cardiovascular Evaluation    PATIENT: Tammy Mark  DATE: 2022  MRN: [id number]  CSN: [id number]  : 1960    Primary Care Doctor: Bridger 173, DO    Reason for evaluation:   Follow-up, Chest Pain (Not all the time, some discomfort at times. ), Arm Pain (Left arm pain at times), Shortness of Breath (At times, gets winded quick), Fatigue (Tires easy), Edema (Finger on left hand), and Dizziness      Subjective:    History of present illness on initial date of evaluation:   Tammy Mark is a 59 y.o. patient who presents for follow up. He had a cardiac craterization 2015 which showed mild non-obstructive coronary artery disease. He had a normal stress test 2019. His carotid doppler study from 2020 was normal. He was admitted to the hospital 20 for left facial numbness. CT of head and CTA of head and neck were negative. MRI of the brain was unremarkable. He returned to the ED for evaluation 20 complaint of chest pain and shortness of breath. Chest xray showed no acute abnormality. CT chest showed no evidence for pulmonary emboli. Since last office visit he states he is having chest discomfort. Pain radiating to left arm new in nature. He also states he can feel heartbeat in his throat. He states he is having lightheaded episodes. Blood pressure noticed to be elevated. He states he does have fluctuations in BP as he was noted to be fairly normal readings he does not have the dizziness. He states he is fatigued.      Patient Active Problem List   Diagnosis    Chronic pain    Hypertension    Type 2 diabetes mellitus without complication (HCC)    Chronic back pain    Irritable bowel syndrome    Mixed hyperlipidemia    DJD (degenerative joint disease), lumbar    Actinic keratoses    Seborrheic keratosis    Hx of abdominal surgery    Umbilical hernia    Chest pain    Left arm pain    Lightheadedness    Sensory disturbance    Pain medication agreement tablet by mouth every day 90 tablet 0    insulin glargine (LANTUS SOLOSTAR) 100 UNIT/ML injection pen Inject 40 Units into the skin in the morning and 40 Units before bedtime. 15 pen 5    insulin aspart (NOVOLOG FLEXPEN) 100 UNIT/ML injection pen Inject up to 15 units subcutaneously twice a day per sliding scale 30 mL 5    blood glucose test strips (ACCU-CHEK GUIDE) strip 1 each by In Vitro route daily As needed. 100 each 5    Insulin Pen Needle (B-D UF III MINI PEN NEEDLES) 31G X 5 MM MISC Use up to 3 times a day 100 each 5    losartan-hydroCHLOROthiazide (HYZAAR) 50-12.5 MG per tablet Take 1 tablet by mouth in the morning. . 90 tablet 1    diazePAM (VALIUM) 5 MG tablet TAKE 1 TABLET BY MOUTH TWICE A DAY (AS NEEDED, MUST LAST 30 DAYS) 50 tablet 0    Probiotic Product (PROBIOTIC-10 ULTIMATE PO) Take by mouth      dicyclomine (BENTYL) 10 MG capsule       aspirin 81 MG chewable tablet Take 81 mg by mouth daily      Azelastine-Fluticasone 137-50 MCG/ACT SUSP SPRAY 1 SPRAY INTO EACH NOSTRIL EVERY DAY (Patient not taking: Reported on 8/19/2022) 1 each 2    montelukast (SINGULAIR) 10 MG tablet TAKE 1 TABLET BY MOUTH EVERY DAY 90 tablet 1    rifaximin (XIFAXAN) 550 MG tablet Take 550 mg by mouth 2 times daily (Patient not taking: Reported on 8/19/2022)       No current facility-administered medications on file prior to visit. Allergies:  Codeine, Dilaudid [hydromorphone hcl], Fentanyl, and Toradol [ketorolac tromethamine]     Review of Systems:   Review of Systems:   All 14 point review of symptoms completed. Pertinent positives identified in the HPI, all other review of symptoms negative as below.     Review of Systems - History obtained from the patient  General ROS: negative for - chills, fever or night sweats  Psychological ROS: negative for - disorientation or hallucinations  Ophthalmic ROS: negative for - dry eyes, eye pain or loss of vision  ENT ROS: negative for - nasal discharge or sore throat  Allergy and Immunology ROS: negative for - hives or itchy/watery eyes  Hematological and Lymphatic ROS: negative for - jaundice or night sweats  Endocrine ROS: negative for - mood swings or temperature intolerance  Breast ROS: deferred  Respiratory ROS: negative for - hemoptysis or stridor  Cardiovascular ROS: negative for - chest pain, dyspnea on exertion or palpitations  Gastrointestinal ROS: no abdominal pain, change in bowel habits, or black or bloody stools  Genito-Urinary ROS: no dysuria, trouble voiding, or hematuria  Musculoskeletal ROS: negative for - gait disturbance, joint pain or joint stiffness  Neurological ROS: negative for - seizures or speech problems  Dermatological ROS: negative for - rash or skin lesion changes      Physical Examination:    Vitals:    08/19/22 1315   BP: 130/72   Pulse: 100   SpO2: 98%      Weight: 214 lb (97.1 kg)     Wt Readings from Last 3 Encounters:   08/19/22 214 lb (97.1 kg)   05/04/22 211 lb (95.7 kg)   03/19/21 204 lb 6.4 oz (92.7 kg)     No intake or output data in the 24 hours ending 08/19/22 1457    General Appearance:  Alert, cooperative, no distress, appears stated age   Head:  Normocephalic, without obvious abnormality, atraumatic   Eyes:  PERRL, conjunctiva/corneas clear       Nose: Nares normal, no drainage or sinus tenderness   Throat: Lips, mucosa, and tongue normal   Neck: Supple, symmetrical, trachea midline, no adenopathy, thyroid: not enlarged, symmetric, no tenderness/mass/nodules, no carotid bruit or JVD       Lungs:   Clear to auscultation bilaterally, respirations unlabored   Chest Wall:  No tenderness or deformity   Heart:  Regular rhythm and normal rate; S1, S2 are normal; no murmur noted; no rub or gallop   Abdomen:   Soft, non-tender, bowel sounds active all four quadrants,  no masses, no organomegaly           Extremities: Extremities normal, atraumatic, no cyanosis or edema   Pulses: 2+ and symmetric   Skin: Skin color, texture, turgor normal, no rashes or lesions   Pysch: Normal mood and affect   Neurologic: Normal gross motor and sensory exam.         Labs  No results for input(s): WBC, HGB, HCT, MCV, PLT in the last 72 hours. No results for input(s): CREATININE, BUN, NA, K, CL, CO2 in the last 72 hours. No results for input(s): INR, PROTIME in the last 72 hours. No results for input(s): TROPONINI in the last 72 hours. Invalid input(s): PRO-BNP  No results for input(s): CHOL, LDL, HDL in the last 72 hours. Invalid input(s): TG        Assessment:  59 y.o. patient with:  1. Dizziness   ~on going  2. Mild non-obstructive coronary artery disease with preserved left ventricular function   ~Abnormal stress test with false positive inferior defects   ~Cath April 28, 2015  3. Hypertension   ~BP: (130)/(72)    5. Abnormal EKG with RBBB  6. Hyperlipidemia   ~on lipitor    Plan:  1. Continue taking losartan-hydrochlorothiazide (Hyzaar) 50-12.5 mg one tablet daily ~ blood pressure control  2. Recommend sleep apnea evaluation   3. Stress Test reviewed ~ abnormal results, however conclusive with prior testing and heart catheterization results. 4. Echocardiogram ~ evaluate strength of heart, structure, and valves. 5. Follow up in 6 months. Scribe's attestation: This note was scribed in the presence of Randi Watt by Nikolas Renee RN     I, Dr. Radha Whitaker, personally performed the services described in this documentation, as scribed by the above signed scribe in my presence. It is both accurate and complete to my knowledge. I agree with the details independently gathered by the clinical support staff, while the remaining scribed note accurately describes my personal service to the patient. Medical Decision Making:   The following items were considered in medical decision making:  Independent review of images  Review / order clinical lab tests  Review / order radiology tests  Decision to obtain old records  Review and summation of old records as accessed through [facility] (a summary of my findings in these old records)      Time Based Itemization  A total of 30 minutes was spent on today's patient encounter. If applicable, non-patient-facing activities:  (X)Preparing to see the patient and reviewing records  (X) Individual interpretation of results  ( ) Discussion or coordination of care with other health care professionals  () Ordering of unique tests, medications, or procedures  (X) Documentation within the EHR             All questions and concerns were addressed to the patient/family. Alternatives to my treatment were discussed. The note was completed using EMR. Every effort was made to ensure accuracy; however, inadvertent computerized transcription errors may be present.     Ottie Leyden, MD, [address], [facility]  [phone] [facility]  [phone] [facility]  8/19/2022  2:57 PM

## 2022-08-19 NOTE — PATIENT INSTRUCTIONS
Your provider has ordered testing for further evaluation. An order/prescription has been included in your paper work. To schedule outpatient testing, contact Central Scheduling by calling [address] [place] [address] ([phone]). Plan:  1. Continue taking losartan-hydrochlorothiazide (Hyzaar) 50-12.5 mg one tablet daily ~ blood pressure control  2. Recommend sleep apnea evaluation   3. Stress Test reviewed ~ abnormal results, however conclusive with prior testing and heart catheterization results. 4. Echocardiogram ~ evaluate strength of heart, structure, and valves. 5. Follow up in 6 months.

## 2022-08-30 ENCOUNTER — TELEPHONE (OUTPATIENT)
Dept: FAMILY MEDICINE CLINIC | Age: 62
End: 2022-08-30

## 2022-08-30 NOTE — TELEPHONE ENCOUNTER
Patient called, patient states Noy Head just informed him that the prescription for his Novolog must be changed to Humolog so insurance will cover medication. Also on the [facility] pharmacy states it is a 19 day supply instead of a 90 day supply. All medications must be a 90 day Supply. Please advise, patient states he will be out of medication soon if this isn't taking care of soon.  Please advise thank you so much

## 2022-08-31 DIAGNOSIS — E11.9 TYPE 2 DIABETES MELLITUS WITHOUT COMPLICATION, WITH LONG-TERM CURRENT USE OF INSULIN (HCC): ICD-10-CM

## 2022-08-31 DIAGNOSIS — Z79.4 TYPE 2 DIABETES MELLITUS WITHOUT COMPLICATION, WITH LONG-TERM CURRENT USE OF INSULIN (HCC): ICD-10-CM

## 2022-08-31 RX ORDER — INSULIN LISPRO 100 [IU]/ML
INJECTION, SOLUTION INTRAVENOUS; SUBCUTANEOUS
Qty: 90 ML | Refills: 3 | Status: SHIPPED | OUTPATIENT
Start: 2022-08-31 | End: 2022-09-08

## 2022-08-31 RX ORDER — INSULIN GLARGINE 100 [IU]/ML
40 INJECTION, SOLUTION SUBCUTANEOUS 2 TIMES DAILY
Qty: 45 PEN | Refills: 5 | Status: SHIPPED | OUTPATIENT
Start: 2022-08-31

## 2022-09-07 ENCOUNTER — TELEPHONE (OUTPATIENT)
Dept: CARDIOLOGY CLINIC | Age: 62
End: 2022-09-07

## 2022-09-07 NOTE — TELEPHONE ENCOUNTER
Pt stated that he is scheduled with [facility] medical imaging to get his echo done on 09/13/2022. Pt wanted to make vsp aware.

## 2022-09-08 RX ORDER — INSULIN LISPRO 100 [IU]/ML
INJECTION, SOLUTION INTRAVENOUS; SUBCUTANEOUS
Qty: 45 ADJUSTABLE DOSE PRE-FILLED PEN SYRINGE | Refills: 3 | Status: SHIPPED | OUTPATIENT
Start: 2022-09-08

## 2022-09-08 NOTE — TELEPHONE ENCOUNTER
Pt called in and stated we sent him the wrong humalog. He doesn't need the injection cartidges. He needs the injection pen.  And please send to [phone] in [facility]

## 2022-09-19 ENCOUNTER — TELEPHONE (OUTPATIENT)
Dept: CARDIOLOGY CLINIC | Age: 62
End: 2022-09-19

## 2022-10-10 DIAGNOSIS — F51.02 ADJUSTMENT INSOMNIA: ICD-10-CM

## 2022-10-11 RX ORDER — DIAZEPAM 5 MG/1
TABLET ORAL
Qty: 50 TABLET | Refills: 0 | Status: SHIPPED | OUTPATIENT
Start: 2022-10-11 | End: 2022-11-10

## 2022-10-11 NOTE — TELEPHONE ENCOUNTER
Refill Request - Controlled Substance    CONFIRM preferrred pharmacy with the patient. If Mail Order Rx - Pend for 90 day refill. Last Seen Department: 5/4/2022  Last Seen by PCP: 5/4/2022    Last Written: 07/26/2022 50 tablet 0 refills     Last UDS: 12/19/2017    Med Agreement Signed On: 01/04/2018    If no future appointment scheduled, route STAFF MESSAGE with patient name to the [facility] for scheduling. CONFIRM preferrred pharmacy with the patient. Next Appointment:   Future Appointments   Date Time Provider Barby Zeepda   11/9/2022  4:00 PM DO LUIS M Jean Baptiste [place] - [facility]       Message sent to [address] [place] [address] to schedule appt with patient?   NO      Requested Prescriptions     Pending Prescriptions Disp Refills    diazePAM (VALIUM) 5 MG tablet [Pharmacy Med Name: DIAZEPAM 5 MG TABLET] 50 tablet 0     Sig: TAKE 1 TABLET BY MOUTH TWICE A DAY AS NEEDED FOR ANXIETY *TO LAST FULL 30 DAYS

## 2022-11-06 DIAGNOSIS — F51.02 ADJUSTMENT INSOMNIA: ICD-10-CM

## 2022-11-06 NOTE — TELEPHONE ENCOUNTER
Refill Request - Controlled Substance    CONFIRM preferrred pharmacy with the patient. If Mail Order Rx - Pend for 90 day refill. Last Seen Department: 5/4/2022  Last Seen by PCP: 5/4/2022    Last Written: 10/11/22 50 with 0     Last UDS: no uds    Med Agreement Signed On: no med contract    If no future appointment scheduled, route STAFF MESSAGE with patient name to the [facility] for scheduling. CONFIRM preferrred pharmacy with the patient. Next Appointment:   Future Appointments   Date Time Provider Barby Zepeda   11/9/2022  4:00 PM DO LUIS M Jean Baptiste  [place] - [facility]       Message sent to [address] [place] [address] to schedule appt with patient?   N/A      Requested Prescriptions     Pending Prescriptions Disp Refills    diazePAM (VALIUM) 5 MG tablet [Pharmacy Med Name: DIAZEPAM 5 MG TABLET] 50 tablet 0     Sig: TAKE 1 TABLET BY MOUTH TWICE A DAY AS NEEDED FOR ANXIETY *TO LAST FULL 30 DAYS

## 2022-11-08 RX ORDER — DIAZEPAM 5 MG/1
TABLET ORAL
Qty: 50 TABLET | Refills: 0 | Status: SHIPPED | OUTPATIENT
Start: 2022-11-08 | End: 2022-12-06

## 2022-11-09 ENCOUNTER — OFFICE VISIT (OUTPATIENT)
Dept: FAMILY MEDICINE CLINIC | Age: 62
End: 2022-11-09
Payer: COMMERCIAL

## 2022-11-09 VITALS
OXYGEN SATURATION: 98 % | BODY MASS INDEX: 29.48 KG/M2 | HEART RATE: 83 BPM | WEIGHT: 217.4 LBS | RESPIRATION RATE: 16 BRPM | DIASTOLIC BLOOD PRESSURE: 62 MMHG | SYSTOLIC BLOOD PRESSURE: 130 MMHG

## 2022-11-09 DIAGNOSIS — L30.9 DERMATITIS: ICD-10-CM

## 2022-11-09 DIAGNOSIS — E78.2 MIXED HYPERLIPIDEMIA: ICD-10-CM

## 2022-11-09 DIAGNOSIS — K51.311 ULCERATIVE RECTOSIGMOIDITIS WITH RECTAL BLEEDING (HCC): ICD-10-CM

## 2022-11-09 DIAGNOSIS — M79.10 MYALGIA: Primary | ICD-10-CM

## 2022-11-09 DIAGNOSIS — R63.4 WEIGHT LOSS: ICD-10-CM

## 2022-11-09 DIAGNOSIS — Z79.4 TYPE 2 DIABETES MELLITUS WITHOUT COMPLICATION, WITH LONG-TERM CURRENT USE OF INSULIN (HCC): ICD-10-CM

## 2022-11-09 DIAGNOSIS — G89.4 CHRONIC PAIN SYNDROME: ICD-10-CM

## 2022-11-09 DIAGNOSIS — E11.9 TYPE 2 DIABETES MELLITUS WITHOUT COMPLICATION, WITH LONG-TERM CURRENT USE OF INSULIN (HCC): ICD-10-CM

## 2022-11-09 DIAGNOSIS — Z23 FLU VACCINE NEED: ICD-10-CM

## 2022-11-09 PROBLEM — R10.9 ABDOMINAL PAIN: Status: RESOLVED | Noted: 2021-02-12 | Resolved: 2022-11-09

## 2022-11-09 PROBLEM — R10.30 LOWER ABDOMINAL PAIN: Status: RESOLVED | Noted: 2017-01-06 | Resolved: 2022-11-09

## 2022-11-09 PROBLEM — M79.605 LEFT LEG PAIN: Status: RESOLVED | Noted: 2018-07-06 | Resolved: 2022-11-09

## 2022-11-09 PROCEDURE — 3078F DIAST BP <80 MM HG: CPT | Performed by: NURSE PRACTITIONER

## 2022-11-09 PROCEDURE — 3074F SYST BP LT 130 MM HG: CPT | Performed by: NURSE PRACTITIONER

## 2022-11-09 PROCEDURE — 3051F HG A1C>EQUAL 7.0%<8.0%: CPT | Performed by: NURSE PRACTITIONER

## 2022-11-09 PROCEDURE — 90471 IMMUNIZATION ADMIN: CPT | Performed by: NURSE PRACTITIONER

## 2022-11-09 PROCEDURE — 99214 OFFICE O/P EST MOD 30 MIN: CPT | Performed by: NURSE PRACTITIONER

## 2022-11-09 PROCEDURE — 90674 CCIIV4 VAC NO PRSV 0.5 ML IM: CPT | Performed by: NURSE PRACTITIONER

## 2022-11-09 PROCEDURE — 36415 COLL VENOUS BLD VENIPUNCTURE: CPT | Performed by: NURSE PRACTITIONER

## 2022-11-09 RX ORDER — TRIAMCINOLONE ACETONIDE 0.25 MG/G
CREAM TOPICAL
Qty: 30 G | Refills: 0 | Status: SHIPPED | OUTPATIENT
Start: 2022-11-09

## 2022-11-09 RX ORDER — GABAPENTIN 300 MG/1
300 CAPSULE ORAL 3 TIMES DAILY
Qty: 90 CAPSULE | Refills: 0 | Status: SHIPPED | OUTPATIENT
Start: 2022-11-09 | End: 2022-12-09

## 2022-11-09 ASSESSMENT — PATIENT HEALTH QUESTIONNAIRE - PHQ9
SUM OF ALL RESPONSES TO PHQ QUESTIONS 1-9: 0
SUM OF ALL RESPONSES TO PHQ9 QUESTIONS 1 & 2: 0
1. LITTLE INTEREST OR PLEASURE IN DOING THINGS: 0
2. FEELING DOWN, DEPRESSED OR HOPELESS: 0

## 2022-11-09 NOTE — PROGRESS NOTES
Lyna Kayser (:  1960) is a 57 y.o. male,Established patient, here for evaluation of the following chief complaint(s): Other (Leg Blemishes, Red on the R shine and bones in bottom bothering him)         ASSESSMENT/PLAN:  1. Myalgia  -     CK  2. Type 2 diabetes mellitus without complication, with long-term current use of insulin (HCC)  -     Hemoglobin A1C  3. Chronic pain syndrome  -     gabapentin (NEURONTIN) 300 MG capsule; Take 1 capsule by mouth 3 times daily for 30 days. , Disp-90 capsule, R-0Normal  4. Weight loss  -     TSH  5. Mixed hyperlipidemia  -     Lipid Panel  -     Comprehensive Metabolic Panel w/ Reflex to MG  6. Dermatitis  -     triamcinolone (KENALOG) 0.025 % cream; Apply topically 2 times daily. , Disp-30 g, R-0, Normal  7. Flu vaccine need  -     Influenza, FLUCELVAX, (age 10 mo+), IM, Preservative Free, 0.5 mL  8. Ulcerative rectosigmoiditis with rectal bleeding (HCC)      Will trial neurontin for night discomfort. States has taken in the past but stopped. Has spine specialist he is working with, not sure of name. Considering disability given his level of discomfort. Feel the concern in the buttock area is diminished tissue related to previous weight loss and a job that required him to sit most of the time. Using cushion for comfort. Continue to follow up with back specialist.       Return in about 3 months (around 2023) for diabetes---Dr. Snow. Subjective   SUBJECTIVE/OBJECTIVE:  HPI    4 years ago was very ill and lost 100 pounds. Went to [facility] and was dx with pancreatic insufficiency. Tx with creon. Lost muscle mass. Work sitting operating equipment. Feels there is pressure in the buttock area. Worried about skin on buttocks being wrinkled, sagging down. Sagging skin in arms. Following with provider for collapsed vertebrae and was advised to have a cage placed. Researched the procedure and is afraid to have done.      Right lower leg anterior with scattered red areas that are not present in the morning but more noted as the day progresses. Denies itching. Review of Systems   All other systems reviewed and are negative. Objective   Physical Exam  Constitutional:       Appearance: Normal appearance. Cardiovascular:      Rate and Rhythm: Normal rate. Pulses: Normal pulses. Pulmonary:      Effort: Pulmonary effort is normal.   Musculoskeletal:      Comments: Buttocks bilateral with scant tissue, neg for ulcerations. Guarded ROM generalized. Skin:     Comments: Right anterior lower leg with scattered flat red area, some with appearance of bruise, other with dry skin around edges. Skin intact. Neurological:      Mental Status: He is alert and oriented to person, place, and time.    Psychiatric:         Behavior: Behavior normal.                An electronic signature was used to authenticate this note.    --AMADA PATEL - CNP

## 2022-11-09 NOTE — LETTER
CONTROLLED SUBSTANCE MEDICATION AGREEMENT     Patient Name: Ariella Barnard  Patient YOB: 1960   I understand, that controlled substance medications may be used to help better manage my symptoms and to improve my ability to function at home, work and in social settings. However, I also understand that these medications do have risks, which have been discussed with me, including possible development of physical or psychological dependence. I understand that successful treatment requires mutual trust and honesty between me and my provider. I understand and agree that following this Medication Agreement is necessary in continuing my provider-patient relationship and the success of my treatment plan. Explanation from my Provider: Benefits and Goals of Controlled Substance Medications: There are two potential goals for your treatment: (1) decreased pain and suffering (2) improved daily life functions. There are many possible treatments for your chronic condition(s). Alternatives such as physical therapy, yoga, massage, home daily exercise, meditation, relaxation techniques, injections, chiropractic manipulations, surgery, cognitive therapy, hypnosis and many medications that are not habit-forming may be used. Use of controlled substance medications may be helpful, but they are unlikely to resolve all symptoms or restore all function. Explanation from my Provider: Risks of Controlled Substance Medications:  Opioid pain medications: These medications can lead to problems such as addiction/dependence, sedation, lightheadedness/dizziness, memory issues, falls, constipation, nausea, or vomiting. They may also impair the ability to drive or operate machinery. Additionally, these medications may lower testosterone levels, leading to loss of bone strength, stamina and sex drive.   They may cause problems with breathing, sleep apnea and reduced coughing, which is especially dangerous for patients with lung disease. Overdose or dangerous interactions with alcohol and other medications may occur, leading to death. Hyperalgesia may develop, which means patients receiving opioids for the treatment of pain may become more sensitive to certain painful stimuli, and in some cases, experience pain from ordinarily non-painful stimuli. Women between the ages of 14-53 who could become pregnant should carefully weigh the risks and benefits of opioids with their physicians, as these medications increase the risk of pregnancy complications, including miscarriage,  delivery and stillbirth. It is also possible for babies to be born addicted to opioids. Opioid dependence withdrawal symptoms may include; feelings of uneasiness, increased pain, irritability, belly pain, diarrhea, sweats and goose-flesh. Benzodiazepines and non-benzodiazepine sleep medications: These medications can lead to problems such as addiction/dependence, sedation, fatigue, lightheadedness, dizziness, incoordination, falls, depression, hallucinations, and impaired judgment, memory and concentration. The ability to drive and operate machinery may also be affected. Abnormal sleep-related behaviors have been reported, including sleepwalking, driving, making telephone calls, eating, or having sex while not fully awake. These medications can suppress breathing and worsen sleep apnea, particularly when combined with alcohol or other sedating medications, potentially leading to death. Dependence withdrawal symptoms may include tremors, anxiety, hallucinations and seizures. Stimulants:  Common adverse effects include addiction/dependence, increased blood  pressure and heart rate, decreased appetite, nausea, involuntary weight loss, insomnia,                                                                                                                     Initials:_______   irritability, and headaches.   These risks may increase when these medications are combined with other stimulants, such as caffeine pills or energy drinks, certain weight loss supplements and oral decongestants. Dependence withdrawal symptoms may include depressed mood, loss of interest, suicidal thoughts, anxiety, fatigue, appetite changes and agitation. Testosterone replacement therapy:  Potential side effects include increased risk of stroke and heart attack, blood clots, increased blood pressure, increased cholesterol, enlarged prostate, sleep apnea, irritability/aggression and other mood disorders, and decreased fertility. I agree and understand that I and my prescriber have the following rights and responsibilities regarding my treatment plan:     1. MY RIGHTS:  To be informed of my treatment and medication plan. To be an active participant in my health and wellbeing. 2. MY RESPONSIBILITY AND UNDERSTANDING FOR USE OF MEDICATIONS   I will take medications at the dose and frequency as directed. For my safety, I will not increase or change how I take my medications without the recommendation of my healthcare provider.  I will actively participate in any program recommended by my provider which may improve function, including social, physical, psychological programs.  I will not take my medications with alcohol or other drugs not prescribed to me. I understand that drinking alcohol with my medications increases the chances of side effects, including reduced breathing rate and could lead to personal injury when operating machinery.  I understand that if I have a history of substance use disorders, including alcohol or other illicit drugs, that I may be at increased risk of addiction to my medications.  I agree to notify my provider immediately if I should become pregnant so that my treatment plan can be adjusted.    I agree and understand that I shall only receive controlled substance medications from the prescriber that signed this agreement unless there is written agreement among other prescribers of controlled substances outlining the responsibility of the medications being prescribed.  I understand that the if the controlled medication is not helping to achieve goals, the dosage may be tapered and no longer prescribed. 3. MY RESPONSIBILITY FOR COMMUNICATION / PRESCRIPTION RENEWALS   I agree that all controlled substance medications that I take will be prescribed only by my provider. If another healthcare provider prescribes me medication in an emergency, I will notify my provider within seventy-two (72) hours.  I will arrange for refills at the prescribed interval ONLY during regular office hours. I will not ask for refills earlier than agreed, after-hours, on holidays or weekends. Refills may take up to 72 hours for processing and prescriptions to reach the pharmacy.  I will inform my other health care providers that I am taking these medications and of the existence of this [id number]. In the event of an emergency, I will provide the same information to the emergency department prescribers.  I will keep my provider updated on the pharmacy I am using for controlled medication prescription filling. Initials:_______  4. MY RESPONSIBILITY FOR PROTECTING MEDICATIONS   I will protect my prescriptions and medications. I understand that lost or misplaced prescriptions will not be replaced.  I will keep medications only for my own use and will not share them with others. I will keep all medications away from children.  I agree that if my medications are adjusted or discontinued, I will properly dispose of any remaining medications. I understand that I will be required to dispose of any remaining controlled medications as, directed by my prescriber, prior to being provided with any prescriptions for other controlled medications.   Medication drop box locations can be found at: [employer]    5. MY RESPONSIBILITY WITH ILLEGAL DRUGS    I will not use illegal or street drugs or another person's prescription medications not prescribed to me.  If there are identified addiction type symptoms, then referral to a program may be provided by my provider and I agree to follow through with this recommendation. 6. MY RESPONSIBILITY FOR COOPERATION WITH INVESTIGATIONS   I understand that my provider will comply with any applicable law and may discuss my use and/or possible misuse/abuse of controlled substances and alcohol, as appropriate, with any health care provider involved in my care, pharmacist, or legal authority.  I authorize my provider and pharmacy to cooperate fully with law enforcement agencies (as permitted by law) in the investigation of any possible misuse, sale, or other diversion of my controlled substances.  I agree to waive any applicable privilege or right of privacy or confidentiality with respect to these authorizations. 7. PROVIDERS RIGHT TO MONITOR FOR SAFETY: PRESCRIPTION MONITORING / DRUG TESTING   I consent to drug/toxicology screening and will submit to a drug screen upon my providers request to assure I am only taking the prescribed drugs for my safety monitoring. I understand that a drug screen is a laboratory test in which a sample of my urine, blood or saliva is checked to see what drugs I have been taking. This may entail an observed urine specimen, which means that a nurse or other health care provider may watch me provide urine, and I will cooperate if I am asked to provide an observed specimen.  I understand that my provider will check a copy of my State Prescription Monitoring Program () Report in order to safely prescribe medications.  Pill Counts: I consent to pill counts when requested.   I may be asked to bring all my prescribed contact my HIPAA contact if there are concerns about my safety and use of the controlled medications. I have agreed to use the prescribed controlled substance medications to me as instructed by my provider and as stated in this Medication Agreement. My initial on each page and my signature below shows that I have read each page and I have had the opportunity to ask questions with answers provided by my provider.     Patient Name (Printed): _____________________________________  Patient Signature:  ______________________   Date: _____________    Prescriber Name (Printed): __Judy Ramirez________________  Prescriber Signature: _____________________  Date: __11/9/2022___________

## 2022-11-09 NOTE — PROGRESS NOTES
2022 - 2023 Flu Vaccine Questionnaire    VIS given -  Yes    Have you received any other vaccine within the last 14 days? No  2. Do you currently have an active infectious or acute respiratory illness or fever? No  3. Are you taking steroids or immune suppressive drugs? No  4. Have you ever had a reaction to a flu vaccine? No  5. Are you allergic to eggs, egg products, chicken, Thimerosal (preservative) Gentamycin, polymixin, neomycin or Latex? No  6. Have you ever had [place] Syndrome?   No

## 2022-11-10 LAB
A/G RATIO: 1.8 (ref 1.1–2.2)
ALBUMIN SERPL-MCNC: 4.6 G/DL (ref 3.4–5)
ALP BLD-CCNC: 49 U/L (ref 40–129)
ALT SERPL-CCNC: 18 U/L (ref 10–40)
ANION GAP SERPL CALCULATED.3IONS-SCNC: 14 MMOL/L (ref 3–16)
AST SERPL-CCNC: 20 U/L (ref 15–37)
BILIRUB SERPL-MCNC: 0.4 MG/DL (ref 0–1)
BUN BLDV-MCNC: 18 MG/DL (ref 7–20)
CALCIUM SERPL-MCNC: 9.4 MG/DL (ref 8.3–10.6)
CHLORIDE BLD-SCNC: 101 MMOL/L (ref 99–110)
CHOLESTEROL, TOTAL: 127 MG/DL (ref 0–199)
CO2: 24 MMOL/L (ref 21–32)
CREAT SERPL-MCNC: 1 MG/DL (ref 0.8–1.3)
ESTIMATED AVERAGE GLUCOSE: 165.7 MG/DL
GFR SERPL CREATININE-BSD FRML MDRD: >60 ML/MIN/{1.73_M2}
GLUCOSE BLD-MCNC: 237 MG/DL (ref 70–99)
HBA1C MFR BLD: 7.4 %
HDLC SERPL-MCNC: 53 MG/DL (ref 40–60)
LDL CHOLESTEROL CALCULATED: 57 MG/DL
POTASSIUM REFLEX MAGNESIUM: 4.5 MMOL/L (ref 3.5–5.1)
SODIUM BLD-SCNC: 139 MMOL/L (ref 136–145)
TOTAL CK: 118 U/L (ref 39–308)
TOTAL PROTEIN: 7.2 G/DL (ref 6.4–8.2)
TRIGL SERPL-MCNC: 86 MG/DL (ref 0–150)
TSH SERPL DL<=0.05 MIU/L-ACNC: 1.72 UIU/ML (ref 0.27–4.2)
VLDLC SERPL CALC-MCNC: 17 MG/DL

## 2022-11-11 DIAGNOSIS — E11.9 TYPE 2 DIABETES MELLITUS WITHOUT COMPLICATION, WITH LONG-TERM CURRENT USE OF INSULIN (HCC): ICD-10-CM

## 2022-11-11 DIAGNOSIS — Z79.4 TYPE 2 DIABETES MELLITUS WITHOUT COMPLICATION, WITH LONG-TERM CURRENT USE OF INSULIN (HCC): ICD-10-CM

## 2022-11-11 RX ORDER — INSULIN GLARGINE 100 [IU]/ML
30 INJECTION, SOLUTION SUBCUTANEOUS 2 TIMES DAILY
Qty: 5 ADJUSTABLE DOSE PRE-FILLED PEN SYRINGE | Refills: 5
Start: 2022-11-11

## 2022-12-07 DIAGNOSIS — F51.02 ADJUSTMENT INSOMNIA: ICD-10-CM

## 2022-12-07 RX ORDER — DIAZEPAM 5 MG/1
TABLET ORAL
Qty: 50 TABLET | Refills: 0 | Status: SHIPPED | OUTPATIENT
Start: 2022-12-07 | End: 2023-01-07

## 2022-12-07 NOTE — TELEPHONE ENCOUNTER
Refill Request - Controlled Substance    CONFIRM preferrred pharmacy with the patient. If Mail Order Rx - Pend for 90 day refill. Last Seen Department: 11/9/2022    Last Seen by PCP: 5/4/2022    Last Written: 11/8/22 50 tablet 0 refills    Last UDS: 12/19/17     Med Agreement Signed On: 11/15/22    If no future appointment scheduled, route STAFF MESSAGE with patient name to the [facility] for scheduling. CONFIRM preferrred pharmacy with the patient. Next Appointment: n/a  No future appointments. Message sent to [address] [place] [address] to schedule appt with patient?   NO      Requested Prescriptions     Pending Prescriptions Disp Refills    diazePAM (VALIUM) 5 MG tablet [Pharmacy Med Name: DIAZEPAM 5 MG TABLET] 50 tablet 0     Sig: TAKE 1 TABLET BY MOUTH TWICE A DAY AS NEEDED FOR ANXIETY *TO LAST FULL 30 DAYS

## 2022-12-21 DIAGNOSIS — E11.9 TYPE 2 DIABETES MELLITUS WITHOUT COMPLICATION, WITH LONG-TERM CURRENT USE OF INSULIN (HCC): ICD-10-CM

## 2022-12-21 DIAGNOSIS — Z79.4 TYPE 2 DIABETES MELLITUS WITHOUT COMPLICATION, WITH LONG-TERM CURRENT USE OF INSULIN (HCC): ICD-10-CM

## 2022-12-21 RX ORDER — INSULIN GLARGINE 100 [IU]/ML
30 INJECTION, SOLUTION SUBCUTANEOUS 2 TIMES DAILY
Qty: 6 ADJUSTABLE DOSE PRE-FILLED PEN SYRINGE | Refills: 5 | Status: SHIPPED | OUTPATIENT
Start: 2022-12-21

## 2022-12-21 NOTE — TELEPHONE ENCOUNTER
Rosalind Simons called into the office today stating that he has changed his insurance and they are requiring that a new prescription be sent in for his Lantus. They will only cover a 1 month supply at a time. Order pended.

## 2023-01-03 NOTE — PROGRESS NOTES
history that includes Colon surgery; Abdominal adhesion surgery; Tonsillectomy; Colonoscopy; other surgical history (9-26-12); Throat surgery (Bilateral, october 2013); Cataract removal with implant (Left, 11/13/15); hernia repair; Upper gastrointestinal endoscopy (02/10/2017); and Abdomen surgery. Social History:   reports that he quit smoking about 38 years ago. He has never used smokeless tobacco. He reports that he does not drink alcohol or use drugs. Family History:  No evidence for sudden cardiac death or premature CAD    Medications:  Reviewed and are listed in nursing record. and/or listed below  Current Outpatient Prescriptions on File Prior to Visit   Medication Sig Dispense Refill    fluticasone (FLONASE) 50 MCG/ACT nasal spray 2 sprays by Nasal route daily 1 Bottle 1    diazepam (VALIUM) 10 MG tablet TAKE 1 AND 1/2 TABLETS BY MOUTH DAILY, TO LAST 30 DAYS.  40 tablet 0    Probiotic Product (PROBIOTIC-10 ULTIMATE PO) Take by mouth      metFORMIN (GLUCOPHAGE) 1000 MG tablet TAKE 1 TABLET BY MOUTH TWICE A  tablet 1    glipiZIDE (GLUCOTROL) 5 MG tablet TAKE 1 AND 1/2 TABLETS BY MOUTH TWICE A  tablet 5    lisinopril (PRINIVIL;ZESTRIL) 20 MG tablet TAKE 1 TABLET BY MOUTH EVERY DAY 90 tablet 1    insulin glargine (BASAGLAR KWIKPEN) 100 UNIT/ML injection pen 70 units hs 5 pen 5    insulin aspart (NOVOLOG FLEXPEN) 100 UNIT/ML injection pen INJECT UP TO 15 UNITS TWICE DAILY PER SLIDING SCALE 45 pen 2    atorvastatin (LIPITOR) 40 MG tablet TAKE 1 TABLET BY MOUTH EVERY DAY 90 tablet 1    pantoprazole (PROTONIX) 40 MG tablet TAKE 1 TABLET BY MOUTH DAILY 90 tablet 2    dicyclomine (BENTYL) 10 MG capsule       aspirin 81 MG chewable tablet Take 81 mg by mouth daily      glucose blood VI test strips (ACCU-CHEK AMA PLUS) strip USE AS DIRECTED UP TO 3 TIMES A DAY Dispense One Touch 300 strip 2    ACCU-CHEK FASTCLIX LANCETS MISC 6 Sticks by Does not apply route 5 times daily DX: E11.9 altered mental status ending 07/30/18 1310    General Appearance:  Alert, cooperative, no distress, appears stated age   Head:  Normocephalic, without obvious abnormality, atraumatic   Eyes:  PERRL, conjunctiva/corneas clear       Nose: Nares normal, no drainage or sinus tenderness   Throat: Lips, mucosa, and tongue normal   Neck: Supple, symmetrical, trachea midline, no adenopathy, thyroid: not enlarged, symmetric, no tenderness/mass/nodules, no carotid bruit or JVD       Lungs:   Clear to auscultation bilaterally, respirations unlabored   Chest Wall:  No tenderness or deformity   Heart:  Regular rhythm and normal rate; S1, S2 are normal; no murmur noted; no rub or gallop   Abdomen:   Soft, non-tender, bowel sounds active all four quadrants,  no masses, no organomegaly           Extremities: Extremities normal, atraumatic, no cyanosis or edema   Pulses: 2+ and symmetric   Skin: Skin color, texture, turgor normal, no rashes or lesions   Pysch: Normal mood and affect   Neurologic: Normal gross motor and sensory exam.         Labs  No results for input(s): WBC, HGB, HCT, MCV, PLT in the last 72 hours. No results for input(s): CREATININE, BUN, NA, K, CL, CO2 in the last 72 hours. No results for input(s): INR, PROTIME in the last 72 hours. No results for input(s): TROPONINI in the last 72 hours. Invalid input(s): PRO-BNP  No results for input(s): CHOL, HDL in the last 72 hours. Invalid input(s): LDL, TG        Assessment:  59 y.o. patient with:  1. Mild nonobstructive coronary artery disease with preserved left ventricular function   ~Abnormal stress test   ~Cath April 28, 2015  2. Hypertension   ~BP: (116)/(64)    3. Obesity   ~Body mass index is 33.23 kg/m². 4. Abnormal EKG with RBBB  5. Hyperlipidemia   ~on lipitor    Plan:  1. The patient was seen for >25 minutes.  >50% of the time was devoted to giving the patient detailed instructions instructions on addressing diet, regular exercise, weight control, smoking abstention, medication compliance, and stress minimization. The patient was provided written and verbal instructions regarding risk factor modification. 2. I recommend that the patient continue their currently prescribed medications. Their drug modifiable risk factors appear to be well controlled. I will continue to address the need/dosing of medications in future visits. 3. Follow up with me in 1 year. All questions and concerns were addressed to the patient/family. Alternatives to my treatment were discussed. The note was completed using EMR. Every effort was made to ensure accuracy; however, inadvertent computerized transcription errors may be present.     Jeremy Kong MD, [address], [place] [phone] [facility] office  [phone] Main central  7/30/2018  1:10 PM

## 2023-01-07 DIAGNOSIS — F51.02 ADJUSTMENT INSOMNIA: ICD-10-CM

## 2023-01-07 DIAGNOSIS — I10 PRIMARY HYPERTENSION: ICD-10-CM

## 2023-01-07 NOTE — TELEPHONE ENCOUNTER
Refill Request - Controlled Substance    CONFIRM preferrred pharmacy with the patient.    If Mail Order Rx - Pend for 90 day refill.        Last Seen Department: 11/9/2022  Last Seen by PCP: 5/4/2022    Last Written: 12/7/2022    Last UDS: 12/19/2017    Med Agreement Signed On: 11/9/2022    If no future appointment scheduled, route STAFF MESSAGE with patient name to the  Pool for scheduling.CONFIRM preferrred pharmacy with the patient.     Next Appointment:   No future appointments.    Message sent to  to schedule appt with patient?  NO      Requested Prescriptions     Pending Prescriptions Disp Refills    diazePAM (VALIUM) 5 MG tablet [Pharmacy Med Name: DIAZEPAM 5 MG TABLET] 50 tablet 0     Sig: TAKE 1 TABLET BY MOUTH TWICE A DAY AS NEEDED FOR ANXIETY *TO LAST FULL 30 DAYS

## 2023-01-08 RX ORDER — DIAZEPAM 5 MG/1
TABLET ORAL
Qty: 50 TABLET | Refills: 0 | Status: SHIPPED | OUTPATIENT
Start: 2023-01-08 | End: 2023-02-06

## 2023-01-09 ENCOUNTER — TELEPHONE (OUTPATIENT)
Dept: FAMILY MEDICINE CLINIC | Age: 63
End: 2023-01-09

## 2023-01-09 RX ORDER — LOSARTAN POTASSIUM AND HYDROCHLOROTHIAZIDE 12.5; 5 MG/1; MG/1
TABLET ORAL
Qty: 90 TABLET | Refills: 2 | Status: SHIPPED | OUTPATIENT
Start: 2023-01-09

## 2023-01-09 RX ORDER — INSULIN GLARGINE 100 [IU]/ML
INJECTION, SOLUTION SUBCUTANEOUS
Qty: 15 ADJUSTABLE DOSE PRE-FILLED PEN SYRINGE | Refills: 3 | Status: SHIPPED | OUTPATIENT
Start: 2023-01-09 | End: 2023-01-19

## 2023-01-09 NOTE — TELEPHONE ENCOUNTER
Incoming fax from pharmacy attached. Lantus Solostar 100 unit is not covered by current insurance plan.     Alternatives requested:  Brianda Ennis

## 2023-01-10 ENCOUNTER — HOSPITAL ENCOUNTER (OUTPATIENT)
Age: 63
Discharge: HOME | End: 2023-01-10
Payer: COMMERCIAL

## 2023-01-10 PROCEDURE — 80053 COMPREHEN METABOLIC PANEL: CPT

## 2023-01-10 PROCEDURE — 85025 COMPLETE CBC W/AUTO DIFF WBC: CPT

## 2023-01-10 PROCEDURE — 86141 C-REACTIVE PROTEIN HS: CPT

## 2023-01-10 PROCEDURE — 36415 COLL VENOUS BLD VENIPUNCTURE: CPT

## 2023-01-11 ENCOUNTER — ANESTHESIA EVENT (OUTPATIENT)
Dept: ENDOSCOPY | Age: 63
End: 2023-01-11
Payer: COMMERCIAL

## 2023-01-11 LAB
A/G RATIO: 1.4 (ref 1.1–2.2)
ALBUMIN SERPL-MCNC: 4.3 G/DL (ref 3.4–5)
ALP BLD-CCNC: 50 U/L (ref 40–129)
ALT SERPL-CCNC: 26 U/L (ref 10–40)
ANION GAP SERPL CALCULATED.3IONS-SCNC: 14 MMOL/L (ref 3–16)
AST SERPL-CCNC: 29 U/L (ref 15–37)
BASOPHILS ABSOLUTE: 0 K/UL (ref 0–0.2)
BASOPHILS RELATIVE PERCENT: 0.3 %
BILIRUB SERPL-MCNC: 0.3 MG/DL (ref 0–1)
BUN BLDV-MCNC: 15 MG/DL (ref 7–20)
C-REACTIVE PROTEIN, HIGH SENSITIVITY: 3.19 MG/L (ref 0.16–3)
CALCIUM SERPL-MCNC: 9.5 MG/DL (ref 8.3–10.6)
CHLORIDE BLD-SCNC: 100 MMOL/L (ref 99–110)
CO2: 23 MMOL/L (ref 21–32)
CREAT SERPL-MCNC: 1.1 MG/DL (ref 0.8–1.3)
EOSINOPHILS ABSOLUTE: 0.1 K/UL (ref 0–0.6)
EOSINOPHILS RELATIVE PERCENT: 0.9 %
GFR SERPL CREATININE-BSD FRML MDRD: >60 ML/MIN/{1.73_M2}
GLUCOSE BLD-MCNC: 72 MG/DL (ref 70–99)
HCT VFR BLD CALC: 45.4 % (ref 40.5–52.5)
HEMOGLOBIN: 14.6 G/DL (ref 13.5–17.5)
LYMPHOCYTES ABSOLUTE: 3 K/UL (ref 1–5.1)
LYMPHOCYTES RELATIVE PERCENT: 42.8 %
MCH RBC QN AUTO: 30.3 PG (ref 26–34)
MCHC RBC AUTO-ENTMCNC: 32.2 G/DL (ref 31–36)
MCV RBC AUTO: 94 FL (ref 80–100)
MONOCYTES ABSOLUTE: 0.6 K/UL (ref 0–1.3)
MONOCYTES RELATIVE PERCENT: 8.9 %
NEUTROPHILS ABSOLUTE: 3.3 K/UL (ref 1.7–7.7)
NEUTROPHILS RELATIVE PERCENT: 47.1 %
PDW BLD-RTO: 13.6 % (ref 12.4–15.4)
PLATELET # BLD: 195 K/UL (ref 135–450)
PMV BLD AUTO: 9.5 FL (ref 5–10.5)
POTASSIUM SERPL-SCNC: 4.5 MMOL/L (ref 3.5–5.1)
RBC # BLD: 4.83 M/UL (ref 4.2–5.9)
SODIUM BLD-SCNC: 137 MMOL/L (ref 136–145)
TOTAL PROTEIN: 7.3 G/DL (ref 6.4–8.2)
WBC # BLD: 7 K/UL (ref 4–11)

## 2023-01-12 ENCOUNTER — HOSPITAL ENCOUNTER (OUTPATIENT)
Age: 63
Setting detail: OUTPATIENT SURGERY
Discharge: HOME | End: 2023-01-12
Attending: INTERNAL MEDICINE | Admitting: INTERNAL MEDICINE
Payer: COMMERCIAL

## 2023-01-12 ENCOUNTER — ANESTHESIA (OUTPATIENT)
Dept: ENDOSCOPY | Age: 63
End: 2023-01-12
Payer: COMMERCIAL

## 2023-01-12 VITALS
OXYGEN SATURATION: 96 % | WEIGHT: 205 LBS | HEIGHT: 72 IN | RESPIRATION RATE: 16 BRPM | SYSTOLIC BLOOD PRESSURE: 130 MMHG | HEART RATE: 84 BPM | BODY MASS INDEX: 27.77 KG/M2 | DIASTOLIC BLOOD PRESSURE: 81 MMHG | TEMPERATURE: 96.9 F

## 2023-01-12 DIAGNOSIS — K51.90 ULCERATIVE COLITIS WITHOUT COMPLICATIONS, UNSPECIFIED LOCATION (HCC): ICD-10-CM

## 2023-01-12 LAB
GLUCOSE BLD-MCNC: 79 MG/DL (ref 70–99)
PERFORMED ON: NORMAL

## 2023-01-12 PROCEDURE — 3700000000 HC ANESTHESIA ATTENDED CARE: Performed by: INTERNAL MEDICINE

## 2023-01-12 PROCEDURE — 7100000011 HC PHASE II RECOVERY - ADDTL 15 MIN: Performed by: INTERNAL MEDICINE

## 2023-01-12 PROCEDURE — 3700000001 HC ADD 15 MINUTES (ANESTHESIA): Performed by: INTERNAL MEDICINE

## 2023-01-12 PROCEDURE — 2709999900 HC NON-CHARGEABLE SUPPLY: Performed by: INTERNAL MEDICINE

## 2023-01-12 PROCEDURE — 2580000003 HC RX 258: Performed by: ANESTHESIOLOGY

## 2023-01-12 PROCEDURE — 6360000002 HC RX W HCPCS

## 2023-01-12 PROCEDURE — 88305 TISSUE EXAM BY PATHOLOGIST: CPT

## 2023-01-12 PROCEDURE — 3609010600 HC COLONOSCOPY POLYPECTOMY SNARE/COLD BIOPSY: Performed by: INTERNAL MEDICINE

## 2023-01-12 PROCEDURE — 7100000010 HC PHASE II RECOVERY - FIRST 15 MIN: Performed by: INTERNAL MEDICINE

## 2023-01-12 RX ORDER — LIDOCAINE HYDROCHLORIDE 10 MG/ML
1 INJECTION, SOLUTION EPIDURAL; INFILTRATION; INTRACAUDAL; PERINEURAL
Status: DISCONTINUED | OUTPATIENT
Start: 2023-01-12 | End: 2023-01-12 | Stop reason: HOSPADM

## 2023-01-12 RX ORDER — SODIUM CHLORIDE, SODIUM LACTATE, POTASSIUM CHLORIDE, CALCIUM CHLORIDE 600; 310; 30; 20 MG/100ML; MG/100ML; MG/100ML; MG/100ML
INJECTION, SOLUTION INTRAVENOUS CONTINUOUS
Status: DISCONTINUED | OUTPATIENT
Start: 2023-01-12 | End: 2023-01-12 | Stop reason: HOSPADM

## 2023-01-12 RX ORDER — PROPOFOL 10 MG/ML
INJECTION, EMULSION INTRAVENOUS CONTINUOUS PRN
Status: DISCONTINUED | OUTPATIENT
Start: 2023-01-12 | End: 2023-01-12 | Stop reason: SDUPTHER

## 2023-01-12 RX ORDER — PROPOFOL 10 MG/ML
INJECTION, EMULSION INTRAVENOUS PRN
Status: DISCONTINUED | OUTPATIENT
Start: 2023-01-12 | End: 2023-01-12 | Stop reason: SDUPTHER

## 2023-01-12 RX ADMIN — Medication 50 MG: at 07:32

## 2023-01-12 RX ADMIN — SODIUM CHLORIDE, POTASSIUM CHLORIDE, SODIUM LACTATE AND CALCIUM CHLORIDE: 600; 310; 30; 20 INJECTION, SOLUTION INTRAVENOUS at 06:51

## 2023-01-12 RX ADMIN — PROPOFOL 150 MCG/KG/MIN: 10 INJECTION, EMULSION INTRAVENOUS at 07:32

## 2023-01-12 RX ADMIN — PROPOFOL 50 MG: 10 INJECTION, EMULSION INTRAVENOUS at 07:32

## 2023-01-12 ASSESSMENT — PAIN SCALES - GENERAL
PAINLEVEL_OUTOF10: 0

## 2023-01-12 ASSESSMENT — PAIN DESCRIPTION - DESCRIPTORS: DESCRIPTORS: GNAWING;ACHING

## 2023-01-12 ASSESSMENT — PAIN - FUNCTIONAL ASSESSMENT: PAIN_FUNCTIONAL_ASSESSMENT: 0-10

## 2023-01-12 ASSESSMENT — LIFESTYLE VARIABLES: SMOKING_STATUS: 1

## 2023-01-12 NOTE — H&P
Gastroenterology Note             Pre-operative History and Physical    Patient: Daija Paulino  : 1960  CSN:     History Obtained From:  patient and/or guardian. HISTORY OF PRESENT ILLNESS:    The patient is a 57 y.o. male  here for /colonoscopy. This a very pleasant 49-year-old male who called the office the other day told us that he was having uncontrollable diarrhea and was having 10-12 maybe 14 bowel movements a day he has been asking for prednisone he has a history of having inflammatory bowel disease we were trying to figure this out to him come in for an unprepped colonoscopy today    Past Medical History:    Past Medical History:   Diagnosis Date    Chronic back pain     Diverticulitis     DVT (deep venous thrombosis) ([facility] [address]     Hyperlipidemia     Irritable bowel syndrome     Type II or unspecified type diabetes mellitus without mention of complication, not stated as uncontrolled     Umbilical hernia     having surgery      Past Surgical History:    Past Surgical History:   Procedure Laterality Date    ABDOMINAL ADHESION SURGERY      ABDOMINAL WALL MESH  REMOVAL  10/13/2016    drainage of seroma    APPENDECTOMY  2014    CATARACT REMOVAL WITH IMPLANT Left 2015    COLECTOMY  1994    With Anastamosis related to diverticulitis    COLECTOMY      With anastamosis    COLONOSCOPY  2014    COLONOSCOPY  09/15/2021    [facility]    COLONOSCOPY  10/03/2014    COLONOSCOPY  2012    LEFT COLECTOMY  2014    Open left colectomy with appendectomy    OTHER SURGICAL HISTORY  2012    EXCISION OF RIGHT THYROID NODULE          THROAT SURGERY Bilateral 10/2013    TONSILLECTOMY      UPPER GASTROINTESTINAL ENDOSCOPY  02/10/2017    gastritis/duodenitis    VENTRAL HERNIA REPAIR  2015    With mesh     Medications Prior to Admission:   No current facility-administered medications on file prior to encounter.      Current Outpatient Medications on File Prior to Encounter   Medication Sig Dispense Refill    losartan-hydroCHLOROthiazide (HYZAAR) 50-12.5 MG per tablet TAKE 1 TABLET BY MOUTH EVERY DAY IN THE MORNING 90 tablet 2    insulin glargine (BASAGLAR KWIKPEN) 100 UNIT/ML injection pen 30 units twice a day 15 Adjustable Dose Pre-filled Pen Syringe 3    diazePAM (VALIUM) 5 MG tablet TAKE 1 TABLET BY MOUTH TWICE A DAY AS NEEDED FOR ANXIETY *TO LAST FULL 30 DAYS 50 tablet 0    Chlorpheniramine-Phenylephrine (SABI PO) Take 3 capsules by mouth 3 times daily (before meals)      gabapentin (NEURONTIN) 300 MG capsule Take 1 capsule by mouth 3 times daily for 30 days. 90 capsule 0    triamcinolone (KENALOG) 0.025 % cream Apply topically 2 times daily. 30 g 0    insulin lispro, 1 Unit Dial, (HUMALOG KWIKPEN) 100 UNIT/ML SOPN 15 units twice a day 45 Adjustable Dose Pre-filled Pen Syringe 3    metFORMIN (GLUCOPHAGE) 1000 MG tablet TAKE 1 TABLET BY MOUTH TWICE A  tablet 0    atorvastatin (LIPITOR) 40 MG tablet Take 1 tablet by mouth every day 90 tablet 0    blood glucose test strips (ACCU-CHEK GUIDE) strip 1 each by In Vitro route daily As needed.  100 each 5    Insulin Pen Needle (B-D UF III MINI PEN NEEDLES) 31G X 5 MM MISC Use up to 3 times a day 100 each 5    montelukast (SINGULAIR) 10 MG tablet TAKE 1 TABLET BY MOUTH EVERY DAY (Patient not taking: Reported on 2023) 90 tablet 1    Probiotic Product (PROBIOTIC-10 ULTIMATE PO) Take by mouth      dicyclomine (BENTYL) 10 MG capsule       aspirin 81 MG chewable tablet Take 81 mg by mouth daily          Allergies:  Codeine, Dilaudid [hydromorphone hcl], Fentanyl, and Toradol [ketorolac tromethamine]      Social History:   Social History     Tobacco Use    Smoking status: Former     Packs/day: 1.00     Years: 3.00     Pack years: 3.00     Types: Cigarettes     Quit date: 1980     Years since quittin.0    Smokeless tobacco: Never   Substance Use Topics    Alcohol use: No     Alcohol/week: 0.0 standard drinks Family History:   Family History   Problem Relation Age of Onset    Cancer Mother     Heart Disease Father     Cancer Other     Heart Disease Other        PHYSICAL EXAM:      BP (!) 156/94   Pulse 87   Temp 97.7 °F (36.5 °C) (Temporal)   Resp 18   Ht 6' (1.829 m)   Wt 205 lb (93 kg)   SpO2 95%   BMI 27.80 kg/m²  I        Heart:   RRR, normal s1s2    Lungs:  CTA bilat,  Normal effort    Abdomen:   NT, ND      ASA Grade:  ASA 3 - Patient with moderate systemic disease with functional limitations    Mallampati Class: 2          ASSESSMENT AND PLAN:    1. Patient is a 57 y.o. male here for /Colonoscopy with MAC.   2.  Procedure options, risks and benefits reviewed with patient. Patient expresses understanding.     Jose Mckoy MD,   [address]  1/12/2023

## 2023-01-12 NOTE — ANESTHESIA PRE PROCEDURE
Department of Anesthesiology  Preprocedure Note       Name:  Carrie Villar   Age:  57 y.o.  :  1960                                          MRN:  [id number]         Date:  2023      Surgeon: Bee Chamberlain):  Johnathan Erickson MD    Procedure: Procedure(s):  COLONOSCOPY    Medications prior to admission:   Prior to Admission medications    Medication Sig Start Date End Date Taking? Authorizing Provider   losartan-hydroCHLOROthiazide (HYZAAR) 50-12.5 MG per tablet TAKE 1 TABLET BY MOUTH EVERY DAY IN THE MORNING 23   Janneth Artis MD   insulin glargine (BASAGLAR KWIKPEN) 100 UNIT/ML injection pen 30 units twice a day 23   Miguel Snow DO   diazePAM (VALIUM) 5 MG tablet TAKE 1 TABLET BY MOUTH TWICE A DAY AS NEEDED FOR ANXIETY *TO LAST FULL 30 DAYS 23  Miguel Snow DO   Chlorpheniramine-Phenylephrine (SABI PO) Take 3 capsules by mouth 3 times daily (before meals)    Historical Provider, MD   gabapentin (NEURONTIN) 300 MG capsule Take 1 capsule by mouth 3 times daily for 30 days. 22  Noelle Kawasaki, APRN - CNP   triamcinolone (KENALOG) 0.025 % cream Apply topically 2 times daily. 22   Noelle Kawasaki, APRN - CNP   insulin lispro, 1 Unit Dial, (HUMALOG KWIKPEN) 100 UNIT/ML SOPN 15 units twice a day 22   Miguel Snow DO   metFORMIN (GLUCOPHAGE) 1000 MG tablet TAKE 1 TABLET BY MOUTH TWICE A DAY 22   Miguel Snow DO   atorvastatin (LIPITOR) 40 MG tablet Take 1 tablet by mouth every day 22   Miguel Snow DO   blood glucose test strips (ACCU-CHEK GUIDE) strip 1 each by In Vitro route daily As needed.  22   Miguel Snow DO   Insulin Pen Needle (B-D UF III MINI PEN NEEDLES) 31G X 5 MM MISC Use up to 3 times a day 22   Miguel Snow DO   montelukast (SINGULAIR) 10 MG tablet TAKE 1 TABLET BY MOUTH EVERY DAY  Patient not taking: Reported on 2023   Miguel Snow, DO   Probiotic Product (PROBIOTIC-10 ULTIMATE PO) Take by mouth Historical Provider, MD   dicyclomine (BENTYL) 10 MG capsule  9/28/17   Historical Provider, MD   aspirin 81 MG chewable tablet Take 81 mg by mouth daily    Historical Provider, MD       Current medications:    Current Facility-Administered Medications   Medication Dose Route Frequency Provider Last Rate Last Admin    lidocaine PF 1 % injection 1 mL  1 mL IntraDERmal Once PRN Jose Pelt, DO        lactated ringers infusion   IntraVENous Continuous Jose Pelt, DO 50 mL/hr at 01/12/23 0718 NoRateChange at 01/12/23 0718       Allergies: Allergies   Allergen Reactions    Codeine Hives and Nausea Only     25 years ago-unsure if allergy stated    Dilaudid [Hydromorphone Hcl]     Fentanyl      Patch caused skin irritation. IV does not bother pt has had fentanyl IV before.      Toradol [Ketorolac Tromethamine]        Problem List:    Patient Active Problem List   Diagnosis Code    Hypertension I10    Type 2 diabetes mellitus without complication ([facility]) C18.8    Chronic back pain M54.9, G89.29    Irritable bowel syndrome K58.9    Mixed hyperlipidemia E78.2    DJD (degenerative joint disease), lumbar M47.816    Actinic keratoses L57.0    Seborrheic keratosis L82.1    Hx of abdominal surgery Z98.890    Pain medication agreement signed Z02.89    Mild non obstructive coronary artery disease involving native coronary artery of native heart without angina pectoris I25.10    Chronic gastritis without bleeding K29.50    Vertigo R42    Primary osteoarthritis involving multiple joints M15.9    Anxiety F41.9    Cerebrovascular accident (CVA) ([facility]) I63.9    Left facial numbness R20.0    Dyslipidemia E78.5    Pancreatic insufficiency K86.89    Dizziness R42    Ulcerative rectosigmoiditis with rectal bleeding ([facility] [address] K51.311       Past Medical History:        Diagnosis Date    Chronic back pain     Diverticulitis     DVT (deep venous thrombosis) ([facility])     Hyperlipidemia     Irritable bowel syndrome     Type II or unspecified type diabetes mellitus without mention of complication, not stated as uncontrolled     Umbilical hernia     having surgery        Past Surgical History:        Procedure Laterality Date    ABDOMINAL ADHESION SURGERY      ABDOMINAL WALL MESH  REMOVAL  10/13/2016    drainage of seroma    APPENDECTOMY  2014    CATARACT REMOVAL WITH IMPLANT Left 2015    COLECTOMY  1994    With Anastamosis related to diverticulitis    COLECTOMY      With anastamosis    COLONOSCOPY  2014    [facility]    COLONOSCOPY  10/03/2014    COLONOSCOPY  2012    LEFT COLECTOMY  2014    Open left colectomy with appendectomy    OTHER SURGICAL HISTORY  2012    EXCISION OF RIGHT THYROID NODULE          THROAT SURGERY Bilateral 10/2013    TONSILLECTOMY      UPPER GASTROINTESTINAL ENDOSCOPY  02/10/2017    gastritis/duodenitis    VENTRAL HERNIA REPAIR  2015    With mesh       Social History:    Social History     Tobacco Use    Smoking status: Former     Packs/day: 1.00     Years: 3.00     Pack years: 3.00     Types: Cigarettes     Quit date: 1980     Years since quittin.0    Smokeless tobacco: Never   Substance Use Topics    Alcohol use: No     Alcohol/week: 0.0 standard drinks                                Counseling given: Not Answered      Vital Signs (Current):   Vitals:    23 0635   BP: (!) 156/94   Pulse: 87   Resp: 18   Temp: 97.7 °F (36.5 °C)   TempSrc: Temporal   SpO2: 95%   Weight: 205 lb (93 kg)   Height: 6' (1.829 m)                                              BP Readings from Last 3 Encounters:   23 (!) 156/94   22 130/62   22 130/72       NPO Status: Time of last liquid consumption:                         Time of last solid consumption: 1700 (5 bites of mashed potatos)                        Date of last liquid consumption: 23                        Date of last solid food consumption: 01/11/23    BMI:   Wt Readings from Last 3 Encounters:   01/12/23 205 lb (93 kg)   11/09/22 217 lb 6.4 oz (98.6 kg)   08/19/22 214 lb (97.1 kg)     Body mass index is 27.8 kg/m².     CBC:   Lab Results   Component Value Date/Time    WBC 7.0 01/10/2023 04:32 PM    RBC 4.83 01/10/2023 04:32 PM    HGB 14.6 01/10/2023 04:32 PM    HCT 45.4 01/10/2023 04:32 PM    MCV 94.0 01/10/2023 04:32 PM    RDW 13.6 01/10/2023 04:32 PM     01/10/2023 04:32 PM       CMP:   Lab Results   Component Value Date/Time     01/10/2023 04:32 PM    K 4.5 01/10/2023 04:32 PM    K 4.5 11/09/2022 04:46 PM     01/10/2023 04:32 PM    CO2 23 01/10/2023 04:32 PM    BUN 15 01/10/2023 04:32 PM    CREATININE 1.1 01/10/2023 04:32 PM    GFRAA >60 07/30/2022 08:12 AM    GFRAA >60 09/20/2012 07:03 PM    AGRATIO 1.4 01/10/2023 04:32 PM    LABGLOM >60 01/10/2023 04:32 PM    GLUCOSE 72 01/10/2023 04:32 PM    PROT 7.3 01/10/2023 04:32 PM    PROT 7.5 09/20/2012 07:03 PM    CALCIUM 9.5 01/10/2023 04:32 PM    BILITOT 0.3 01/10/2023 04:32 PM    ALKPHOS 50 01/10/2023 04:32 PM    AST 29 01/10/2023 04:32 PM    ALT 26 01/10/2023 04:32 PM       POC Tests:   Recent Labs     01/12/23  0652   POCGLU 79       Coags:   Lab Results   Component Value Date/Time    PROTIME 12.8 02/18/2021 03:02 PM    INR 1.10 02/18/2021 03:02 PM    APTT 30.0 02/18/2021 03:02 PM       HCG (If Applicable): No results found for: PREGTESTUR, PREGSERUM, HCG, HCGQUANT     ABGs: No results found for: PHART, PO2ART, DWW0LSJ, XNB0ZMH, BEART, A7BQVUMS     Type & Screen (If Applicable):  No results found for: LABABO, LABRH    Drug/Infectious Status (If Applicable):  No results found for: HIV, HEPCAB    COVID-19 Screening (If Applicable):   Lab Results   Component Value Date/Time    COVID19 Not Detected 02/18/2021 04:46 PM           Anesthesia Evaluation  Patient summary reviewed and Nursing notes reviewed no history of anesthetic complications:   Airway: Mallampati: II  TM distance: >3 FB     Mouth opening: > = 3 FB   Dental: normal exam         Pulmonary: breath sounds clear to auscultation  (+) current smoker (remote hx)                           Cardiovascular:  Exercise tolerance: good (>4 METS),   (+) hypertension: moderate, CAD:,       NYHA Classification: II    Rhythm: regular             Beta Blocker:  Not on Beta Blocker         Neuro/Psych:   (+) CVA (pt denies having ):,             GI/Hepatic/Renal:        (-) GERD      ROS comment: UC .   Endo/Other:    (+) DiabetesType II DM, well controlled, using insulin, . Abdominal:             Vascular: Other Findings:           Anesthesia Plan      MAC     ASA 3       Induction: intravenous. MIPS: Prophylactic antiemetics administered. Anesthetic plan and risks discussed with patient and spouse. Plan discussed with CRNA.     Attending anesthesiologist reviewed and agrees with Preprocedure content                Ioana Mckeon DO   1/12/2023

## 2023-01-12 NOTE — PROGRESS NOTES
Pre-op and post-op expectations explained and pt verbalizes understanding. No new questions at this time. Pt reports he was told not to do a prep due to his constant watery stool.

## 2023-01-12 NOTE — ANESTHESIA POSTPROCEDURE EVALUATION
Department of Anesthesiology  Postprocedure Note    Patient: Anu Cardenas  MRN: [id number]  YOB: 1960  Date of evaluation: 1/12/2023      Procedure Summary     Date: 01/12/23 Room / Location: [address] [place] [address] Jaida Olson 01 / [facility]    Anesthesia Start: 0730 Anesthesia Stop: 7299    Procedure: COLONOSCOPY POLYPECTOMY SNARE/COLD BIOPSY Diagnosis:       Ulcerative colitis without complications, unspecified location ([facility] [address]      (Ulcerative colitis without complications, unspecified location ([facility] [address] [K51.90])    Surgeons: German Zapata MD Responsible Provider: Philippe Whitaker DO    Anesthesia Type: MAC ASA Status: 3          Anesthesia Type: No value filed.     Cristine Phase I: Cristine Score: 10    Cristine Phase II: Cristine Score: 10      Anesthesia Post Evaluation    Patient location during evaluation: PACU  Patient participation: complete - patient participated  Level of consciousness: awake and alert  Pain score: 0  Airway patency: patent  Nausea & Vomiting: no nausea and no vomiting  Complications: no  Cardiovascular status: hemodynamically stable  Respiratory status: acceptable  Hydration status: stable

## 2023-01-12 NOTE — PROCEDURES
Colonoscopy Procedure  Note          Patient: Jong Fletcher  : 1960  CRN:  @GXO@    Procedure: Colonoscopy with biopsy    Date:  2023    Surgeon:  Alena Varghese MD, MD    Referring Physician:  Dary Snow DO    Preoperative Diagnosis:  Ulcerative colitis without complications, unspecified location [facility]) [K51.90]    Postoperative Diagnosis: Normal colonoscopy normal terminal ileum no evidence of active inflammatory bowel disease    Anesthesia:  MAC    EBL: Minimal to none. Indications: This is a 57y.o. year old male who called the office stating he was having severe diarrheal symptoms was having up to 10-12 bowel movements a day we had him come in today because he continues to want to be on and off prednisone so wanted to make sure because I tried or want him to be advanced to a different medication the prednisone so he is in for a unprepped colonoscopy    Procedure: An informed consent was obtained from the patient after explanation of indications, benefits, possible risks and complications of the procedure. The patient was then taken to the endoscopy suite, placed in the left lateral decubitus position, and the above IV anesthesia was administered. Digital rectal examination was performed. No masses good rectal tone      Rectum stool throughout the rectum but no evidence of inflammatory bowel disease    Sigmoid stool throughout the sigmoid no evidence of inflammatory bowel disease biopsies done    Descending stool and biopsies have been done    Transverse stool biopsies have been done    Ascending stool throughout biopsies have been done    Cecum stool throughout    TI normal.  Normal-appearing to terminal ileum biopsies have been done    Patient has had a colonic resection in the past      The patient tolerated the procedure well and was taken to the PACU in good condition. There were no immediate complications.       Impression: Stool throughout colon no evidence of active inflammatory bowel disease    Recommendations: At this time we do not see endoscopically any evidence of an active inflammatory bowel disease therefore we will not treat the patient with prednisone    We will discussed things with the patient we will put the patient on some antidiarrheals    To see if this will help and we will continue to follow him    Thank you for this kind referral    Alberto Patel MD, MD   [facility]  1/12/2023      Please note that some or all of this record was generated using voice recognition software. If there are any questions about the content of this document, please contact the author as some errors in translation may have occurred.

## 2023-01-16 ENCOUNTER — TELEPHONE (OUTPATIENT)
Dept: FAMILY MEDICINE CLINIC | Age: 63
End: 2023-01-16

## 2023-01-16 NOTE — TELEPHONE ENCOUNTER
Pt calling back pt called spoke with his insurance, they stated and provided him with a phone # that we can contact for a PA. [phone] OPT 2    Please advise on re ordering Lantus and starting a PA. Thank you . Please call pt once any information is available.

## 2023-01-16 NOTE — TELEPHONE ENCOUNTER
PA Needed for Lantus    Coverage information:     Subscriber: [id number] ANUP ALDANA     Rel to sub: 01 - Self     Member ID: [id number]     Payor: [employer] Ph: [phone]     Benefit plan: [id number] JOSEPH [facility] Ph: [phone]     Group number: Not given     Member effective dates: from 11/01/22

## 2023-01-16 NOTE — TELEPHONE ENCOUNTER
Received phone call from the pt, he stated that due to insurance reasons he can not take the Lantus so that the medication was changed to [employer]. Pt took 25 untis Saturday morning, pt stated that this has knocked him off his feet made the pt sick, couldn't hardly walk was laid up in bed all day light headed. Pt stated that he has an old box of Lantus that EXP 12/31/2022 pt stated that he is taking this in place of the Basaglar pt stated that he can not take the [employer] please advise on next steps pt stated that he is calling the insurance company for next steps can we do a PA of Lantus for pt?      Good call back #: [phone]

## 2023-01-19 ENCOUNTER — TELEPHONE (OUTPATIENT)
Dept: FAMILY MEDICINE CLINIC | Age: 63
End: 2023-01-19

## 2023-01-19 DIAGNOSIS — E11.9 TYPE 2 DIABETES MELLITUS WITHOUT COMPLICATION, WITH LONG-TERM CURRENT USE OF INSULIN (HCC): ICD-10-CM

## 2023-01-19 DIAGNOSIS — Z79.4 TYPE 2 DIABETES MELLITUS WITHOUT COMPLICATION, WITH LONG-TERM CURRENT USE OF INSULIN (HCC): ICD-10-CM

## 2023-01-19 RX ORDER — INSULIN GLARGINE 100 [IU]/ML
30 INJECTION, SOLUTION SUBCUTANEOUS 2 TIMES DAILY
Qty: 6 ADJUSTABLE DOSE PRE-FILLED PEN SYRINGE | Refills: 5 | Status: SHIPPED | OUTPATIENT
Start: 2023-01-19

## 2023-01-19 NOTE — TELEPHONE ENCOUNTER
Patient called in with new number for the PA insurance dept. Patient has been using his old insulin since Monday and doesn't think he is getting the same effect with the old insulin.  Phone number- [phone]  Fax- [phone]

## 2023-01-19 NOTE — TELEPHONE ENCOUNTER
The Lantus script is discontinued in patient's chart currently. Can you reorder this medication? Once it is reordered we can start the PA. Thanks Dr. Landry Chavez.

## 2023-01-23 NOTE — TELEPHONE ENCOUNTER
Submitted PA for Lantus SoloStar 100UNIT/ML pen-injectors, Key: [id number]. Mediation has been APPROVED. Please notify patient. Thank you.

## 2023-02-12 DIAGNOSIS — F51.02 ADJUSTMENT INSOMNIA: ICD-10-CM

## 2023-02-12 NOTE — TELEPHONE ENCOUNTER
.Refill Request - Controlled Substance    CONFIRM preferrred pharmacy with the patient. If Mail Order Rx - Pend for 90 day refill. Last Seen Department: 11/9/2022  Last Seen by PCP: 5/4/2022    Last Written: 1/8/23 50 with 0     Last UDS: 12/19/17    Med Agreement Signed On: 11/9/22    If no future appointment scheduled, route STAFF MESSAGE with patient name to the [facility] for scheduling. CONFIRM preferrred pharmacy with the patient. Next Appointment:   No future appointments. Message sent to [address] [place] [address] to schedule appt with patient?   YES      Requested Prescriptions     Pending Prescriptions Disp Refills    diazePAM (VALIUM) 5 MG tablet [Pharmacy Med Name: DIAZEPAM 5 MG TABLET] 50 tablet 0     Sig: TAKE 1 TABLET BY MOUTH TWICE A DAY AS NEEDED FOR ANXIETY *TO LAST FULL 30 DAYS

## 2023-02-13 RX ORDER — DIAZEPAM 5 MG/1
TABLET ORAL
Qty: 50 TABLET | Refills: 0 | Status: SHIPPED | OUTPATIENT
Start: 2023-02-13 | End: 2023-03-12

## 2023-02-14 NOTE — TELEPHONE ENCOUNTER
Scheduled with Dr. Dania Santiago because Dr. Estephanie Godoy is booked out until the end of June.  4/17/23 at 430pm

## 2023-03-15 DIAGNOSIS — F51.02 ADJUSTMENT INSOMNIA: ICD-10-CM

## 2023-03-15 RX ORDER — DIAZEPAM 5 MG/1
TABLET ORAL
Qty: 50 TABLET | Refills: 0 | Status: SHIPPED | OUTPATIENT
Start: 2023-03-15 | End: 2023-04-15

## 2023-03-15 NOTE — TELEPHONE ENCOUNTER
Refill Request - Controlled Substance    CONFIRM preferred pharmacy with the patient. If Mail Order Rx - Pend for 90 day refill. Last Seen Department: 11/9/2022    Last Seen by PCP: 5/4/2022    Last Written: 2/13/23 50 tablet 0 refills    Last UDS: 12/19/17     Med Agreement Signed On: 11/15/22    If no future appointment scheduled, route STAFF MESSAGE with patient name to the [facility] for scheduling. CONFIRM preferrred pharmacy with the patient. Next Appointment: 4/17/23  Future Appointments   Date Time Provider Barby Zepeda   4/17/2023  4:30 PM DO LUIS M [place] - [facility]       Message sent to [employer] to schedule appt with patient?   NO      Requested Prescriptions     Pending Prescriptions Disp Refills    diazePAM (VALIUM) 5 MG tablet [Pharmacy Med Name: DIAZEPAM 5 MG TABLET] 50 tablet 0     Sig: TAKE 1 TABLET BY MOUTH TWICE A DAY AS NEEDED FOR ANXIETY *TO LAST FULL 30 DAYS Surgery folder, bottle of chlorhexidine shower wash and instructions given and reviewed. Informed consent reviewed and signed.  Patient verbalized understanding.  Re-enforcement teaching will be needed at time of surgery.  Patient will be contacted within 3 business days with results of MSSA/MRSA swab.

## 2023-04-17 ENCOUNTER — OFFICE VISIT (OUTPATIENT)
Dept: FAMILY MEDICINE CLINIC | Age: 63
End: 2023-04-17
Payer: COMMERCIAL

## 2023-04-17 ENCOUNTER — TELEPHONE (OUTPATIENT)
Dept: FAMILY MEDICINE CLINIC | Age: 63
End: 2023-04-17

## 2023-04-17 VITALS
WEIGHT: 225 LBS | HEIGHT: 72 IN | SYSTOLIC BLOOD PRESSURE: 128 MMHG | HEART RATE: 73 BPM | DIASTOLIC BLOOD PRESSURE: 68 MMHG | OXYGEN SATURATION: 97 % | TEMPERATURE: 97.8 F | BODY MASS INDEX: 30.48 KG/M2

## 2023-04-17 DIAGNOSIS — E11.9 TYPE 2 DIABETES MELLITUS WITHOUT COMPLICATION, WITH LONG-TERM CURRENT USE OF INSULIN (HCC): Primary | ICD-10-CM

## 2023-04-17 DIAGNOSIS — Z79.4 TYPE 2 DIABETES MELLITUS WITHOUT COMPLICATION, WITH LONG-TERM CURRENT USE OF INSULIN (HCC): Primary | ICD-10-CM

## 2023-04-17 DIAGNOSIS — E78.2 MIXED HYPERLIPIDEMIA: ICD-10-CM

## 2023-04-17 DIAGNOSIS — I10 ESSENTIAL HYPERTENSION: ICD-10-CM

## 2023-04-17 PROCEDURE — 99214 OFFICE O/P EST MOD 30 MIN: CPT | Performed by: FAMILY MEDICINE

## 2023-04-17 PROCEDURE — 3074F SYST BP LT 130 MM HG: CPT | Performed by: FAMILY MEDICINE

## 2023-04-17 PROCEDURE — 3078F DIAST BP <80 MM HG: CPT | Performed by: FAMILY MEDICINE

## 2023-04-17 RX ORDER — FLUTICASONE PROPIONATE 50 MCG
2 SPRAY, SUSPENSION (ML) NASAL DAILY
Qty: 16 G | Refills: 5 | Status: SHIPPED | OUTPATIENT
Start: 2023-04-17

## 2023-04-17 NOTE — PROGRESS NOTES
pancreatitis. He will discuss with his gastroenterologist.  If he has to come off metformin, discussed increasing insulin to achieve fasting blood sugars of 150.    2. Essential hypertension  Stable. Continue current medications. 3. Mixed hyperlipidemia  Stable. Continue current medications. Return in about 6 months (around 10/17/2023) for Diabetes, Hypertension, Hyperlipidemia.

## 2023-04-17 NOTE — TELEPHONE ENCOUNTER
Left voicemail for patient to call the office back. Dr. Aure Rodarte would like to know if the patient would like to do vv since her is just getting medication refilled.

## 2023-04-20 DIAGNOSIS — F51.02 ADJUSTMENT INSOMNIA: ICD-10-CM

## 2023-04-20 NOTE — TELEPHONE ENCOUNTER
Pt would like referral to a doctor that can check his circulation and blood pressure in his legs/feet. He states that he is having problems with his feet getting cold. He has seen a doctor like this in the past and was referred to him by Dr. Corbin Joe. He cannot remember the name of the doctor. He says feet are also tingling.
154.9

## 2023-04-21 RX ORDER — DIAZEPAM 5 MG/1
TABLET ORAL
Qty: 50 TABLET | Refills: 0 | Status: SHIPPED | OUTPATIENT
Start: 2023-04-21 | End: 2023-05-21

## 2023-04-21 NOTE — TELEPHONE ENCOUNTER
Refill Request - Controlled Substance    CONFIRM preferred pharmacy with the patient. If Mail Order Rx - Pend for 90 day refill. Last Seen Department: 4/17/2023  Last Seen by PCP: 5/4/2022    Last Written: 03/15/2023 50 tablet 0 refills     Last UDS: 12/19/2017    Med Agreement Signed On: 11/15/2022    If no future appointment scheduled:  Review the last OV with PCP and review information for follow-up visit,  Route STAFF MESSAGE with patient name to the [facility] for scheduling with the following information:            -  Timing of next visit           -  Visit type ie Physical, OV, etc           -  Diagnoses/Reason ie. COPD, HTN - Do not use MEDICATION, Follow-up or CHECK UP - Give reason for visit        Next Appointment:   Future Appointments   Date Time Provider Barby Zepeda   10/18/2023  4:00 PM DO LUIS M [place] - DY[facility]       Message sent to [facility] to schedule appt with patient?   NO      Requested Prescriptions     Pending Prescriptions Disp Refills    diazePAM (VALIUM) 5 MG tablet [Pharmacy Med Name: DIAZEPAM 5 MG TABLET] 50 tablet 0     Sig: TAKE 1 TABLET BY MOUTH TWICE A DAY AS NEEDED FOR ANXIETY *TO LAST FULL 30 DAYS

## 2023-05-14 RX ORDER — FLUTICASONE PROPIONATE 50 MCG
SPRAY, SUSPENSION (ML) NASAL
Qty: 16 G | Refills: 5 | Status: SHIPPED | OUTPATIENT
Start: 2023-05-14

## 2023-05-22 ENCOUNTER — TELEPHONE (OUTPATIENT)
Dept: FAMILY MEDICINE CLINIC | Age: 63
End: 2023-05-22

## 2023-05-22 DIAGNOSIS — R63.4 WEIGHT LOSS: Primary | ICD-10-CM

## 2023-05-22 DIAGNOSIS — M79.10 MYALGIA: Primary | ICD-10-CM

## 2023-05-22 DIAGNOSIS — F51.02 ADJUSTMENT INSOMNIA: ICD-10-CM

## 2023-05-22 NOTE — TELEPHONE ENCOUNTER
Pt called in to make appt for myalgia and does not want to wait to be seen due to the pain. Wants to know if he can get in with Rossi Le tomorrow.

## 2023-05-23 ENCOUNTER — OFFICE VISIT (OUTPATIENT)
Dept: FAMILY MEDICINE CLINIC | Age: 63
End: 2023-05-23
Payer: COMMERCIAL

## 2023-05-23 VITALS — BODY MASS INDEX: 29.43 KG/M2 | WEIGHT: 217 LBS | OXYGEN SATURATION: 98 % | HEART RATE: 76 BPM

## 2023-05-23 DIAGNOSIS — R63.4 WEIGHT LOSS: ICD-10-CM

## 2023-05-23 DIAGNOSIS — K86.89 PANCREATIC INSUFFICIENCY: ICD-10-CM

## 2023-05-23 DIAGNOSIS — M79.10 MYALGIA: ICD-10-CM

## 2023-05-23 DIAGNOSIS — M79.18 BILATERAL BUTTOCK PAIN: Primary | ICD-10-CM

## 2023-05-23 DIAGNOSIS — K51.311 ULCERATIVE RECTOSIGMOIDITIS WITH RECTAL BLEEDING (HCC): ICD-10-CM

## 2023-05-23 LAB
ALBUMIN SERPL-MCNC: 4.7 G/DL (ref 3.4–5)
ALBUMIN/GLOB SERPL: 1.9 {RATIO} (ref 1.1–2.2)
ALP SERPL-CCNC: 51 U/L (ref 40–129)
ALT SERPL-CCNC: 16 U/L (ref 10–40)
ANION GAP SERPL CALCULATED.3IONS-SCNC: 12 MMOL/L (ref 3–16)
AST SERPL-CCNC: 20 U/L (ref 15–37)
BASOPHILS # BLD: 0 K/UL (ref 0–0.2)
BASOPHILS NFR BLD: 0.3 %
BILIRUB SERPL-MCNC: 0.4 MG/DL (ref 0–1)
BUN SERPL-MCNC: 11 MG/DL (ref 7–20)
CALCIUM SERPL-MCNC: 9.1 MG/DL (ref 8.3–10.6)
CHLORIDE SERPL-SCNC: 102 MMOL/L (ref 99–110)
CO2 SERPL-SCNC: 25 MMOL/L (ref 21–32)
CREAT SERPL-MCNC: 1.1 MG/DL (ref 0.8–1.3)
CRP SERPL HS-MCNC: 1.85 MG/L (ref 0.16–3)
CRP SERPL-MCNC: <3 MG/L (ref 0–5.1)
DEPRECATED RDW RBC AUTO: 12.9 % (ref 12.4–15.4)
EOSINOPHIL # BLD: 0 K/UL (ref 0–0.6)
EOSINOPHIL NFR BLD: 0.4 %
ERYTHROCYTE [SEDIMENTATION RATE] IN BLOOD BY WESTERGREN METHOD: 18 MM/HR (ref 0–20)
GFR SERPLBLD CREATININE-BSD FMLA CKD-EPI: >60 ML/MIN/{1.73_M2}
GLUCOSE SERPL-MCNC: 148 MG/DL (ref 70–99)
HCT VFR BLD AUTO: 39.6 % (ref 40.5–52.5)
HGB BLD-MCNC: 13.6 G/DL (ref 13.5–17.5)
LIPASE SERPL-CCNC: 6 U/L (ref 13–60)
LYMPHOCYTES # BLD: 2.4 K/UL (ref 1–5.1)
LYMPHOCYTES NFR BLD: 32.5 %
MAGNESIUM SERPL-MCNC: 0.8 MG/DL (ref 1.8–2.4)
MCH RBC QN AUTO: 30.3 PG (ref 26–34)
MCHC RBC AUTO-ENTMCNC: 34.5 G/DL (ref 31–36)
MCV RBC AUTO: 87.9 FL (ref 80–100)
MONOCYTES # BLD: 0.5 K/UL (ref 0–1.3)
MONOCYTES NFR BLD: 7.5 %
NEUTROPHILS # BLD: 4.3 K/UL (ref 1.7–7.7)
NEUTROPHILS NFR BLD: 59.3 %
PLATELET # BLD AUTO: 207 K/UL (ref 135–450)
PMV BLD AUTO: 9.6 FL (ref 5–10.5)
POTASSIUM SERPL-SCNC: 4.1 MMOL/L (ref 3.5–5.1)
PREALB SERPL-MCNC: 27 MG/DL (ref 20–40)
PROT SERPL-MCNC: 7.2 G/DL (ref 6.4–8.2)
RBC # BLD AUTO: 4.5 M/UL (ref 4.2–5.9)
SODIUM SERPL-SCNC: 139 MMOL/L (ref 136–145)
TSH SERPL DL<=0.005 MIU/L-ACNC: 0.76 UIU/ML (ref 0.27–4.2)
WBC # BLD AUTO: 7.3 K/UL (ref 4–11)

## 2023-05-23 PROCEDURE — 99214 OFFICE O/P EST MOD 30 MIN: CPT | Performed by: FAMILY MEDICINE

## 2023-05-23 RX ORDER — DIAZEPAM 5 MG/1
TABLET ORAL
Qty: 50 TABLET | Refills: 1 | Status: SHIPPED | OUTPATIENT
Start: 2023-05-23 | End: 2023-06-22

## 2023-05-23 RX ORDER — HYDROCODONE BITARTRATE AND ACETAMINOPHEN 5; 325 MG/1; MG/1
1 TABLET ORAL EVERY 6 HOURS PRN
Qty: 12 TABLET | Refills: 0 | Status: SHIPPED | OUTPATIENT
Start: 2023-05-23 | End: 2023-05-26

## 2023-05-23 ASSESSMENT — PATIENT HEALTH QUESTIONNAIRE - PHQ9
SUM OF ALL RESPONSES TO PHQ QUESTIONS 1-9: 9
2. FEELING DOWN, DEPRESSED OR HOPELESS: 0
6. FEELING BAD ABOUT YOURSELF - OR THAT YOU ARE A FAILURE OR HAVE LET YOURSELF OR YOUR FAMILY DOWN: 0
SUM OF ALL RESPONSES TO PHQ QUESTIONS 1-9: 9
SUM OF ALL RESPONSES TO PHQ QUESTIONS 1-9: 9
5. POOR APPETITE OR OVEREATING: 3
SUM OF ALL RESPONSES TO PHQ9 QUESTIONS 1 & 2: 1
4. FEELING TIRED OR HAVING LITTLE ENERGY: 2
8. MOVING OR SPEAKING SO SLOWLY THAT OTHER PEOPLE COULD HAVE NOTICED. OR THE OPPOSITE, BEING SO FIGETY OR RESTLESS THAT YOU HAVE BEEN MOVING AROUND A LOT MORE THAN USUAL: 0
SUM OF ALL RESPONSES TO PHQ QUESTIONS 1-9: 9
7. TROUBLE CONCENTRATING ON THINGS, SUCH AS READING THE NEWSPAPER OR WATCHING TELEVISION: 0
10. IF YOU CHECKED OFF ANY PROBLEMS, HOW DIFFICULT HAVE THESE PROBLEMS MADE IT FOR YOU TO DO YOUR WORK, TAKE CARE OF THINGS AT HOME, OR GET ALONG WITH OTHER PEOPLE: 1
3. TROUBLE FALLING OR STAYING ASLEEP: 3
1. LITTLE INTEREST OR PLEASURE IN DOING THINGS: 1
9. THOUGHTS THAT YOU WOULD BE BETTER OFF DEAD, OR OF HURTING YOURSELF: 0

## 2023-05-23 ASSESSMENT — ANXIETY QUESTIONNAIRES
IF YOU CHECKED OFF ANY PROBLEMS ON THIS QUESTIONNAIRE, HOW DIFFICULT HAVE THESE PROBLEMS MADE IT FOR YOU TO DO YOUR WORK, TAKE CARE OF THINGS AT HOME, OR GET ALONG WITH OTHER PEOPLE: NOT DIFFICULT AT ALL
4. TROUBLE RELAXING: 2
1. FEELING NERVOUS, ANXIOUS, OR ON EDGE: 1
7. FEELING AFRAID AS IF SOMETHING AWFUL MIGHT HAPPEN: 0
5. BEING SO RESTLESS THAT IT IS HARD TO SIT STILL: 0
6. BECOMING EASILY ANNOYED OR IRRITABLE: 0
GAD7 TOTAL SCORE: 5
2. NOT BEING ABLE TO STOP OR CONTROL WORRYING: 0
3. WORRYING TOO MUCH ABOUT DIFFERENT THINGS: 2

## 2023-05-23 ASSESSMENT — ENCOUNTER SYMPTOMS: BACK PAIN: 1

## 2023-05-23 NOTE — PROGRESS NOTES
Martina Vieira is a 57 y.o. male    Chief Complaint   Patient presents with    Muscle Pain     Buttocks       HPI:    Muscle Pain  This is a chronic problem. The current episode started more than 1 year ago. The problem occurs daily. The problem has been gradually worsening since onset. Associated with: history of pancreatitis leading to muscle atrophy. Pain location: bilateral buttock area/hip area. The pain is severe. Exacerbated by: sitting. Past treatments include acetaminophen. The treatment provided no relief. His past medical history is significant for chronic back pain. ROS:    Review of Systems   Musculoskeletal:  Positive for back pain. Pulse 76   Wt 217 lb (98.4 kg)   SpO2 98%   BMI 29.43 kg/m²     Physical Exam:    Physical Exam  Constitutional:       General: He is not in acute distress. Appearance: Normal appearance. He is not ill-appearing or toxic-appearing. HENT:      Head: Normocephalic. Musculoskeletal:      Comments: There does appear to be muscle atrophy in the buttocks bilaterally   Neurological:      Mental Status: He is alert. Psychiatric:         Mood and Affect: Mood normal.         Behavior: Behavior normal.         Thought Content: Thought content normal.       Current Outpatient Medications   Medication Sig Dispense Refill    HYDROcodone-acetaminophen (NORCO) 5-325 MG per tablet Take 1 tablet by mouth every 6 hours as needed for Pain for up to 3 days. Intended supply: 3 days.  Take lowest dose possible to manage pain Max Daily Amount: 4 tablets 12 tablet 0    diazePAM (VALIUM) 5 MG tablet TAKE 1 TABLET BY MOUTH TWICE A DAY AS NEEDED FOR ANXIETY *TO LAST FULL 30 DAYS 50 tablet 1    fluticasone (FLONASE) 50 MCG/ACT nasal spray SPRAY 2 SPRAYS INTO EACH NOSTRIL EVERY DAY 16 g 5    metFORMIN (GLUCOPHAGE) 1000 MG tablet TAKE 1 TABLET BY MOUTH TWICE A  tablet 1    insulin glargine (LANTUS SOLOSTAR) 100 UNIT/ML injection pen Inject 30 Units into the skin 2 times

## 2023-05-24 ENCOUNTER — TELEPHONE (OUTPATIENT)
Dept: FAMILY MEDICINE CLINIC | Age: 63
End: 2023-05-24

## 2023-05-25 LAB
EST. AVERAGE GLUCOSE BLD GHB EST-MCNC: 182.9 MG/DL
HBA1C MFR BLD: 8 %

## 2023-05-25 NOTE — TELEPHONE ENCOUNTER
Patient states he was just in to see dr Jo-Ann Wilson for the issue and was given something its not helping. He wants to know if there is something he can take or do to help to get more muscle?  He said he has tried everything and nothing is helping

## 2023-05-25 NOTE — TELEPHONE ENCOUNTER
Relayed message to pt. He is asking for Guerita Wheeler or Dr. Jasbir Gongora to call him in regards to issues he is still having with his muscle mass loss.

## 2023-06-09 DIAGNOSIS — M62.81 MUSCLE WEAKNESS: Primary | ICD-10-CM

## 2023-07-04 DIAGNOSIS — E83.42 HYPOMAGNESEMIA: Primary | ICD-10-CM

## 2023-07-14 DIAGNOSIS — E83.42 HYPOMAGNESEMIA: ICD-10-CM

## 2023-07-14 LAB — MAGNESIUM SERPL-MCNC: 1.1 MG/DL (ref 1.8–2.4)

## 2023-07-15 DIAGNOSIS — E83.42 HYPOMAGNESEMIA: Primary | ICD-10-CM

## 2023-07-26 DIAGNOSIS — F51.02 ADJUSTMENT INSOMNIA: ICD-10-CM

## 2023-07-26 RX ORDER — DIAZEPAM 5 MG/1
TABLET ORAL
Qty: 50 TABLET | Refills: 1 | Status: SHIPPED | OUTPATIENT
Start: 2023-07-26 | End: 2023-08-26

## 2023-08-17 RX ORDER — BLOOD SUGAR DIAGNOSTIC
1 STRIP MISCELLANEOUS DAILY
Qty: 100 EACH | Refills: 5 | Status: SHIPPED | OUTPATIENT
Start: 2023-08-17

## 2023-08-17 RX ORDER — INSULIN ASPART 100 [IU]/ML
INJECTION, SOLUTION INTRAVENOUS; SUBCUTANEOUS
Qty: 30 ML | Refills: 5 | Status: SHIPPED | OUTPATIENT
Start: 2023-08-17

## 2023-08-17 RX ORDER — LANCETS 30 GAUGE
1 EACH MISCELLANEOUS DAILY
Qty: 100 EACH | Refills: 3 | Status: SHIPPED | OUTPATIENT
Start: 2023-08-17

## 2023-08-17 NOTE — TELEPHONE ENCOUNTER
Received phone call from pt he stated that his insurance has changed again and will be going back to novolog. Pt stated that he is currently on steroid so he will be using his insulin more frequently I have pended all medications.

## 2023-08-17 NOTE — TELEPHONE ENCOUNTER
Pharmacy [facility] called and stated that they need to change the RX for humalog over to Novolog due to insurnace issues. Julio Rascon stated that the insurance will only cover Novolog now instead of humalog please advise.

## 2023-08-30 ENCOUNTER — TELEPHONE (OUTPATIENT)
Dept: FAMILY MEDICINE CLINIC | Age: 63
End: 2023-08-30

## 2023-08-30 DIAGNOSIS — I10 ESSENTIAL HYPERTENSION: ICD-10-CM

## 2023-08-30 DIAGNOSIS — R79.0 LOW MAGNESIUM LEVEL: ICD-10-CM

## 2023-08-30 DIAGNOSIS — E11.9 TYPE 2 DIABETES MELLITUS WITHOUT COMPLICATION, WITH LONG-TERM CURRENT USE OF INSULIN (HCC): Primary | ICD-10-CM

## 2023-08-30 DIAGNOSIS — Z12.5 SPECIAL SCREENING FOR MALIGNANT NEOPLASM OF PROSTATE: ICD-10-CM

## 2023-08-30 DIAGNOSIS — Z79.4 TYPE 2 DIABETES MELLITUS WITHOUT COMPLICATION, WITH LONG-TERM CURRENT USE OF INSULIN (HCC): Primary | ICD-10-CM

## 2023-08-30 NOTE — TELEPHONE ENCOUNTER
Pt called in stating he is coming in at some pint today to have labs done for his magnesium however he is asking if orders can be placed to have his Liver, PSA, and Kidney's checked as well. Please advise.

## 2023-08-31 LAB
ALBUMIN SERPL-MCNC: 4.6 G/DL (ref 3.4–5)
ALBUMIN/GLOB SERPL: 1.8 {RATIO} (ref 1.1–2.2)
ALP SERPL-CCNC: 49 U/L (ref 40–129)
ALT SERPL-CCNC: 17 U/L (ref 10–40)
ANION GAP SERPL CALCULATED.3IONS-SCNC: 12 MMOL/L (ref 3–16)
AST SERPL-CCNC: 17 U/L (ref 15–37)
BASOPHILS # BLD: 0 K/UL (ref 0–0.2)
BASOPHILS NFR BLD: 0.3 %
BILIRUB SERPL-MCNC: 0.4 MG/DL (ref 0–1)
BUN SERPL-MCNC: 36 MG/DL (ref 7–20)
CALCIUM SERPL-MCNC: 10.5 MG/DL (ref 8.3–10.6)
CHLORIDE SERPL-SCNC: 101 MMOL/L (ref 99–110)
CO2 SERPL-SCNC: 26 MMOL/L (ref 21–32)
CREAT SERPL-MCNC: 1.2 MG/DL (ref 0.8–1.3)
DEPRECATED RDW RBC AUTO: 13.4 % (ref 12.4–15.4)
EOSINOPHIL # BLD: 0 K/UL (ref 0–0.6)
EOSINOPHIL NFR BLD: 0 %
GFR SERPLBLD CREATININE-BSD FMLA CKD-EPI: >60 ML/MIN/{1.73_M2}
GLUCOSE SERPL-MCNC: 96 MG/DL (ref 70–99)
HCT VFR BLD AUTO: 41.2 % (ref 40.5–52.5)
HGB BLD-MCNC: 13.7 G/DL (ref 13.5–17.5)
LYMPHOCYTES # BLD: 2 K/UL (ref 1–5.1)
LYMPHOCYTES NFR BLD: 15 %
MAGNESIUM SERPL-MCNC: 1.2 MG/DL (ref 1.8–2.4)
MCH RBC QN AUTO: 29.8 PG (ref 26–34)
MCHC RBC AUTO-ENTMCNC: 33.3 G/DL (ref 31–36)
MCV RBC AUTO: 89.5 FL (ref 80–100)
MONOCYTES # BLD: 0.7 K/UL (ref 0–1.3)
MONOCYTES NFR BLD: 5 %
NEUTROPHILS # BLD: 10.4 K/UL (ref 1.7–7.7)
NEUTROPHILS NFR BLD: 79.7 %
PLATELET # BLD AUTO: 269 K/UL (ref 135–450)
PMV BLD AUTO: 9.9 FL (ref 5–10.5)
POTASSIUM SERPL-SCNC: 4.5 MMOL/L (ref 3.5–5.1)
PROT SERPL-MCNC: 7.1 G/DL (ref 6.4–8.2)
PSA SERPL DL<=0.01 NG/ML-MCNC: 0.96 NG/ML (ref 0–4)
RBC # BLD AUTO: 4.6 M/UL (ref 4.2–5.9)
SODIUM SERPL-SCNC: 139 MMOL/L (ref 136–145)
WBC # BLD AUTO: 13 K/UL (ref 4–11)

## 2023-09-08 NOTE — RESULT ENCOUNTER NOTE
He has yet to answer my questions and I put forward on September 1 about the magnesium and BUN-please try and contact them and get some answers-continue

## 2023-09-20 ENCOUNTER — TELEPHONE (OUTPATIENT)
Dept: FAMILY MEDICINE CLINIC | Age: 63
End: 2023-09-20

## 2023-09-20 NOTE — TELEPHONE ENCOUNTER
Patient called stating he is very concerned about his labs, states his blood sugars are reading over 300, states he is on prednisone right now for ulcerative colitis. States he is concerned for a kidney function. Patient wants to be seen by Dr. Joyce Medina or Dr. Ashley Lu, refused to see anyone else. I do not see an opening until 10/6/23. He is seeing the urologist on 10/6/23. Patient is very addiment about having an appt right now, states he does not want to wait.

## 2023-09-20 NOTE — TELEPHONE ENCOUNTER
Spoke with patient, he doesn't want to wait until early October to see Dr. Anand Barton or Julio Resendiz but he is requesting an appt with a male Doctor. Dr. David Leonard, would you be willing to see patient anytime next week in one of your provider fills? Let me know. Thank you.

## 2023-09-27 DIAGNOSIS — F51.02 ADJUSTMENT INSOMNIA: ICD-10-CM

## 2023-09-27 RX ORDER — DIAZEPAM 5 MG/1
TABLET ORAL
Qty: 50 TABLET | Refills: 1 | Status: SHIPPED | OUTPATIENT
Start: 2023-09-27 | End: 2023-10-27

## 2023-09-27 NOTE — TELEPHONE ENCOUNTER
Refill Request - Controlled Substance    CONFIRM preferred pharmacy with the patient. If Mail Order Rx - Pend for 90 day refill. Last Seen Department: 5/23/2023  Last Seen by PCP: 5/4/2022    Last Written: 07/26/23 50 with 3 refills    Last UDS: 12/19/17    Med Agreement Signed On: 06/05/15    If no future appointment scheduled:  Review the last OV with PCP and review information for follow-up visit,  Route STAFF MESSAGE with patient name to the [facility] for scheduling with the following information:            -  Timing of next visit           -  Visit type ie Physical, OV, etc           -  Diagnoses/Reason ie. COPD, HTN - Do not use MEDICATION, Follow-up or CHECK UP - Give reason for visit        Next Appointment:   Future Appointments   Date Time Provider [address] [place] [address]   10/18/2023  4:00 PM DO LUIS M Jean Baptiste  [place] - [place]       Message sent to [address] [place] [address] to schedule appt with patient?   NO      Requested Prescriptions     Pending Prescriptions Disp Refills    diazePAM (VALIUM) 5 MG tablet [Pharmacy Med Name: DIAZEPAM 5 MG TABLET] 50 tablet 1     Sig: TAKE 1 TABLET BY MOUTH TWICE A DAY AS NEEDED FOR ANXIETY *TO LAST FULL 30 DAYS

## 2023-09-29 DIAGNOSIS — I10 ESSENTIAL HYPERTENSION: Primary | ICD-10-CM

## 2023-10-11 ENCOUNTER — HOSPITAL ENCOUNTER (OUTPATIENT)
Dept: CT IMAGING | Age: 63
Discharge: HOME | End: 2023-10-11
Attending: INTERNAL MEDICINE
Payer: MEDICARE

## 2023-10-11 DIAGNOSIS — K50.919 CROHN'S DISEASE WITH COMPLICATION, UNSPECIFIED GASTROINTESTINAL TRACT LOCATION (HCC): ICD-10-CM

## 2023-10-11 PROCEDURE — 74177 CT ABD & PELVIS W/CONTRAST: CPT

## 2023-10-11 PROCEDURE — 6360000004 HC RX CONTRAST MEDICATION: Performed by: INTERNAL MEDICINE

## 2023-10-11 RX ADMIN — DIATRIZOATE MEGLUMINE AND DIATRIZOATE SODIUM 12 ML: 660; 100 LIQUID ORAL; RECTAL at 08:01

## 2023-10-11 RX ADMIN — IOPAMIDOL 75 ML: 755 INJECTION, SOLUTION INTRAVENOUS at 08:04

## 2023-10-13 NOTE — TELEPHONE ENCOUNTER
Refill Request     CONFIRM preferred pharmacy with the patient. If Mail Order Rx - Pend for 90 day refill. Last Seen: Last Seen Department: 5/23/2023  Last Seen by PCP: 5/23/2023    Last Written: 04/17/2023 180 tab 1 refills     If no future appointment scheduled:  Review the last OV with PCP and review information for follow-up visit,  Route STAFF MESSAGE with patient name to the [facility] for scheduling with the following information:            -  Timing of next visit           -  Visit type ie Physical, OV, etc           -  Diagnoses/Reason ie. COPD, HTN - Do not use MEDICATION, Follow-up or CHECK UP - Give reason for visit      Next Appointment:   Future Appointments   Date Time Provider [address] [place] [address]   10/18/2023  4:00 PM Miguel Snow, DO LUIS M Valdovinos - [place]       Message sent to [address] [place] [address] to schedule appt with patient?   NO      Requested Prescriptions     Pending Prescriptions Disp Refills    metFORMIN (GLUCOPHAGE) 1000 MG tablet [Pharmacy Med Name: METFORMIN HCL 1,000 MG TABLET] 180 tablet 1     Sig: TAKE 1 TABLET BY MOUTH TWICE A DAY

## 2023-10-18 ENCOUNTER — OFFICE VISIT (OUTPATIENT)
Dept: FAMILY MEDICINE CLINIC | Age: 63
End: 2023-10-18
Payer: MEDICARE

## 2023-10-18 VITALS
OXYGEN SATURATION: 96 % | WEIGHT: 203.6 LBS | BODY MASS INDEX: 27.61 KG/M2 | HEART RATE: 91 BPM | SYSTOLIC BLOOD PRESSURE: 118 MMHG | DIASTOLIC BLOOD PRESSURE: 70 MMHG

## 2023-10-18 DIAGNOSIS — F41.9 ANXIETY: ICD-10-CM

## 2023-10-18 DIAGNOSIS — Z79.4 TYPE 2 DIABETES MELLITUS WITHOUT COMPLICATION, WITH LONG-TERM CURRENT USE OF INSULIN (HCC): Primary | ICD-10-CM

## 2023-10-18 DIAGNOSIS — E11.9 TYPE 2 DIABETES MELLITUS WITHOUT COMPLICATION, WITH LONG-TERM CURRENT USE OF INSULIN (HCC): Primary | ICD-10-CM

## 2023-10-18 DIAGNOSIS — I10 ESSENTIAL HYPERTENSION: ICD-10-CM

## 2023-10-18 LAB — HBA1C MFR BLD: 7.6 %

## 2023-10-18 PROCEDURE — 3078F DIAST BP <80 MM HG: CPT | Performed by: FAMILY MEDICINE

## 2023-10-18 PROCEDURE — 1123F ACP DISCUSS/DSCN MKR DOCD: CPT | Performed by: FAMILY MEDICINE

## 2023-10-18 PROCEDURE — 99214 OFFICE O/P EST MOD 30 MIN: CPT | Performed by: FAMILY MEDICINE

## 2023-10-18 PROCEDURE — 3051F HG A1C>EQUAL 7.0%<8.0%: CPT | Performed by: FAMILY MEDICINE

## 2023-10-18 PROCEDURE — 3074F SYST BP LT 130 MM HG: CPT | Performed by: FAMILY MEDICINE

## 2023-10-18 PROCEDURE — 83036 HEMOGLOBIN GLYCOSYLATED A1C: CPT | Performed by: FAMILY MEDICINE

## 2023-10-18 RX ORDER — LIDOCAINE 50 MG/G
OINTMENT TOPICAL
Qty: 50 G | Refills: 1 | Status: SHIPPED | OUTPATIENT
Start: 2023-10-18

## 2023-10-18 ASSESSMENT — ENCOUNTER SYMPTOMS
SHORTNESS OF BREATH: 1
CHEST TIGHTNESS: 0
COUGH: 0
BLOOD IN STOOL: 0
VOMITING: 0
ABDOMINAL PAIN: 1
NAUSEA: 0
SORE THROAT: 0
CONSTIPATION: 0
RHINORRHEA: 0

## 2023-10-18 NOTE — PROGRESS NOTES
Conjunctiva/sclera: Conjunctivae normal.   Neck:      Thyroid: No thyromegaly. Vascular: No carotid bruit. Cardiovascular:      Rate and Rhythm: Normal rate and regular rhythm. Heart sounds: Normal heart sounds. Pulmonary:      Effort: Pulmonary effort is normal.      Breath sounds: Normal breath sounds. Abdominal:      Comments: He is currently dealing with a gastroenterologist for several abdominal issues and on steroids for 6 weeks. Musculoskeletal:      Cervical back: Neck supple. Right lower leg: No edema. Left lower leg: No edema. Lymphadenopathy:      Cervical: No cervical adenopathy. Skin:     General: Skin is warm and dry. Neurological:      Mental Status: He is alert and oriented to person, place, and time. Gait: Gait normal.   Psychiatric:         Mood and Affect: Mood normal.         Behavior: Behavior normal.         Thought Content: Thought content normal.         Judgment: Judgment normal.         Assessment:       Diagnosis Orders   1. Type 2 diabetes mellitus without complication, with long-term current use of insulin ([facility])  POCT glycosylated hemoglobin (Hb A1C)    Diabetic Foot Exam      2. Essential hypertension        3. Anxiety              Plan:      Ganesh Pack was seen today for 6 month follow-up. Diagnoses and all orders for this visit:    Type 2 diabetes mellitus without complication, with long-term current use of insulin ([facility])  -     POCT glycosylated hemoglobin (Hb A1C)  -     Diabetic Foot Exam  A1c 7.6-continue medications-monitor carbs as best you can-notify me of any persistent elevation of blood sugars-condition  Essential hypertension  Continue medications and no added salt diet-limit caffeine and preferably no alcohol-notify me of any persistent elevation of blood pressure-condition stable at this time  Anxiety  Continue medication and try and reduce when possible-notify me of any persistent increase in anxiety symptoms.   Other orders  -

## 2023-10-21 DIAGNOSIS — I10 PRIMARY HYPERTENSION: ICD-10-CM

## 2023-10-23 RX ORDER — LOSARTAN POTASSIUM AND HYDROCHLOROTHIAZIDE 12.5; 5 MG/1; MG/1
TABLET ORAL
Qty: 90 TABLET | Refills: 1 | Status: SHIPPED | OUTPATIENT
Start: 2023-10-23

## 2023-10-23 NOTE — TELEPHONE ENCOUNTER
10/23 Called [phone]. Spoke to pt. Stated that it would be a while before he would be able to make appt.  Has several upcoming procedures and does not know when he would be able to come in for annual appt with [facility]

## 2023-10-24 NOTE — PROGRESS NOTES
ENDOSCOPY PREOP INSTRUCTIONS      Please at arrival time given to you from your doctor's office. Report to the MAIN entrance on [address] and register at the surgery center on the left-hand side of the lobby  You will need your insurance card and photo id and a list of all medications taken on a regular basis. Please include the dose/frequency. For your procedure:     PLEASE FOLLOW ALL INSTRUCTIONS & PREPS GIVEN TO YOU BY YOUR DOCTOR'S OFFICE. If you have not received these instructions yet, please call the office immediately. Make sure to read them as soon as received. If you are taking blood thinners, Aspirin or diabetic medication, make sure to call your doctor as soon as possible for instructions prior to your procedure. Please dress comfortably and do not wear any lotion, powders or jewelry  If you use oxygen at home, please bring your oxygen tank with you to hospital.  Arrange for someone to be with you and sign you out & drive you home after your procedure. THIS PERSON MUST WAIT AT HOSPITAL THE ENTIRE TIME. We allow 2 adult visitors with you in the hospital & masks are strongly recommended.     WOMEN ONLY OF CHILDBEARING AGE: Please make sure to be able to give a urine sample on arrival      If you have further questions, you may contact your Endoscopist's office or Pre Admission Testing staff at [phone]

## 2023-10-25 ENCOUNTER — ANESTHESIA EVENT (OUTPATIENT)
Dept: ENDOSCOPY | Age: 63
End: 2023-10-25
Payer: MEDICARE

## 2023-10-25 ENCOUNTER — HOSPITAL ENCOUNTER (OUTPATIENT)
Age: 63
Setting detail: OUTPATIENT SURGERY
Discharge: HOME | End: 2023-10-25
Attending: INTERNAL MEDICINE | Admitting: INTERNAL MEDICINE
Payer: MEDICARE

## 2023-10-25 ENCOUNTER — ANESTHESIA (OUTPATIENT)
Dept: ENDOSCOPY | Age: 63
End: 2023-10-25
Payer: MEDICARE

## 2023-10-25 VITALS
TEMPERATURE: 97 F | SYSTOLIC BLOOD PRESSURE: 96 MMHG | OXYGEN SATURATION: 100 % | WEIGHT: 197 LBS | HEART RATE: 82 BPM | RESPIRATION RATE: 16 BRPM | DIASTOLIC BLOOD PRESSURE: 75 MMHG | BODY MASS INDEX: 26.68 KG/M2 | HEIGHT: 72 IN

## 2023-10-25 DIAGNOSIS — R19.7 DIARRHEA, UNSPECIFIED TYPE: ICD-10-CM

## 2023-10-25 DIAGNOSIS — K21.9 GASTROESOPHAGEAL REFLUX DISEASE, UNSPECIFIED WHETHER ESOPHAGITIS PRESENT: ICD-10-CM

## 2023-10-25 LAB
GLUCOSE BLD-MCNC: 120 MG/DL (ref 70–99)
PERFORMED ON: ABNORMAL

## 2023-10-25 PROCEDURE — 7100000010 HC PHASE II RECOVERY - FIRST 15 MIN: Performed by: INTERNAL MEDICINE

## 2023-10-25 PROCEDURE — 2580000003 HC RX 258: Performed by: NURSE ANESTHETIST, CERTIFIED REGISTERED

## 2023-10-25 PROCEDURE — 3609010300 HC COLONOSCOPY W/BIOPSY SINGLE/MULTIPLE: Performed by: INTERNAL MEDICINE

## 2023-10-25 PROCEDURE — 6360000002 HC RX W HCPCS: Performed by: NURSE ANESTHETIST, CERTIFIED REGISTERED

## 2023-10-25 PROCEDURE — 2580000003 HC RX 258: Performed by: ANESTHESIOLOGY

## 2023-10-25 PROCEDURE — 3700000001 HC ADD 15 MINUTES (ANESTHESIA): Performed by: INTERNAL MEDICINE

## 2023-10-25 PROCEDURE — 3609012400 HC EGD TRANSORAL BIOPSY SINGLE/MULTIPLE: Performed by: INTERNAL MEDICINE

## 2023-10-25 PROCEDURE — 2709999900 HC NON-CHARGEABLE SUPPLY: Performed by: INTERNAL MEDICINE

## 2023-10-25 PROCEDURE — 2500000003 HC RX 250 WO HCPCS: Performed by: NURSE ANESTHETIST, CERTIFIED REGISTERED

## 2023-10-25 PROCEDURE — 88305 TISSUE EXAM BY PATHOLOGIST: CPT

## 2023-10-25 PROCEDURE — 7100000011 HC PHASE II RECOVERY - ADDTL 15 MIN: Performed by: INTERNAL MEDICINE

## 2023-10-25 PROCEDURE — 3700000000 HC ANESTHESIA ATTENDED CARE: Performed by: INTERNAL MEDICINE

## 2023-10-25 RX ORDER — SODIUM CHLORIDE 0.9 % (FLUSH) 0.9 %
5-40 SYRINGE (ML) INJECTION EVERY 12 HOURS SCHEDULED
Status: DISCONTINUED | OUTPATIENT
Start: 2023-10-25 | End: 2023-10-25 | Stop reason: HOSPADM

## 2023-10-25 RX ORDER — SODIUM CHLORIDE, SODIUM LACTATE, POTASSIUM CHLORIDE, CALCIUM CHLORIDE 600; 310; 30; 20 MG/100ML; MG/100ML; MG/100ML; MG/100ML
INJECTION, SOLUTION INTRAVENOUS CONTINUOUS
Status: DISCONTINUED | OUTPATIENT
Start: 2023-10-25 | End: 2023-10-25 | Stop reason: HOSPADM

## 2023-10-25 RX ORDER — LIDOCAINE HYDROCHLORIDE 10 MG/ML
1 INJECTION, SOLUTION EPIDURAL; INFILTRATION; INTRACAUDAL; PERINEURAL
Status: DISCONTINUED | OUTPATIENT
Start: 2023-10-25 | End: 2023-10-25 | Stop reason: HOSPADM

## 2023-10-25 RX ORDER — PROPOFOL 10 MG/ML
INJECTION, EMULSION INTRAVENOUS PRN
Status: DISCONTINUED | OUTPATIENT
Start: 2023-10-25 | End: 2023-10-25 | Stop reason: SDUPTHER

## 2023-10-25 RX ORDER — SODIUM CHLORIDE 0.9 % (FLUSH) 0.9 %
5-40 SYRINGE (ML) INJECTION PRN
Status: DISCONTINUED | OUTPATIENT
Start: 2023-10-25 | End: 2023-10-25 | Stop reason: HOSPADM

## 2023-10-25 RX ORDER — LIDOCAINE HYDROCHLORIDE 20 MG/ML
INJECTION, SOLUTION EPIDURAL; INFILTRATION; INTRACAUDAL; PERINEURAL PRN
Status: DISCONTINUED | OUTPATIENT
Start: 2023-10-25 | End: 2023-10-25 | Stop reason: SDUPTHER

## 2023-10-25 RX ORDER — PANTOPRAZOLE SODIUM 40 MG/1
40 TABLET, DELAYED RELEASE ORAL
Qty: 90 TABLET | Refills: 1 | Status: SHIPPED | OUTPATIENT
Start: 2023-10-25

## 2023-10-25 RX ORDER — MESALAMINE 1000 MG/1
SUPPOSITORY RECTAL
COMMUNITY
Start: 2023-10-20

## 2023-10-25 RX ORDER — SODIUM CHLORIDE, SODIUM LACTATE, POTASSIUM CHLORIDE, CALCIUM CHLORIDE 600; 310; 30; 20 MG/100ML; MG/100ML; MG/100ML; MG/100ML
INJECTION, SOLUTION INTRAVENOUS CONTINUOUS PRN
Status: DISCONTINUED | OUTPATIENT
Start: 2023-10-25 | End: 2023-10-25 | Stop reason: SDUPTHER

## 2023-10-25 RX ADMIN — PROPOFOL 50 MG: 10 INJECTION, EMULSION INTRAVENOUS at 12:25

## 2023-10-25 RX ADMIN — PROPOFOL 50 MG: 10 INJECTION, EMULSION INTRAVENOUS at 12:10

## 2023-10-25 RX ADMIN — PROPOFOL 50 MG: 10 INJECTION, EMULSION INTRAVENOUS at 12:22

## 2023-10-25 RX ADMIN — PROPOFOL 50 MG: 10 INJECTION, EMULSION INTRAVENOUS at 12:18

## 2023-10-25 RX ADMIN — PROPOFOL 50 MG: 10 INJECTION, EMULSION INTRAVENOUS at 12:09

## 2023-10-25 RX ADMIN — PROPOFOL 50 MG: 10 INJECTION, EMULSION INTRAVENOUS at 12:14

## 2023-10-25 RX ADMIN — SODIUM CHLORIDE, POTASSIUM CHLORIDE, SODIUM LACTATE AND CALCIUM CHLORIDE: 600; 310; 30; 20 INJECTION, SOLUTION INTRAVENOUS at 10:57

## 2023-10-25 RX ADMIN — SODIUM CHLORIDE, SODIUM LACTATE, POTASSIUM CHLORIDE, AND CALCIUM CHLORIDE: .6; .31; .03; .02 INJECTION, SOLUTION INTRAVENOUS at 12:04

## 2023-10-25 RX ADMIN — SODIUM CHLORIDE, SODIUM LACTATE, POTASSIUM CHLORIDE, AND CALCIUM CHLORIDE: .6; .31; .03; .02 INJECTION, SOLUTION INTRAVENOUS at 12:10

## 2023-10-25 RX ADMIN — LIDOCAINE HYDROCHLORIDE 80 MG: 20 INJECTION, SOLUTION EPIDURAL; INFILTRATION; INTRACAUDAL; PERINEURAL at 12:09

## 2023-10-25 RX ADMIN — PROPOFOL 40 MG: 10 INJECTION, EMULSION INTRAVENOUS at 12:28

## 2023-10-25 ASSESSMENT — PAIN SCALES - GENERAL: PAINLEVEL_OUTOF10: 2

## 2023-10-25 ASSESSMENT — PAIN DESCRIPTION - LOCATION: LOCATION: ABDOMEN

## 2023-10-25 NOTE — ANESTHESIA POSTPROCEDURE EVALUATION
Department of Anesthesiology  Postprocedure Note    Patient: Pam Hubbard  MRN: [id number]  [address] [facility] [place]: 1960  Date of evaluation: 10/25/2023      Procedure Summary     Date: 10/25/23 Room / Location: [facility] 01 / The [address] [facility]    Anesthesia Start: 1682 Anesthesia Stop: 8450    Procedures:       EGD BIOPSY      COLONOSCOPY WITH BIOPSY Diagnosis:       Gastroesophageal reflux disease, unspecified whether esophagitis present      Diarrhea, unspecified type      (Gastroesophageal reflux disease, unspecified whether esophagitis present [K21.9])      (Diarrhea, unspecified type [R19.7])    Surgeons: Abhi Nova MD Responsible Provider: Manny Jones MD    Anesthesia Type: MAC ASA Status: 3          Anesthesia Type: No value filed.     Cristine Phase I: Cristine Score: 10    Cristine Phase II: Cristine Score: 10      Anesthesia Post Evaluation    Patient location during evaluation: PACU  Patient participation: complete - patient participated  Level of consciousness: awake and alert  Airway patency: patent  Nausea & Vomiting: no nausea and no vomiting  Complications: no  Cardiovascular status: hemodynamically stable  Respiratory status: acceptable  Hydration status: euvolemic  Multimodal analgesia pain management approach  Pain management: satisfactory to patient

## 2023-10-25 NOTE — PROCEDURES
Endoscopy Note    Patient: Karen Barros  : 1960  CSN:     Procedure: Esophagogastroduodenoscopy with biopsy    Date:  10/25/2023     Surgeon:  Patience Eid MD     Referring Physician:      Preoperative Diagnosis: Nausea abdominal pain vomiting    Postoperative Diagnosis: #1 a hiatal hernia #2 Tyrel's erosions #3 inflammation of the fundus posteriorly vomiting #4 inflammation of the antrum    Anesthesia: Monitored anesthesia care    EBL: <5 mL    Indications: This is a 70y.o. year old male who comes in today he had an accident at work where he hurt his back has been on disability he would have a lot sounds with upset stomach and vomiting with an upper endoscopy there is also been taking Tylenol and nonsteroidal anti-inflammatory medications    Description of Procedure:  Informed consent was obtained from the patient after explanation of indications, benefits and possible risks and complications of the procedure. The patient was then taken to the endoscopy suite, placed in the left lateral decubitus position and the above IV sedation was administrered. The Olympus videoendoscope was passed through the hypopharynx    Posterior pharynx was normal  Esophagus was normal  Hiatal hernia is 1 to 2 cm patient does have Tyrel's erosions  Stomach patient had inflammation of the fundus which week which could be related to all of his vomiting we did biopsy there is also inflammation of the antrum that we biopsied placed in separate container  Duodenum away by radiology report portion of the duodenum the duodenum was dilated but I do not find any other abnormalities of the duodenum  Retroflexion shows the hiatal hernia and Tyrel's erosions  Gastric or Duodenal ulcer present: No      The patient tolerated the procedure well and was taken to the post anesthesia care unit in good condition.       Impression: #1 hiatal hernia #2 Tyrel's #3 inflammation of the fundus #4 inflammation of the

## 2023-10-25 NOTE — H&P
Gastroenterology Note             Pre-operative History and Physical    Patient: Delfina Hernandez  : 1960  CSN:     History Obtained From:  patient and/or guardian. HISTORY OF PRESENT ILLNESS:    The patient is a 70 y.o. male  here for EGD/colonoscopy.    This is a 68-year-old male who is here for sometimes, who has inflammatory bowel disease he recently had a back injury he has been laid off from his job he is going to constantly but he will inflammatory bowel disease been really having has had a lot of persistent nausea doing upper and and lower endoscopy today    He has been on several rounds of prednisone    Past Medical History:    Past Medical History:   Diagnosis Date    Chronic back pain     Diverticulitis     DVT (deep venous thrombosis) ([address])     Hyperlipidemia     Irritable bowel syndrome     Type II or unspecified type diabetes mellitus without mention of complication, not stated as uncontrolled     Umbilical hernia     having surgery      Past Surgical History:    Past Surgical History:   Procedure Laterality Date    ABDOMINAL ADHESION SURGERY      ABDOMINAL WALL MESH  REMOVAL  10/13/2016    drainage of seroma    APPENDECTOMY  2014    CATARACT REMOVAL WITH IMPLANT Left 2015    COLECTOMY      With Anastamosis related to diverticulitis    COLECTOMY      With anastamosis    COLONOSCOPY  2014    COLONOSCOPY  09/15/2021    [facility]    COLONOSCOPY  10/03/2014    COLONOSCOPY  2012    COLONOSCOPY N/A 2023    COLONOSCOPY POLYPECTOMY SNARE/COLD BIOPSY performed by Nayan Daley MD at [address]  2014    Open left colectomy with appendectomy    OTHER SURGICAL HISTORY  2012    EXCISION OF RIGHT THYROID NODULE          THROAT SURGERY Bilateral 10/2013    TONSILLECTOMY      UPPER GASTROINTESTINAL ENDOSCOPY  02/10/2017    gastritis/duodenitis    VENTRAL HERNIA REPAIR  2015    With mesh     Medications Prior to

## 2023-10-25 NOTE — PROCEDURES
Colonoscopy Procedure  Note          Patient: Maciej Lance  : 1960  CRN:  @[email or web address]@    Procedure: Colonoscopy with biopsy    Date:  10/25/2023    Surgeon:  Lisette Yeboah MD, MD    Referring Physician:  Lola Luna DO    Preoperative Diagnosis:  Gastroesophageal reflux disease, unspecified whether esophagitis present [K21.9]  Diarrhea, unspecified type [R19.7]    Postoperative Diagnosis: Pancolitis mild to moderate  Anastomosis normal  Terminal ileum 20 cm normal    Anesthesia:  MAC    EBL: Minimal to none. Indications: This is a 70y.o. year old male who comes in because has been having excessive diarrhea with history of inflammatory bowel disease Crohn's colitis we have given him prednisone he has not improved so today were doing a colonoscopy to see if there is other issues going on    Procedure: An informed consent was obtained from the patient after explanation of indications, benefits, possible risks and complications of the procedure. The patient was then taken to the endoscopy suite, placed in the left lateral decubitus position, and the above IV anesthesia was administered. Digital rectal examination was performed. No masses good rectal tone      Rectum mild to moderate inflammation    Sigmoid mild to moderate inflammation multiple biopsies done    Descending anastomosis between here and 8; normal    Transverse this has been removed    Ascending inflammation mild to moderate biopsies done    Cecum inflammation    TI normal for 20 cm    Prep was excellent      The patient tolerated the procedure well and was taken to the PACU in good condition. There were no immediate complications. Impression: Pancolitis mild to moderate    Recommendations:  We will take 3 to 5 days for the biopsy    Given the continued pancolitis I think that it would be beneficial to try to see if we get this patient started on Stelara or Entyvio so we get this colitis under control    Thank you for this referral    Susan Roque MD, MD   [facility]  10/25/2023      Please note that some or all of this record was generated using voice recognition software. If there are any questions about the content of this document, please contact the author as some errors in translation may have occurred.

## 2023-10-25 NOTE — PROGRESS NOTES
Ambulatory Surgery/Procedure Discharge Note    Vitals:    10/25/23 1250   BP: 96/75   Pulse: 82   Resp:    Temp:    SpO2: 100%   Patient meets criteria for discharge per ritesh score. In: 1700 [I.V.:1700]  Out: -     Restroom use offered before discharge. Yes    Pain assessment:  none  Pain Level: 2    Pt and S.O./family states \"ready to go home\". Pt alert and oriented x4. IV removed. Denies N/V or pain. Voided prior to discharge. Pt tolerating po intake. Discharge instructions given to pt and daughter with pt permission. Pt and daughter, Malina Ocampo verbalized understanding of all instructions. Left with all belongings, X1 prescriptions, and discharge instructions. Patient discharged to home/self care.  Patient discharged via wheel chair by transporter to waiting family/S.O.       10/25/2023 1:10 PM

## 2023-10-28 DIAGNOSIS — E11.9 TYPE 2 DIABETES MELLITUS WITHOUT COMPLICATION, WITH LONG-TERM CURRENT USE OF INSULIN (HCC): ICD-10-CM

## 2023-10-28 DIAGNOSIS — Z79.4 TYPE 2 DIABETES MELLITUS WITHOUT COMPLICATION, WITH LONG-TERM CURRENT USE OF INSULIN (HCC): ICD-10-CM

## 2023-10-29 RX ORDER — INSULIN GLARGINE 100 [IU]/ML
30 INJECTION, SOLUTION SUBCUTANEOUS 2 TIMES DAILY
Qty: 6 ADJUSTABLE DOSE PRE-FILLED PEN SYRINGE | Refills: 5 | Status: SHIPPED | OUTPATIENT
Start: 2023-10-29

## 2023-11-02 ENCOUNTER — HOSPITAL ENCOUNTER (OUTPATIENT)
Age: 63
Discharge: HOME | End: 2023-11-02
Payer: MEDICARE

## 2023-11-02 LAB
HAV IGM SERPL QL IA: NORMAL
HBV CORE IGM SERPL QL IA: NORMAL
HBV SURFACE AG SERPL QL IA: NORMAL
HCV AB SERPL QL IA: NORMAL

## 2023-11-02 PROCEDURE — 80074 ACUTE HEPATITIS PANEL: CPT

## 2023-11-02 PROCEDURE — 86480 TB TEST CELL IMMUN MEASURE: CPT

## 2023-11-02 PROCEDURE — 36415 COLL VENOUS BLD VENIPUNCTURE: CPT

## 2023-11-05 DIAGNOSIS — R79.89 LOW TESTOSTERONE IN MALE: Primary | ICD-10-CM

## 2023-11-05 LAB
GAMMA INTERFERON BACKGROUND BLD IA-ACNC: 0.02 IU/ML
MITOGEN IGNF BCKGRD COR BLD-ACNC: >10 IU/ML
QUANTI TB GOLD PLUS: NEGATIVE
QUANTI TB1 MINUS NIL: 0 IU/ML (ref 0–0.34)
QUANTI TB2 MINUS NIL: 0 IU/ML (ref 0–0.34)

## 2023-11-21 RX ORDER — FLUTICASONE PROPIONATE 50 MCG
2 SPRAY, SUSPENSION (ML) NASAL DAILY
Qty: 16 G | Refills: 5 | Status: SHIPPED | OUTPATIENT
Start: 2023-11-21

## 2023-11-21 NOTE — TELEPHONE ENCOUNTER
Refill Request     CONFIRM preferred pharmacy with the patient. If Mail Order Rx - Pend for 90 day refill. Last Seen: Last Seen Department: 10/18/2023  Last Seen by PCP: 10/18/2023    Last Written: 23 with 5 refills     If no future appointment scheduled:  Review the last OV with PCP and review information for follow-up visit,  Route STAFF MESSAGE with patient name to the [facility] for scheduling with the following information:            -  Timing of next visit           -  Visit type ie Physical, OV, etc           -  Diagnoses/Reason ie. COPD, HTN - Do not use MEDICATION, Follow-up or CHECK UP - Give reason for visit      Next Appointment:   Future Appointments   Date Time Provider [address]   2024  9:30 AM Eleazar Snow, DO LUIS M Valdovinos - [place]       Message sent to [address] [place] [address] to schedule appt with patient?   NO      Requested Prescriptions     Pending Prescriptions Disp Refills    fluticasone (FLONASE) 50 MCG/ACT nasal spray 16 g 5     Si sprays by Each Nostril route daily

## 2023-12-04 DIAGNOSIS — F51.02 ADJUSTMENT INSOMNIA: ICD-10-CM

## 2023-12-04 RX ORDER — DIAZEPAM 5 MG/1
TABLET ORAL
Qty: 50 TABLET | Refills: 1 | Status: SHIPPED | OUTPATIENT
Start: 2023-12-04 | End: 2024-01-04

## 2023-12-04 NOTE — TELEPHONE ENCOUNTER
.Refill Request     CONFIRM preferred pharmacy with the patient. If Mail Order Rx - Pend for 90 day refill. Last Seen: Last Seen Department: 10/18/2023  Last Seen by PCP: 10/18/2023    Last Written: 9-27-23 50 with 1     If no future appointment scheduled:  Review the last OV with PCP and review information for follow-up visit,  Route STAFF MESSAGE with patient name to the [facility] for scheduling with the following information:            -  Timing of next visit           -  Visit type ie Physical, OV, etc           -  Diagnoses/Reason ie. COPD, HTN - Do not use MEDICATION, Follow-up or CHECK UP - Give reason for visit      Next Appointment:   Future Appointments   Date Time Provider [facility] [address]   4/24/2024  9:30 AM Heindl, Darla Cooks, DO LUIS M Valdovinos - [place]       Message sent to [facility] to schedule appt with patient?   N/A      Requested Prescriptions     Pending Prescriptions Disp Refills    diazePAM (VALIUM) 5 MG tablet [Pharmacy Med Name: DIAZEPAM 5 MG TABLET] 50 tablet 1     Sig: TAKE 1 TABLET BY MOUTH TWICE A DAY AS NEEDED FOR ANXIETY *TO LAST FULL 30 DAYS

## 2023-12-13 RX ORDER — INSULIN LISPRO 100 [IU]/ML
INJECTION, SOLUTION INTRAVENOUS; SUBCUTANEOUS
Status: CANCELLED | OUTPATIENT
Start: 2023-12-13

## 2023-12-14 RX ORDER — INSULIN LISPRO 100 [IU]/ML
INJECTION, SOLUTION INTRAVENOUS; SUBCUTANEOUS
Qty: 30 ML | Refills: 5 | Status: SHIPPED | OUTPATIENT
Start: 2023-12-14

## 2023-12-15 DIAGNOSIS — K85.00 IDIOPATHIC ACUTE PANCREATITIS WITHOUT INFECTION OR NECROSIS: Primary | ICD-10-CM

## 2023-12-16 ENCOUNTER — HOSPITAL ENCOUNTER (OUTPATIENT)
Age: 63
Discharge: HOME | End: 2023-12-16
Payer: MEDICARE

## 2023-12-16 DIAGNOSIS — R79.89 LOW TESTOSTERONE IN MALE: ICD-10-CM

## 2023-12-16 DIAGNOSIS — K85.00 IDIOPATHIC ACUTE PANCREATITIS WITHOUT INFECTION OR NECROSIS: ICD-10-CM

## 2023-12-16 LAB
ALBUMIN SERPL-MCNC: 4.2 G/DL (ref 3.4–5)
ALBUMIN/GLOB SERPL: 1.3 {RATIO} (ref 1.1–2.2)
ALP SERPL-CCNC: 53 U/L (ref 40–129)
ALT SERPL-CCNC: 16 U/L (ref 10–40)
ANION GAP SERPL CALCULATED.3IONS-SCNC: 11 MMOL/L (ref 3–16)
AST SERPL-CCNC: 16 U/L (ref 15–37)
BILIRUB SERPL-MCNC: 0.6 MG/DL (ref 0–1)
BUN SERPL-MCNC: 23 MG/DL (ref 7–20)
CALCIUM SERPL-MCNC: 9.1 MG/DL (ref 8.3–10.6)
CHLORIDE SERPL-SCNC: 104 MMOL/L (ref 99–110)
CO2 SERPL-SCNC: 25 MMOL/L (ref 21–32)
CREAT SERPL-MCNC: 1.1 MG/DL (ref 0.8–1.3)
CRP SERPL-MCNC: <3 MG/L (ref 0–5.1)
GFR SERPLBLD CREATININE-BSD FMLA CKD-EPI: >60 ML/MIN/{1.73_M2}
GLUCOSE SERPL-MCNC: 133 MG/DL (ref 70–99)
LIPASE SERPL-CCNC: 7 U/L (ref 13–60)
POTASSIUM SERPL-SCNC: 4.3 MMOL/L (ref 3.5–5.1)
PROT SERPL-MCNC: 7.4 G/DL (ref 6.4–8.2)
SODIUM SERPL-SCNC: 140 MMOL/L (ref 136–145)

## 2023-12-16 PROCEDURE — 84403 ASSAY OF TOTAL TESTOSTERONE: CPT

## 2023-12-16 PROCEDURE — 86140 C-REACTIVE PROTEIN: CPT

## 2023-12-16 PROCEDURE — 36415 COLL VENOUS BLD VENIPUNCTURE: CPT

## 2023-12-16 PROCEDURE — 83690 ASSAY OF LIPASE: CPT

## 2023-12-16 PROCEDURE — 80053 COMPREHEN METABOLIC PANEL: CPT

## 2024-01-08 ENCOUNTER — TELEMEDICINE (OUTPATIENT)
Dept: PRIMARY CARE CLINIC | Age: 64
End: 2024-01-08
Payer: MEDICARE

## 2024-01-08 DIAGNOSIS — R06.02 SOB (SHORTNESS OF BREATH): ICD-10-CM

## 2024-01-08 DIAGNOSIS — U07.1 COVID-19: Primary | ICD-10-CM

## 2024-01-08 PROCEDURE — 1123F ACP DISCUSS/DSCN MKR DOCD: CPT | Performed by: NURSE PRACTITIONER

## 2024-01-08 RX ORDER — BENZONATATE 100 MG/1
100-200 CAPSULE ORAL 3 TIMES DAILY PRN
Qty: 30 CAPSULE | Refills: 0 | Status: SHIPPED | OUTPATIENT
Start: 2024-01-08 | End: 2024-01-15

## 2024-01-08 RX ORDER — ONDANSETRON 4 MG/1
4 TABLET, FILM COATED ORAL 3 TIMES DAILY PRN
Qty: 15 TABLET | Refills: 0 | Status: SHIPPED | OUTPATIENT
Start: 2024-01-08

## 2024-01-08 ASSESSMENT — ENCOUNTER SYMPTOMS
SHORTNESS OF BREATH: 0
SINUS PRESSURE: 1
SORE THROAT: 1
DIARRHEA: 0
NAUSEA: 1
COUGH: 1
VOMITING: 0
TROUBLE SWALLOWING: 0
WHEEZING: 0
CHEST TIGHTNESS: 0

## 2024-01-08 NOTE — PROGRESS NOTES
if applicable) is aware that this is a billable service, which includes applicable co-pays. This Virtual Visit was conducted with patient's (and/or legal guardian's) consent. Patient identification was verified, and a caregiver was present when appropriate.   The patient was located at Home: [address] [place] [address]  Provider was located at Other: HOME:FL         --AMADA Ricks CNP on 1/8/2024 at 5:19 PM    An electronic signature was used to authenticate this note.

## 2024-01-08 NOTE — PATIENT INSTRUCTIONS
Your recent COVID test was positive.     You are a candidate for Paxlovid. Allergies, Medications, and labs reviewed.    STOP ATORVASTATIN FOR 5 DAYS WHILE TAKING PAXLOVID. MAY RESUME ATORVASTATIN 3 DAYS AFTER YOU FINISH WITH PAXLOVID.     Currently, the CDC recommends staying at home in self isolation for at least 5 days. After that, if you are without a fever and symptoms are improving, you may go out, but only if wearing a mask for an additional 5 days.     Thankfully, most people recover uneventfully. The mainstay of treatment for most people remains focused on symptom control, isolating and watching for signs of worsening illness. You should drink plenty of fluids, eat when you are able, and rest as much as possible.    Recommend treating symptoms with OTC medications: Flonase for congestions, Mucinex for Phlegm, Robitussin DM or Delsym for cough, Tylenol/Ibuprofen for fever/body aches. Cepacol lozenges and saltwater gargles for sore throat.      Recommend Vitamin D, C and Zinc.    We do not prescribe antibiotics for viral illnesses; however, we can treat secondary infections should the need arise.    Please seek more urgent medical attention if you develop any of the following:  Chest pain  Shortness of breath  Bluish lips or face  Severe headache   Severe dizziness or passing out  New confusion  Inability to stay awake  Extreme weakness    If you have a pulse oximeter, check it at least daily. Seek urgent medical attention if it drops to 92% or below.

## 2024-01-10 ENCOUNTER — TELEPHONE (OUTPATIENT)
Dept: FAMILY MEDICINE CLINIC | Age: 64
End: 2024-01-10

## 2024-01-10 DIAGNOSIS — K51.90 ULCERATIVE COLITIS WITHOUT COMPLICATIONS, UNSPECIFIED LOCATION (HCC): Primary | ICD-10-CM

## 2024-01-10 RX ORDER — HYDROCODONE BITARTRATE AND ACETAMINOPHEN 5; 325 MG/1; MG/1
1 TABLET ORAL EVERY 8 HOURS PRN
Qty: 15 TABLET | Refills: 0 | Status: SHIPPED | OUTPATIENT
Start: 2024-01-10 | End: 2024-01-15

## 2024-01-10 NOTE — TELEPHONE ENCOUNTER
Please see if patient is currently taking mesalamine suppository or has he previously used any other rectal suppositories to help with flares

## 2024-01-10 NOTE — TELEPHONE ENCOUNTER
Spoke with patient and he states he is using sesanta Suppositories now that are prescribed by UC but they are not helping with  the discomfort. Please advise as to the next steps he should take. Thank you

## 2024-01-10 NOTE — TELEPHONE ENCOUNTER
Patient: Zafar Chong Patient : 1960      Patient call back number- [phone]     Spoke with: Patient    Symptom(s) patient is experiencing- rectal pain/burning (not bleeding), currently has a flare-up of ulcerative colitis causing diarrhea, GI is out of office,     Symptom onset has been acute for a time period of 3 day(s). Severity is described as moderate. Course of his symptoms over time is worsening.    Does anything make the symptom(s) better or worse?- yes - laying on stomach    Have you experienced this before?-yes     Any pertinent medical history? Ulceratvie colitis    Have you taken anything (prescriptions or OTC) to help with symptom(s)?- no   - If YES, did it help?- No      Dr. Snow previously prescribed Hydrocortisone 2.5% cream     Patient starts injections for UC next week.

## 2024-01-11 NOTE — TELEPHONE ENCOUNTER
Dr. Snow is out of the office today.  Was given given hydrocortisone rectal cream before?  If so we can just refill that if his pharmacy sends us a refill request.  Otherwise we can send him in the hydrocortisone 2.5% cream that he was given before and use rectally.

## 2024-01-11 NOTE — TELEPHONE ENCOUNTER
Patient called back to follow up on the potential medication refill for Hydrocortisone. Patient is not willing to come in for an appt. Patient did mention that he is COVID positive.

## 2024-01-13 DIAGNOSIS — R06.02 SOB (SHORTNESS OF BREATH): ICD-10-CM

## 2024-01-13 RX ORDER — ALBUTEROL SULFATE 90 UG/1
2 AEROSOL, METERED RESPIRATORY (INHALATION) EVERY 6 HOURS PRN
Qty: 18 G | Refills: 0 | Status: SHIPPED | OUTPATIENT
Start: 2024-01-13 | End: 2024-01-13 | Stop reason: SDUPTHER

## 2024-01-13 RX ORDER — ALBUTEROL SULFATE 90 UG/1
2 AEROSOL, METERED RESPIRATORY (INHALATION) EVERY 6 HOURS PRN
Qty: 18 G | Refills: 0 | Status: SHIPPED | OUTPATIENT
Start: 2024-01-13

## 2024-01-14 ENCOUNTER — PATIENT MESSAGE (OUTPATIENT)
Dept: FAMILY MEDICINE CLINIC | Age: 64
End: 2024-01-14

## 2024-01-15 ENCOUNTER — TELEMEDICINE (OUTPATIENT)
Dept: PRIMARY CARE CLINIC | Age: 64
End: 2024-01-15
Payer: MEDICARE

## 2024-01-15 ENCOUNTER — TELEPHONE (OUTPATIENT)
Dept: FAMILY MEDICINE CLINIC | Age: 64
End: 2024-01-15

## 2024-01-15 DIAGNOSIS — U07.1 COVID-19: Primary | ICD-10-CM

## 2024-01-15 DIAGNOSIS — K62.89 ANAL BURNING: ICD-10-CM

## 2024-01-15 PROCEDURE — 1123F ACP DISCUSS/DSCN MKR DOCD: CPT | Performed by: NURSE PRACTITIONER

## 2024-01-15 PROCEDURE — 99213 OFFICE O/P EST LOW 20 MIN: CPT | Performed by: NURSE PRACTITIONER

## 2024-01-15 RX ORDER — HYDROCORTISONE ACETATE 25 MG/1
25 SUPPOSITORY RECTAL EVERY 12 HOURS
Qty: 24 SUPPOSITORY | Refills: 0 | Status: SHIPPED | OUTPATIENT
Start: 2024-01-15

## 2024-01-15 NOTE — TELEPHONE ENCOUNTER
FYI- looks like they sent a suppository in but pharmacy stated it needs a PA. Pt has been dealing with this for a few days please advise on sending in a cream that can be used \"internally\" not \"externally\" thank you

## 2024-01-15 NOTE — TELEPHONE ENCOUNTER
From: Zafar Chong  To: Dr. Rikki Waddell  Sent: 1/14/2024 1:18 PM EST  Subject: Hydrocortisone     Dr Zapata’s ,   There seems to be a mix up I explained to staff the cream I need is for internal use Dr Sanchez perscribed cream with a applicator the irritation I’m having is inside, I have delt with this for many years since I had my first colon surgery it gets irritated where they made the connection and burns usually a couple applications and it’s gone I couldn’t wait to use it this is very painful but normal for me could you please call in something for this I have used it many times in the past please let me know this is unfortunately something that is not fixable a simple application usually handle it you don’t see it prescribed much because one tube lasts me a very long time

## 2024-01-15 NOTE — PROGRESS NOTES
Zafar Chong (:  1960) is a Established patient, here for evaluation of the following:    URI (Pt was COVID+ on , pt states that he still just feels \"icky\", still has lots of brown mucus/congestion, coughing spells, using his inhaler, mucinex, and Vicks congestion relief, tylenol. Pt wants to know if he can be cleared to get his UC infusion. ) and Rectal Pain (Pt also states that because he has been on so many medications his rectum is burning)       Assessment & Plan:  Below is the assessment and plan developed based on review of pertinent history, physical exam, labs, studies, and medications.  1. COVID-19  Continue increased fluids, mucinex, vitamin d, zinc, and vitamin c  2. Anal burning  -     hydrocortisone (ANUSOL-HC) 25 MG suppository; Place 1 suppository rectally in the morning and 1 suppository in the evening., Disp-24 suppository, R-0Normal    No follow-ups on file.  The patient would benefit from future follow up with their usual PCP. As of the end of their Virtualist Visit today, follow up visit status is as follows: Patient to follow up as previously instructed by PCP    Subjective:   Patient was seen today because he tested positive for covid on . He wants to be cleared to go to his infusion for his ulcerative colitis. He has had severe burning in his anus and the infusion helps it to not burn. He has been quarantine for the 5 days required by [facility]> He is fever free and is feeling better every day. With a mask he should be able to go get his infusion.      Review of Systems    Objective:  Patient-Reported Vitals  No data recorded         2021     3:33 PM   Patient-Reported Vitals   Patient-Reported Weight 199   Patient-Reported Height 6 0   Patient-Reported Systolic 122 mmHg   Patient-Reported Diastolic 81 mmHg        Physical Exam:  [INSTRUCTIONS:  \"[x]\" Indicates a positive item  \"[]\" Indicates a negative item  -- DELETE ALL ITEMS NOT EXAMINED]    Constitutional: [x] Appears

## 2024-01-16 NOTE — TELEPHONE ENCOUNTER
Received fax from [facility] pharmacy.    \" They do not supply just applicators. Either needs to use applicator tip that comes with anal HC or find elsewhere.\"    I did start a PA for the suppository. Still awaiting response

## 2024-01-16 NOTE — TELEPHONE ENCOUNTER
Patient called in stated that he just wants a cream for internal use for burning in his rectum. States that is is like someone is holding a lighter his rectum. He is in pain and stated that even with the PA his out of pocket will be more than he can afford for the suppository. Please advise as to what he can do to asap.

## 2024-01-16 NOTE — TELEPHONE ENCOUNTER
Submitted PA for Hydrocortisone Acetate 25MG suppositories  Via [facility] Key: [id number] STATUS: PENDING.    Follow up done daily; if no decision with in three days we will refax.  If another three days goes by with no decision will call the insurance for status.

## 2024-01-17 NOTE — TELEPHONE ENCOUNTER
Spoke to the pharmacy, they state that Proctosol-HC come with an applicator and can be used internally.     Script pending.

## 2024-01-18 ENCOUNTER — PATIENT MESSAGE (OUTPATIENT)
Dept: FAMILY MEDICINE CLINIC | Age: 64
End: 2024-01-18

## 2024-01-18 RX ORDER — HYDROCORTISONE 25 MG/G
CREAM TOPICAL
Qty: 1 EACH | Refills: 0 | Status: SHIPPED | OUTPATIENT
Start: 2024-01-18

## 2024-01-18 NOTE — TELEPHONE ENCOUNTER
Spoke with the patient, informed him that we have not sent in a new medication and he should call the pharmacy for confirmation.

## 2024-01-18 NOTE — TELEPHONE ENCOUNTER
From: Zafar Chong  To: Dr. Miguel Maldonado  Sent: 1/18/2024 1:03 PM EST  Subject: Insinpro    There was a perscription sent to [facility] for a different insulin I just filled 90days of lantis and humalog this one says 15 units before each meal I’m not sure why or who changed this can you help me out with this it was sent by dr maldonado????

## 2024-01-19 ENCOUNTER — PATIENT MESSAGE (OUTPATIENT)
Dept: FAMILY MEDICINE CLINIC | Age: 64
End: 2024-01-19

## 2024-01-19 NOTE — TELEPHONE ENCOUNTER
From: Zafar Chong  To: Dr. Rikki Waddell  Sent: 1/19/2024 1:33 PM EST  Subject: Recent medication sent to [facility]    I just got a text [facility] they need more info on a prescription starting with H I’m am totally lost here the cream they ordered that was supposed to be in this morning is becoming hard for them to locate could someone contact them for what ever info there asking for thank you

## 2024-01-29 ENCOUNTER — TELEPHONE (OUTPATIENT)
Dept: FAMILY MEDICINE CLINIC | Age: 64
End: 2024-01-29

## 2024-01-29 NOTE — TELEPHONE ENCOUNTER
No chest pain no SOB just feels like his chest is heavy and that the back of his throat is sore he stated that when he went to get the Entyvio he felt fine but afterwards he has felt these symptoms. He stated that he is just checking he don't know much about the meds and that is why he called. He stated that he just feels winded like he can't get his full breath if he sits down relaxes and he can get a full breath in he just feels like it is heavy. He stated that he still has the inhaler that he was prescribed while having COVID. He stated that the nurse from the infusion place advised to have his oxygen checked she stated that it sounds like it is COVID symptoms lingering. Offered pt an appt, he stated that he is going to give it a couple days before making an appt and will call back if anything changes.

## 2024-01-29 NOTE — TELEPHONE ENCOUNTER
Patient calling because he has been getting over covid for the past 4 weeks. Patient stated that he got his entyvio infusion last Monday and since then he has been having SOB and a \" heavy chest.\" Patient is wondering if that was caused from the infusion or if he needed an appt? Please Advise

## 2024-01-30 ENCOUNTER — OFFICE VISIT (OUTPATIENT)
Dept: FAMILY MEDICINE CLINIC | Age: 64
End: 2024-01-30
Payer: MEDICARE

## 2024-01-30 VITALS
SYSTOLIC BLOOD PRESSURE: 138 MMHG | DIASTOLIC BLOOD PRESSURE: 70 MMHG | HEART RATE: 86 BPM | BODY MASS INDEX: 28.24 KG/M2 | WEIGHT: 208.2 LBS | OXYGEN SATURATION: 96 % | TEMPERATURE: 97.5 F

## 2024-01-30 DIAGNOSIS — R06.02 SHORTNESS OF BREATH: ICD-10-CM

## 2024-01-30 DIAGNOSIS — R06.09 DYSPNEA ON EXERTION: Primary | ICD-10-CM

## 2024-01-30 PROCEDURE — 3078F DIAST BP <80 MM HG: CPT | Performed by: NURSE PRACTITIONER

## 2024-01-30 PROCEDURE — 99213 OFFICE O/P EST LOW 20 MIN: CPT | Performed by: NURSE PRACTITIONER

## 2024-01-30 PROCEDURE — 3075F SYST BP GE 130 - 139MM HG: CPT | Performed by: NURSE PRACTITIONER

## 2024-01-30 PROCEDURE — 1123F ACP DISCUSS/DSCN MKR DOCD: CPT | Performed by: NURSE PRACTITIONER

## 2024-01-30 SDOH — ECONOMIC STABILITY: INCOME INSECURITY: HOW HARD IS IT FOR YOU TO PAY FOR THE VERY BASICS LIKE FOOD, HOUSING, MEDICAL CARE, AND HEATING?: HARD

## 2024-01-30 SDOH — ECONOMIC STABILITY: FOOD INSECURITY: WITHIN THE PAST 12 MONTHS, THE FOOD YOU BOUGHT JUST DIDN'T LAST AND YOU DIDN'T HAVE MONEY TO GET MORE.: NEVER TRUE

## 2024-01-30 SDOH — ECONOMIC STABILITY: FOOD INSECURITY: WITHIN THE PAST 12 MONTHS, YOU WORRIED THAT YOUR FOOD WOULD RUN OUT BEFORE YOU GOT MONEY TO BUY MORE.: NEVER TRUE

## 2024-01-30 ASSESSMENT — ANXIETY QUESTIONNAIRES
IF YOU CHECKED OFF ANY PROBLEMS ON THIS QUESTIONNAIRE, HOW DIFFICULT HAVE THESE PROBLEMS MADE IT FOR YOU TO DO YOUR WORK, TAKE CARE OF THINGS AT HOME, OR GET ALONG WITH OTHER PEOPLE: NOT DIFFICULT AT ALL
7. FEELING AFRAID AS IF SOMETHING AWFUL MIGHT HAPPEN: 0
3. WORRYING TOO MUCH ABOUT DIFFERENT THINGS: 0
2. NOT BEING ABLE TO STOP OR CONTROL WORRYING: 0
5. BEING SO RESTLESS THAT IT IS HARD TO SIT STILL: 0
6. BECOMING EASILY ANNOYED OR IRRITABLE: 0
GAD7 TOTAL SCORE: 0
1. FEELING NERVOUS, ANXIOUS, OR ON EDGE: 0
4. TROUBLE RELAXING: 0

## 2024-01-30 ASSESSMENT — PATIENT HEALTH QUESTIONNAIRE - PHQ9
3. TROUBLE FALLING OR STAYING ASLEEP: 0
9. THOUGHTS THAT YOU WOULD BE BETTER OFF DEAD, OR OF HURTING YOURSELF: 0
SUM OF ALL RESPONSES TO PHQ QUESTIONS 1-9: 2
SUM OF ALL RESPONSES TO PHQ QUESTIONS 1-9: 2
2. FEELING DOWN, DEPRESSED OR HOPELESS: 0
SUM OF ALL RESPONSES TO PHQ QUESTIONS 1-9: 2
5. POOR APPETITE OR OVEREATING: 1
1. LITTLE INTEREST OR PLEASURE IN DOING THINGS: 0
7. TROUBLE CONCENTRATING ON THINGS, SUCH AS READING THE NEWSPAPER OR WATCHING TELEVISION: 0
SUM OF ALL RESPONSES TO PHQ QUESTIONS 1-9: 2
SUM OF ALL RESPONSES TO PHQ9 QUESTIONS 1 & 2: 0
6. FEELING BAD ABOUT YOURSELF - OR THAT YOU ARE A FAILURE OR HAVE LET YOURSELF OR YOUR FAMILY DOWN: 0
8. MOVING OR SPEAKING SO SLOWLY THAT OTHER PEOPLE COULD HAVE NOTICED. OR THE OPPOSITE, BEING SO FIGETY OR RESTLESS THAT YOU HAVE BEEN MOVING AROUND A LOT MORE THAN USUAL: 0
10. IF YOU CHECKED OFF ANY PROBLEMS, HOW DIFFICULT HAVE THESE PROBLEMS MADE IT FOR YOU TO DO YOUR WORK, TAKE CARE OF THINGS AT HOME, OR GET ALONG WITH OTHER PEOPLE: 0
4. FEELING TIRED OR HAVING LITTLE ENERGY: 1

## 2024-01-30 ASSESSMENT — ENCOUNTER SYMPTOMS
COUGH: 1
SORE THROAT: 1
NAUSEA: 0
SINUS PAIN: 0
SHORTNESS OF BREATH: 1
DIARRHEA: 0
VOMITING: 0
SINUS PRESSURE: 0
RHINORRHEA: 0
ABDOMINAL PAIN: 0

## 2024-01-30 NOTE — PROGRESS NOTES
Zafar Chong (:  1960) is a 63 y.o. male,Established patient, here for evaluation of the following chief complaint(s):  Shortness of Breath       ASSESSMENT/PLAN:  1. Dyspnea on exertion  -     XR CHEST STANDARD (2 VW); Future  2. Shortness of breath  -     XR CHEST STANDARD (2 VW); Future  -Wells score 0.   -Continue use of inhaler for SOB as needed. Educated on proper use of inhaler with teach back method. Offered spacer, pt declined.  -Discussed alarm symptoms related to severe SOB, chest pain/tightness and worsening symptoms.   -CXR to rule out pneumonia or other acute condition   -Discussed cardiac causes, discussed EKG in office and stress test. Patient reports last stress test in , states he sees his cardiologist next week and will wait to talk to them. Advised if symptoms continue and cardiology rules out the heart then would consider CT chest and pulmonology referral. He verbalized understanding   -Six  minute walk was discussed but patient has a dentist appointment and can't wait to do this test.   -Go to ER if severe symptoms occur. Discussed alarm symptoms   -Call office for refill of inhaler as needed.    Return if symptoms worsen or fail to improve.       Subjective   SUBJECTIVE/OBJECTIVE:  COVID 5 weeks ago.  Presents with SOB on exertion x5 weeks.  On inhaler, stopped using, wasn't sure if he could keep using.  Flonase nightly.  Hx of father with MI at age 50.  Gets Entyvio infusions biweekly for UC. Concern infusion was causing SOB, however contact clinic and they stated it was unlikely and to consult PCP.   at beginning of visit, recheck was 86.  Former smoker.    Shortness of Breath  Associated symptoms include headaches and a sore throat. Pertinent negatives include no abdominal pain, chest pain, ear pain, fever, leg swelling, rhinorrhea or vomiting.       Review of Systems   Constitutional:  Negative for chills, fatigue and fever.   HENT:  Positive for sore throat. Negative

## 2024-02-01 DIAGNOSIS — R06.02 SOB (SHORTNESS OF BREATH): ICD-10-CM

## 2024-02-01 RX ORDER — ALBUTEROL SULFATE 90 UG/1
AEROSOL, METERED RESPIRATORY (INHALATION)
Qty: 18 EACH | OUTPATIENT
Start: 2024-02-01

## 2024-02-02 DIAGNOSIS — K51.90 ULCERATIVE COLITIS WITHOUT COMPLICATIONS, UNSPECIFIED LOCATION (HCC): ICD-10-CM

## 2024-02-02 RX ORDER — HYDROCODONE BITARTRATE AND ACETAMINOPHEN 5; 325 MG/1; MG/1
1 TABLET ORAL EVERY 8 HOURS PRN
Qty: 15 TABLET | Refills: 0 | Status: SHIPPED | OUTPATIENT
Start: 2024-02-02 | End: 2024-02-07

## 2024-02-02 NOTE — TELEPHONE ENCOUNTER
Patient calling in requesting a 5 day supply of pain meds to get him Tuesday so he can get his infusion. Had a Rx sent fr tramadol but didn't fill it due to it making him dizzy. Please advise

## 2024-02-07 NOTE — PROGRESS NOTES
File Prior to Visit   Medication Sig Dispense Refill    ENTYVIO 300 MG injection       insulin lispro, 1 Unit Dial, (HUMALOG KWIKPEN) 100 UNIT/ML SOPN Up to 15 units bid ac-according to sliding scale. 30 mL 5    fluticasone (FLONASE) 50 MCG/ACT nasal spray 2 sprays by Each Nostril route daily 16 g 5    LANTUS SOLOSTAR 100 UNIT/ML injection pen INJECT 30 UNITS INTO THE SKIN 2 TIMES DAILY 6 Adjustable Dose Pre-filled Pen Syringe 5    pantoprazole (PROTONIX) 40 MG tablet Take 1 tablet by mouth every morning (before breakfast) 90 tablet 1    metFORMIN (GLUCOPHAGE) 1000 MG tablet TAKE 1 TABLET BY MOUTH TWICE A  tablet 1    blood glucose test strips (ACCU-CHEK GUIDE) strip 1 each by In Vitro route daily As needed. 100 each 5    Lancets MISC 1 each by Does not apply route daily 100 each 3    atorvastatin (LIPITOR) 40 MG tablet Take 1 tablet by mouth every day 90 tablet 0    Insulin Pen Needle (B-D UF III MINI PEN NEEDLES) 31G X 5 MM MISC Use up to 3 times a day 100 each 5    dicyclomine (BENTYL) 10 MG capsule       aspirin 81 MG chewable tablet Take 1 tablet by mouth daily      PROCTOSOL HC 2.5 % CREA rectal cream Apply rectal twice daily, will need applicator. (Patient not taking: Reported on 2/8/2024) 1 each 0    Applicators MISC 1 applicator by Does not apply route in the morning and at bedtime (Patient not taking: Reported on 2/8/2024) 20 each 0    hydrocortisone (ANUSOL-HC) 25 MG suppository Place 1 suppository rectally in the morning and 1 suppository in the evening. (Patient not taking: Reported on 2/8/2024) 24 suppository 0    albuterol sulfate HFA (VENTOLIN HFA) 108 (90 Base) MCG/ACT inhaler Inhale 2 puffs into the lungs every 6 hours as needed for Shortness of Breath or Wheezing (Patient not taking: Reported on 2/8/2024) 18 g 0    hydrocortisone 2.5 % cream Apply topically 2 times daily. (Patient not taking: Reported on 2/8/2024) 15 g 0    ondansetron (ZOFRAN) 4 MG tablet Take 1 tablet by mouth 3 times

## 2024-02-08 ENCOUNTER — OFFICE VISIT (OUTPATIENT)
Dept: CARDIOLOGY CLINIC | Age: 64
End: 2024-02-08
Payer: MEDICARE

## 2024-02-08 VITALS
OXYGEN SATURATION: 98 % | WEIGHT: 207.5 LBS | SYSTOLIC BLOOD PRESSURE: 128 MMHG | BODY MASS INDEX: 28.1 KG/M2 | DIASTOLIC BLOOD PRESSURE: 70 MMHG | HEIGHT: 72 IN | HEART RATE: 61 BPM

## 2024-02-08 DIAGNOSIS — R94.31 ABNORMAL EKG: ICD-10-CM

## 2024-02-08 DIAGNOSIS — R00.1 BRADYCARDIA: ICD-10-CM

## 2024-02-08 DIAGNOSIS — R42 DIZZINESS: ICD-10-CM

## 2024-02-08 DIAGNOSIS — I25.10 CORONARY ARTERY DISEASE INVOLVING NATIVE CORONARY ARTERY OF NATIVE HEART WITHOUT ANGINA PECTORIS: Primary | ICD-10-CM

## 2024-02-08 DIAGNOSIS — I10 PRIMARY HYPERTENSION: ICD-10-CM

## 2024-02-08 DIAGNOSIS — E78.2 MIXED HYPERLIPIDEMIA: ICD-10-CM

## 2024-02-08 PROCEDURE — 99214 OFFICE O/P EST MOD 30 MIN: CPT | Performed by: INTERNAL MEDICINE

## 2024-02-08 PROCEDURE — 3074F SYST BP LT 130 MM HG: CPT | Performed by: INTERNAL MEDICINE

## 2024-02-08 PROCEDURE — 1123F ACP DISCUSS/DSCN MKR DOCD: CPT | Performed by: INTERNAL MEDICINE

## 2024-02-08 PROCEDURE — 3078F DIAST BP <80 MM HG: CPT | Performed by: INTERNAL MEDICINE

## 2024-02-08 PROCEDURE — 93000 ELECTROCARDIOGRAM COMPLETE: CPT | Performed by: INTERNAL MEDICINE

## 2024-02-08 RX ORDER — LOSARTAN POTASSIUM AND HYDROCHLOROTHIAZIDE 12.5; 5 MG/1; MG/1
1 TABLET ORAL EVERY MORNING
Qty: 90 TABLET | Refills: 3 | Status: SHIPPED | OUTPATIENT
Start: 2024-02-08

## 2024-02-08 RX ORDER — VEDOLIZUMAB 300 MG/5ML
INJECTION, POWDER, LYOPHILIZED, FOR SOLUTION INTRAVENOUS
COMMUNITY
Start: 2024-02-02

## 2024-02-08 NOTE — PATIENT INSTRUCTIONS
Plan:  1. Order cardiac event monitor 28 days ~ dizziness, bradycardia   2. Patient given detailed instructions on addressing diet, regular exercise, weight control, smoking abstention, medication compliance, and stress minimization. The patient was provided written and verbal instructions regarding risk factor modification.    3. I recommend that the patient continue their currently prescribed medications. Their drug modifiable risk factors appear to be well controlled. I will continue to address the need/dosing of medications in future visits.   4. Follow up in 1 year

## 2024-02-09 ENCOUNTER — TELEPHONE (OUTPATIENT)
Dept: CARDIOLOGY CLINIC | Age: 64
End: 2024-02-09

## 2024-02-09 DIAGNOSIS — I25.10 CORONARY ARTERY DISEASE INVOLVING NATIVE CORONARY ARTERY OF NATIVE HEART WITHOUT ANGINA PECTORIS: ICD-10-CM

## 2024-02-09 DIAGNOSIS — I10 PRIMARY HYPERTENSION: Primary | ICD-10-CM

## 2024-02-09 NOTE — TELEPHONE ENCOUNTER
Pt states his insurance will not cover vital connect monitor. Pt states he cannot afford monitor even if office can get it authorized by insurance. Pt would like to know what he should do because insurance. Spoke with Adri who said pt can drop monitor back off in office and she can cancel order so he wouldn't be billed. Per Adri she will be looking into situation about billing.

## 2024-02-09 NOTE — TELEPHONE ENCOUNTER
I agree with this plan.     Lets plan for him to get a GXT Myoview.    Go ahead and cancel the monitor for now.

## 2024-02-09 NOTE — TELEPHONE ENCOUNTER
I spoke with the patient. He cannot afford the co pay for the monitor. He returned the monitor to us, and it was given to Humaira(rep)  He is asking if [facility]SP would like a stress test maybe? He asked the insurance company how much a stress would be and it's $30 out of pocket.     Thank you

## 2024-02-12 ENCOUNTER — TELEPHONE (OUTPATIENT)
Dept: CARDIOLOGY CLINIC | Age: 64
End: 2024-02-12

## 2024-02-12 NOTE — TELEPHONE ENCOUNTER
CS wasn`t clear on which stress test to schedule for the pt. CS was advise we would call the pt once VSP clears up which stress test he wants for the pt. Please advise

## 2024-02-12 NOTE — TELEPHONE ENCOUNTER
Called and spoke with patient. Relayed VSP instructions he VU. GXT myoview order placed. CS phone number provided to patient and stress test instructions.

## 2024-02-17 DIAGNOSIS — F51.02 ADJUSTMENT INSOMNIA: ICD-10-CM

## 2024-02-17 NOTE — TELEPHONE ENCOUNTER
Refill Request - Controlled Substance    CONFIRM preferred pharmacy with the patient.    If Mail Order Rx - Pend for 90 day refill.        Last Seen Department: 1/30/2024  Last Seen by PCP: 10/18/2023    Last Written: 12/4/2023    Last UDS: 12/19/2017    Med Agreement Signed On: 11/9/2022    If no future appointment scheduled:  Review the last OV with PCP and review information for follow-up visit,  Route STAFF MESSAGE with patient name to the  Pool for scheduling with the following information:            -  Timing of next visit           -  Visit type ie Physical, OV, etc           -  Diagnoses/Reason ie. COPD, HTN - Do not use MEDICATION, Follow-up or CHECK UP - Give reason for visit        Next Appointment:   Future Appointments   Date Time Provider Department Center   2/27/2024  7:45 AM [facility] NUCLEAR STRESS ROOM [facility] [place]   4/24/2024  9:30 AM Miguel Snow, DO [facility] [place] [facility]       Message sent to  to schedule appt with patient?  NO      Requested Prescriptions     Pending Prescriptions Disp Refills    diazePAM (VALIUM) 5 MG tablet [Pharmacy Med Name: DIAZEPAM 5 MG TABLET] 50 tablet 1     Sig: TAKE 1 TABLET BY MOUTH TWICE A DAY AS NEEDED FOR ANXIETY *TO LAST FULL 30 DAYS

## 2024-02-19 RX ORDER — DIAZEPAM 5 MG/1
TABLET ORAL
Qty: 50 TABLET | Refills: 0 | Status: SHIPPED | OUTPATIENT
Start: 2024-02-19 | End: 2024-03-18

## 2024-02-26 ENCOUNTER — TELEPHONE (OUTPATIENT)
Dept: FAMILY MEDICINE CLINIC | Age: 64
End: 2024-02-26

## 2024-02-26 DIAGNOSIS — E83.42 HYPOMAGNESEMIA: Primary | ICD-10-CM

## 2024-02-26 NOTE — TELEPHONE ENCOUNTER
Patient requesting a Magnesium order be placed so he can get it with other labs this week. States it's time for it to get rechecked. Please advise

## 2024-02-27 ENCOUNTER — HOSPITAL ENCOUNTER (OUTPATIENT)
Age: 64
Discharge: HOME | End: 2024-02-27
Payer: MEDICARE

## 2024-02-27 ENCOUNTER — HOSPITAL ENCOUNTER (OUTPATIENT)
Dept: CARDIOLOGY | Age: 64
Discharge: HOME | End: 2024-02-27
Payer: MEDICARE

## 2024-02-27 ENCOUNTER — TELEPHONE (OUTPATIENT)
Dept: CARDIOLOGY CLINIC | Age: 64
End: 2024-02-27

## 2024-02-27 DIAGNOSIS — E83.42 HYPOMAGNESEMIA: ICD-10-CM

## 2024-02-27 DIAGNOSIS — I25.10 CORONARY ARTERY DISEASE INVOLVING NATIVE CORONARY ARTERY OF NATIVE HEART WITHOUT ANGINA PECTORIS: ICD-10-CM

## 2024-02-27 DIAGNOSIS — I10 PRIMARY HYPERTENSION: ICD-10-CM

## 2024-02-27 LAB
ALBUMIN SERPL-MCNC: 4.7 G/DL (ref 3.4–5)
ALBUMIN/GLOB SERPL: 1.6 {RATIO} (ref 1.1–2.2)
ALP SERPL-CCNC: 59 U/L (ref 40–129)
ALT SERPL-CCNC: 16 U/L (ref 10–40)
ANION GAP SERPL CALCULATED.3IONS-SCNC: 12 MMOL/L (ref 3–16)
AST SERPL-CCNC: 16 U/L (ref 15–37)
BILIRUB SERPL-MCNC: 0.4 MG/DL (ref 0–1)
BUN SERPL-MCNC: 31 MG/DL (ref 7–20)
CALCIUM SERPL-MCNC: 10.2 MG/DL (ref 8.3–10.6)
CHLORIDE SERPL-SCNC: 103 MMOL/L (ref 99–110)
CO2 SERPL-SCNC: 26 MMOL/L (ref 21–32)
CREAT SERPL-MCNC: 1.2 MG/DL (ref 0.8–1.3)
CRP SERPL-MCNC: <3 MG/L (ref 0–5.1)
DEPRECATED RDW RBC AUTO: 14 % (ref 12.4–15.4)
GFR SERPLBLD CREATININE-BSD FMLA CKD-EPI: >60 ML/MIN/{1.73_M2}
GLUCOSE SERPL-MCNC: 124 MG/DL (ref 70–99)
HAV IGM SERPL QL IA: NORMAL
HBV CORE IGM SERPL QL IA: NORMAL
HBV SURFACE AG SERPL QL IA: NORMAL
HCT VFR BLD AUTO: 38.4 % (ref 40.5–52.5)
HCV AB SERPL QL IA: NORMAL
HGB BLD-MCNC: 13.2 G/DL (ref 13.5–17.5)
MCH RBC QN AUTO: 30.3 PG (ref 26–34)
MCHC RBC AUTO-ENTMCNC: 34.4 G/DL (ref 31–36)
MCV RBC AUTO: 88.1 FL (ref 80–100)
PLATELET # BLD AUTO: 201 K/UL (ref 135–450)
PMV BLD AUTO: 9.5 FL (ref 5–10.5)
POTASSIUM SERPL-SCNC: 5.3 MMOL/L (ref 3.5–5.1)
PROT SERPL-MCNC: 7.6 G/DL (ref 6.4–8.2)
RBC # BLD AUTO: 4.35 M/UL (ref 4.2–5.9)
SODIUM SERPL-SCNC: 141 MMOL/L (ref 136–145)
WBC # BLD AUTO: 9.2 K/UL (ref 4–11)

## 2024-02-27 PROCEDURE — 85027 COMPLETE CBC AUTOMATED: CPT

## 2024-02-27 PROCEDURE — 93017 CV STRESS TEST TRACING ONLY: CPT

## 2024-02-27 PROCEDURE — 86480 TB TEST CELL IMMUN MEASURE: CPT

## 2024-02-27 PROCEDURE — 80053 COMPREHEN METABOLIC PANEL: CPT

## 2024-02-27 PROCEDURE — 6360000002 HC RX W HCPCS: Performed by: INTERNAL MEDICINE

## 2024-02-27 PROCEDURE — A9502 TC99M TETROFOSMIN: HCPCS | Performed by: INTERNAL MEDICINE

## 2024-02-27 PROCEDURE — 80074 ACUTE HEPATITIS PANEL: CPT

## 2024-02-27 PROCEDURE — 78452 HT MUSCLE IMAGE SPECT MULT: CPT

## 2024-02-27 PROCEDURE — 86140 C-REACTIVE PROTEIN: CPT

## 2024-02-27 PROCEDURE — 3430000000 HC RX DIAGNOSTIC RADIOPHARMACEUTICAL: Performed by: INTERNAL MEDICINE

## 2024-02-27 RX ORDER — REGADENOSON 0.08 MG/ML
0.4 INJECTION, SOLUTION INTRAVENOUS
Status: COMPLETED | OUTPATIENT
Start: 2024-02-27 | End: 2024-02-27

## 2024-02-27 RX ADMIN — REGADENOSON 0.4 MG: 0.08 INJECTION, SOLUTION INTRAVENOUS at 09:22

## 2024-02-27 RX ADMIN — TETROFOSMIN 30.4 MILLICURIE: 1.38 INJECTION, POWDER, LYOPHILIZED, FOR SOLUTION INTRAVENOUS at 09:22

## 2024-02-27 RX ADMIN — TETROFOSMIN 11.5 MILLICURIE: 1.38 INJECTION, POWDER, LYOPHILIZED, FOR SOLUTION INTRAVENOUS at 08:07

## 2024-02-27 NOTE — TELEPHONE ENCOUNTER
Patient already had his labs drawn today, but I called the lab and they are going to add the Magnesium on to the labs that were taken today, so patient will not have to be stuck again.     Patient has been informed of this as well.

## 2024-02-27 NOTE — TELEPHONE ENCOUNTER
----- Message from Saul Skinner MD sent at 2/27/2024  3:22 PM EST -----  The test is normal. Please call the patient and inform them of the normal result.

## 2024-02-28 LAB — MAGNESIUM SERPL-MCNC: 1.2 MG/DL (ref 1.8–2.4)

## 2024-03-01 DIAGNOSIS — E83.42 HYPOMAGNESEMIA: Primary | ICD-10-CM

## 2024-03-01 DIAGNOSIS — E87.5 SERUM POTASSIUM ELEVATED: ICD-10-CM

## 2024-03-01 LAB
GAMMA INTERFERON BACKGROUND BLD IA-ACNC: 0.04 IU/ML
MITOGEN IGNF BCKGRD COR BLD-ACNC: >10 IU/ML
QUANTI TB GOLD PLUS: NEGATIVE
QUANTI TB1 MINUS NIL: 0.01 IU/ML (ref 0–0.34)
QUANTI TB2 MINUS NIL: 0.01 IU/ML (ref 0–0.34)

## 2024-03-19 DIAGNOSIS — F51.02 ADJUSTMENT INSOMNIA: ICD-10-CM

## 2024-03-19 RX ORDER — DIAZEPAM 5 MG/1
TABLET ORAL
Qty: 50 TABLET | Refills: 0 | Status: SHIPPED | OUTPATIENT
Start: 2024-03-19 | End: 2024-04-18

## 2024-03-19 NOTE — TELEPHONE ENCOUNTER
Refill Request - Controlled Substance    CONFIRM preferred pharmacy with the patient.    If Mail Order Rx - Pend for 90 day refill.        Last Seen Department: 1/30/2024  Last Seen by PCP: 1/30/2024    Last Written: 2/19/2024    Last UDS: 12/19/2017    Med Agreement Signed On: 11/9/2022    If no future appointment scheduled:  Review the last OV with PCP and review information for follow-up visit,  Route STAFF MESSAGE with patient name to the  Pool for scheduling with the following information:            -  Timing of next visit           -  Visit type ie Physical, OV, etc           -  Diagnoses/Reason ie. COPD, HTN - Do not use MEDICATION, Follow-up or CHECK UP - Give reason for visit        Next Appointment:   Future Appointments   Date Time Provider Department Center   4/24/2024  9:30 AM Miguel Snow, DO [facility] [place] [facility]       Message sent to  to schedule appt with patient?  NO      Requested Prescriptions     Pending Prescriptions Disp Refills    diazePAM (VALIUM) 5 MG tablet [Pharmacy Med Name: DIAZEPAM 5 MG TABLET] 50 tablet 0     Sig: TAKE 1 TABLET BY MOUTH TWICE A DAY AS NEEDED FOR ANXIETY *TO LAST FULL 30 DAYS

## 2024-04-18 DIAGNOSIS — F51.02 ADJUSTMENT INSOMNIA: ICD-10-CM

## 2024-04-18 NOTE — TELEPHONE ENCOUNTER
Refill Request - Controlled Substance    CONFIRM preferred pharmacy with the patient.    If Mail Order Rx - Pend for 90 day refill.        Last Seen Department: 1/30/2024  Last Seen by PCP: 10/18/2023    Last Written: 3/19/24 50 tabs 0 refills     Last UDS: 12/19/2017    Med Agreement Signed On: 11/15/22    If no future appointment scheduled:  Review the last OV with PCP and review information for follow-up visit,  Route STAFF MESSAGE with patient name to the  Pool for scheduling with the following information:            -  Timing of next visit           -  Visit type ie Physical, OV, etc           -  Diagnoses/Reason ie. COPD, HTN - Do not use MEDICATION, Follow-up or CHECK UP - Give reason for visit        Next Appointment:   Future Appointments   Date Time Provider Department Center   4/24/2024  9:30 AM Miguel Snow, DO [facility] [place] [facility]       Message sent to  to schedule appt with patient?  NO      Requested Prescriptions     Pending Prescriptions Disp Refills    diazePAM (VALIUM) 5 MG tablet 50 tablet 0     Sig: TAKE 1 TABLET BY MOUTH TWICE A DAY AS NEEDED FOR ANXIETY *TO LAST FULL 30 DAYS

## 2024-04-19 RX ORDER — DIAZEPAM 5 MG/1
TABLET ORAL
Qty: 50 TABLET | Refills: 0 | Status: SHIPPED | OUTPATIENT
Start: 2024-04-19 | End: 2024-05-18

## 2024-04-23 ENCOUNTER — TELEPHONE (OUTPATIENT)
Dept: FAMILY MEDICINE CLINIC | Age: 64
End: 2024-04-23

## 2024-04-23 NOTE — TELEPHONE ENCOUNTER
----- Message from Julee Charlton sent at 4/23/2024 10:35 AM EDT -----  Regarding: [facility] Appointment Request  [facility] Appointment Request    Patient needs appointment for [facility] Appointment Type: Existing Condition Follow Up.    Reason for Appointment Request: Available appointments did not meet patient need ,patient wanted to have the callback tomorrow morning and wanted to have the reschedule for next week morning session. As he can't make it the appointment tomorrow 4/24/2024 due to patient is sick.  --------------------------------------------------------------------------------------------------------------------------    Relationship to Patient: Self     Call Back Information: OK to leave message on voicemail  Preferred Call Back Number: best callback number: [phone]

## 2024-04-24 NOTE — TELEPHONE ENCOUNTER
Spoke with patient and got him r/s with Dr. Waddell for 5/2 at 11:30. Was unable to do the 5/1 appt time with Dr. Snow.

## 2024-05-02 ENCOUNTER — OFFICE VISIT (OUTPATIENT)
Dept: FAMILY MEDICINE CLINIC | Age: 64
End: 2024-05-02
Payer: MEDICARE

## 2024-05-02 VITALS
OXYGEN SATURATION: 97 % | WEIGHT: 205 LBS | DIASTOLIC BLOOD PRESSURE: 74 MMHG | SYSTOLIC BLOOD PRESSURE: 118 MMHG | BODY MASS INDEX: 27.8 KG/M2 | HEART RATE: 74 BPM

## 2024-05-02 DIAGNOSIS — I10 ESSENTIAL HYPERTENSION: ICD-10-CM

## 2024-05-02 DIAGNOSIS — E78.2 MIXED HYPERLIPIDEMIA: ICD-10-CM

## 2024-05-02 DIAGNOSIS — K50.919 CROHN'S DISEASE WITH COMPLICATION, UNSPECIFIED GASTROINTESTINAL TRACT LOCATION (HCC): ICD-10-CM

## 2024-05-02 DIAGNOSIS — E11.9 TYPE 2 DIABETES MELLITUS WITHOUT COMPLICATION, WITH LONG-TERM CURRENT USE OF INSULIN (HCC): Primary | ICD-10-CM

## 2024-05-02 DIAGNOSIS — Z79.4 TYPE 2 DIABETES MELLITUS WITHOUT COMPLICATION, WITH LONG-TERM CURRENT USE OF INSULIN (HCC): Primary | ICD-10-CM

## 2024-05-02 LAB — HBA1C MFR BLD: 6.4 %

## 2024-05-02 PROCEDURE — 83036 HEMOGLOBIN GLYCOSYLATED A1C: CPT | Performed by: FAMILY MEDICINE

## 2024-05-02 PROCEDURE — 3078F DIAST BP <80 MM HG: CPT | Performed by: FAMILY MEDICINE

## 2024-05-02 PROCEDURE — 3074F SYST BP LT 130 MM HG: CPT | Performed by: FAMILY MEDICINE

## 2024-05-02 PROCEDURE — 99214 OFFICE O/P EST MOD 30 MIN: CPT | Performed by: FAMILY MEDICINE

## 2024-05-02 PROCEDURE — 3044F HG A1C LEVEL LT 7.0%: CPT | Performed by: FAMILY MEDICINE

## 2024-05-02 PROCEDURE — 1123F ACP DISCUSS/DSCN MKR DOCD: CPT | Performed by: FAMILY MEDICINE

## 2024-05-02 NOTE — PROGRESS NOTES
Zafar Chong is a 63 y.o. male    Chief Complaint   Patient presents with    Diabetes    6 Month Follow-Up    Joint Pain     Fusion Monday\" Feels like Kayli been in a car wreck, sore all over \"        HPI:    Diabetes  He presents for his follow-up diabetic visit. He has type 2 diabetes mellitus. His disease course has been stable. Risk factors for coronary artery disease include male sex, diabetes mellitus, hypertension and obesity. Current diabetic treatment includes intensive insulin program. An ACE inhibitor/angiotensin II receptor blocker is being taken.   Hypertension  This is a chronic problem. The current episode started more than 1 year ago. The problem is unchanged. The problem is controlled. Risk factors for coronary artery disease include obesity, diabetes mellitus and male gender. Past treatments include angiotensin blockers and diuretics. The current treatment provides significant improvement. There is no history of kidney disease.   Hyperlipidemia  This is a chronic problem. The current episode started more than 1 year ago. The problem is controlled. Recent lipid tests were reviewed and are normal. Exacerbating diseases include diabetes. Pertinent negatives include no myalgias. Current antihyperlipidemic treatment includes statins. The current treatment provides significant improvement of lipids. There are no compliance problems.  Risk factors for coronary artery disease include male sex, diabetes mellitus and obesity.   Joint Pain   His past medical history is significant for diabetes.       ROS:    Review of Systems   Musculoskeletal:  Positive for arthralgias. Negative for myalgias.       /74   Pulse 74   Wt 93 kg (205 lb)   SpO2 97%   BMI 27.80 kg/m²     Physical Exam:    Physical Exam  Constitutional:       General: He is not in acute distress.     Appearance: Normal appearance. He is normal weight. He is not ill-appearing or toxic-appearing.   HENT:      Head: Normocephalic.

## 2024-05-08 ENCOUNTER — TELEPHONE (OUTPATIENT)
Dept: FAMILY MEDICINE CLINIC | Age: 64
End: 2024-05-08

## 2024-05-08 DIAGNOSIS — E83.42 HYPOMAGNESEMIA: Primary | ICD-10-CM

## 2024-05-08 NOTE — TELEPHONE ENCOUNTER
Patient requesting to have the order for the magnesium repeat blood draw placed. He is going tomorrow to have some other labs drawn and wanted to see if you could add the magnesium so he doesn't have to make 2 trips. Please advise.

## 2024-05-09 ENCOUNTER — HOSPITAL ENCOUNTER (OUTPATIENT)
Age: 64
Discharge: HOME | End: 2024-05-09
Payer: MEDICARE

## 2024-05-09 DIAGNOSIS — E83.42 HYPOMAGNESEMIA: ICD-10-CM

## 2024-05-09 LAB
ALBUMIN SERPL-MCNC: 4.4 G/DL (ref 3.4–5)
ALBUMIN/GLOB SERPL: 1.4 {RATIO} (ref 1.1–2.2)
ALP SERPL-CCNC: 69 U/L (ref 40–129)
ALT SERPL-CCNC: 17 U/L (ref 10–40)
ANION GAP SERPL CALCULATED.3IONS-SCNC: 14 MMOL/L (ref 3–16)
AST SERPL-CCNC: 18 U/L (ref 15–37)
BILIRUB SERPL-MCNC: 0.5 MG/DL (ref 0–1)
BUN SERPL-MCNC: 32 MG/DL (ref 7–20)
CALCIUM SERPL-MCNC: 9.8 MG/DL (ref 8.3–10.6)
CHLORIDE SERPL-SCNC: 102 MMOL/L (ref 99–110)
CO2 SERPL-SCNC: 25 MMOL/L (ref 21–32)
CREAT SERPL-MCNC: 1.8 MG/DL (ref 0.8–1.3)
CRP SERPL-MCNC: <3 MG/L (ref 0–5.1)
DEPRECATED RDW RBC AUTO: 12.6 % (ref 12.4–15.4)
GFR SERPLBLD CREATININE-BSD FMLA CKD-EPI: 41 ML/MIN/{1.73_M2}
GLUCOSE SERPL-MCNC: 130 MG/DL (ref 70–99)
HCT VFR BLD AUTO: 39.6 % (ref 40.5–52.5)
HGB BLD-MCNC: 13.6 G/DL (ref 13.5–17.5)
LIPASE SERPL-CCNC: 7 U/L (ref 13–60)
MAGNESIUM SERPL-MCNC: 1.2 MG/DL (ref 1.8–2.4)
MCH RBC QN AUTO: 29.7 PG (ref 26–34)
MCHC RBC AUTO-ENTMCNC: 34.5 G/DL (ref 31–36)
MCV RBC AUTO: 86.3 FL (ref 80–100)
PLATELET # BLD AUTO: 210 K/UL (ref 135–450)
PMV BLD AUTO: 9.5 FL (ref 5–10.5)
POTASSIUM SERPL-SCNC: 3.9 MMOL/L (ref 3.5–5.1)
PROT SERPL-MCNC: 7.5 G/DL (ref 6.4–8.2)
RBC # BLD AUTO: 4.59 M/UL (ref 4.2–5.9)
SODIUM SERPL-SCNC: 141 MMOL/L (ref 136–145)
WBC # BLD AUTO: 7.8 K/UL (ref 4–11)

## 2024-05-09 PROCEDURE — 83690 ASSAY OF LIPASE: CPT

## 2024-05-09 PROCEDURE — 80053 COMPREHEN METABOLIC PANEL: CPT

## 2024-05-09 PROCEDURE — 83735 ASSAY OF MAGNESIUM: CPT

## 2024-05-09 PROCEDURE — 36415 COLL VENOUS BLD VENIPUNCTURE: CPT

## 2024-05-09 PROCEDURE — 85027 COMPLETE CBC AUTOMATED: CPT

## 2024-05-09 PROCEDURE — 86140 C-REACTIVE PROTEIN: CPT

## 2024-05-10 DIAGNOSIS — E83.42 HYPOMAGNESEMIA: Primary | ICD-10-CM

## 2024-05-22 DIAGNOSIS — E11.9 TYPE 2 DIABETES MELLITUS WITHOUT COMPLICATION, WITH LONG-TERM CURRENT USE OF INSULIN (HCC): ICD-10-CM

## 2024-05-22 DIAGNOSIS — Z79.4 TYPE 2 DIABETES MELLITUS WITHOUT COMPLICATION, WITH LONG-TERM CURRENT USE OF INSULIN (HCC): ICD-10-CM

## 2024-05-22 RX ORDER — INSULIN GLARGINE 100 [IU]/ML
30 INJECTION, SOLUTION SUBCUTANEOUS 2 TIMES DAILY
Qty: 54 ADJUSTABLE DOSE PRE-FILLED PEN SYRINGE | Refills: 1 | Status: SHIPPED | OUTPATIENT
Start: 2024-05-22

## 2024-05-22 RX ORDER — FLUTICASONE PROPIONATE 50 MCG
2 SPRAY, SUSPENSION (ML) NASAL DAILY
Qty: 16 G | Refills: 1 | Status: SHIPPED | OUTPATIENT
Start: 2024-05-22

## 2024-05-22 NOTE — TELEPHONE ENCOUNTER
Refill Request     CONFIRM preferred pharmacy with the patient.    If Mail Order Rx - Pend for 90 day refill.      Last Seen: Last Seen Department: 5/2/2024  Last Seen by PCP: 10/18/2023    Last Written: 10/29/2023, #6, 5 refills     If no future appointment scheduled:  Review the last OV with PCP and review information for follow-up visit,  Route STAFF MESSAGE with patient name to the  Pool for scheduling with the following information:            -  Timing of next visit           -  Visit type ie Physical, OV, etc           -  Diagnoses/Reason ie. COPD, HTN - Do not use MEDICATION, Follow-up or CHECK UP - Give reason for visit      Next Appointment:   Future Appointments   Date Time Provider Department Center   11/11/2024  9:00 AM Miguel Snow, DO [facility] [place]       Message sent to  to schedule appt with patient?  YES      Requested Prescriptions     Pending Prescriptions Disp Refills    LANTUS SOLOSTAR 100 UNIT/ML injection pen [Pharmacy Med Name: LANTUS SOLOSTAR 100 UNIT/ML]  1     Sig: INJECT 30 UNITS INTO THE SKIN 2 TIMES DAILY

## 2024-05-22 NOTE — TELEPHONE ENCOUNTER
Refill Request     CONFIRM preferred pharmacy with the patient.    If Mail Order Rx - Pend for 90 day refill.      Last Seen: Last Seen Department: 5/2/2024  Last Seen by PCP: 10/18/2023    Last Written: 11/21/23 16 g with 5 refills     If no future appointment scheduled:  Review the last OV with PCP and review information for follow-up visit,  Route STAFF MESSAGE with patient name to the  Pool for scheduling with the following information:            -  Timing of next visit           -  Visit type ie Physical, OV, etc           -  Diagnoses/Reason ie. COPD, HTN - Do not use MEDICATION, Follow-up or CHECK UP - Give reason for visit      Next Appointment:   Future Appointments   Date Time Provider Department Center   11/11/2024  9:00 AM Miguel Snow, DO [facility] [place]       Message sent to  to schedule appt with patient?  NO      Requested Prescriptions     Pending Prescriptions Disp Refills    fluticasone (FLONASE) 50 MCG/ACT nasal spray [Pharmacy Med Name: FLUTICASONE PROP 50 MCG SPRAY]  1     Sig: SPRAY 2 SPRAYS INTO EACH NOSTRIL EVERY DAY

## 2024-05-25 DIAGNOSIS — F51.02 ADJUSTMENT INSOMNIA: ICD-10-CM

## 2024-05-25 NOTE — TELEPHONE ENCOUNTER
Refill Request - Controlled Substance    CONFIRM preferred pharmacy with the patient.    If Mail Order Rx - Pend for 90 day refill.        Last Seen Department: 5/2/2024  Last Seen by PCP: 10/18/2023    Last Written: 4/19/2024    Last UDS: 12/19/2017    Med Agreement Signed On: 11/9/2022    If no future appointment scheduled:  Review the last OV with PCP and review information for follow-up visit,  Route STAFF MESSAGE with patient name to the  Pool for scheduling with the following information:            -  Timing of next visit           -  Visit type ie Physical, OV, etc           -  Diagnoses/Reason ie. COPD, HTN - Do not use MEDICATION, Follow-up or CHECK UP - Give reason for visit        Next Appointment:   Future Appointments   Date Time Provider Department Center   11/11/2024  9:00 AM Miguel Snow DO [facility] [place] [facility]       Message sent to  to schedule appt with patient?  NO      Requested Prescriptions     Pending Prescriptions Disp Refills    diazePAM (VALIUM) 5 MG tablet [Pharmacy Med Name: DIAZEPAM 5 MG TABLET] 50 tablet 0     Sig: TAKE 1 TABLET BY MOUTH TWICE A DAY AS NEEDED FOR ANXIETY *TO LAST FULL 30 DAYS

## 2024-05-27 RX ORDER — DIAZEPAM 5 MG/1
TABLET ORAL
Qty: 50 TABLET | Refills: 0 | Status: SHIPPED | OUTPATIENT
Start: 2024-05-27 | End: 2024-06-24

## 2024-06-13 RX ORDER — FLUTICASONE PROPIONATE 50 MCG
2 SPRAY, SUSPENSION (ML) NASAL DAILY
Qty: 3 EACH | Refills: 1 | Status: SHIPPED | OUTPATIENT
Start: 2024-06-13

## 2024-06-13 NOTE — TELEPHONE ENCOUNTER
Refill Request     CONFIRM preferred pharmacy with the patient.    If Mail Order Rx - Pend for 90 day refill.      Last Seen: Last Seen Department: 5/2/2024  Last Seen by PCP: 1/30/2024    Last Written: 5/22/24 16 g with 1 refill     If no future appointment scheduled:  Review the last OV with PCP and review information for follow-up visit,  Route STAFF MESSAGE with patient name to the  Pool for scheduling with the following information:            -  Timing of next visit           -  Visit type ie Physical, OV, etc           -  Diagnoses/Reason ie. COPD, HTN - Do not use MEDICATION, Follow-up or CHECK UP - Give reason for visit      Next Appointment:   Future Appointments   Date Time Provider Department Center   11/11/2024  9:00 AM Miguel Snow, DO [facility] [place]       Message sent to  to schedule appt with patient?  NO      Requested Prescriptions     Pending Prescriptions Disp Refills    fluticasone (FLONASE) 50 MCG/ACT nasal spray [Pharmacy Med Name: FLUTICASONE PROP 50 MCG SPRAY]  1     Sig: SPRAY 2 SPRAYS INTO EACH NOSTRIL EVERY DAY

## 2024-06-28 DIAGNOSIS — F51.02 ADJUSTMENT INSOMNIA: ICD-10-CM

## 2024-06-28 RX ORDER — DIAZEPAM 5 MG/1
TABLET ORAL
Qty: 50 TABLET | Refills: 1 | Status: SHIPPED | OUTPATIENT
Start: 2024-06-28 | End: 2024-07-26

## 2024-06-28 NOTE — TELEPHONE ENCOUNTER
Refill Request - Controlled Substance    CONFIRM preferred pharmacy with the patient.    If Mail Order Rx - Pend for 90 day refill.        Last Seen Department: 5/2/2024  Last Seen by PCP: 1/30/2024    Last Written: discontinued 1/25/21 by Dr. Snow     Last UDS: 12/19/17    Med Agreement Signed On: 11/15/22    If no future appointment scheduled:  Review the last OV with PCP and review information for follow-up visit,  Route STAFF MESSAGE with patient name to the  Pool for scheduling with the following information:            -  Timing of next visit           -  Visit type ie Physical, OV, etc           -  Diagnoses/Reason ie. COPD, HTN - Do not use MEDICATION, Follow-up or CHECK UP - Give reason for visit        Next Appointment:   Future Appointments   Date Time Provider Department Center   11/11/2024  9:00 AM Miguel Snow DO [facility]  [place] [facility]       Message sent to  to schedule appt with patient?  NO      Requested Prescriptions     Pending Prescriptions Disp Refills    diazePAM (VALIUM) 5 MG tablet [Pharmacy Med Name: DIAZEPAM 5 MG TABLET] 50 tablet 0     Sig: TAKE 1 TABLET BY MOUTH TWICE A DAY AS NEEDED FOR ANXIETY *TO LAST FULL 30 DAYS

## 2024-08-20 RX ORDER — BLOOD SUGAR DIAGNOSTIC
STRIP MISCELLANEOUS
Qty: 100 STRIP | Refills: 5 | Status: SHIPPED | OUTPATIENT
Start: 2024-08-20

## 2024-08-20 NOTE — TELEPHONE ENCOUNTER
Refill Request     CONFIRM preferred pharmacy with the patient.    If Mail Order Rx - Pend for 90 day refill.      Last Seen: Last Seen Department: 5/2/2024  Last Seen by PCP: 10/18/2023    Last Written: 8/17/23 #100 each - 5 refills     If no future appointment scheduled:  Review the last OV with PCP and review information for follow-up visit,  Route STAFF MESSAGE with patient name to the  Pool for scheduling with the following information:            -  Timing of next visit           -  Visit type ie Physical, OV, etc           -  Diagnoses/Reason ie. COPD, HTN - Do not use MEDICATION, Follow-up or CHECK UP - Give reason for visit      Next Appointment:   Future Appointments   Date Time Provider Department Center   11/11/2024  9:00 AM Miguel Snow, DO [facility] DEP       Message sent to  to schedule appt with patient?  NO      Requested Prescriptions     Pending Prescriptions Disp Refills    ACCU-CHEK GUIDE strip [Pharmacy Med Name: ACCU-CHEK GUIDE TEST STRIP] 100 strip 5     Sig: TEST ONCE DAILY AS NEEDED

## 2024-08-27 DIAGNOSIS — F51.02 ADJUSTMENT INSOMNIA: ICD-10-CM

## 2024-08-27 RX ORDER — DIAZEPAM 5 MG
TABLET ORAL
Qty: 50 TABLET | Refills: 1 | Status: SHIPPED | OUTPATIENT
Start: 2024-08-27 | End: 2024-09-26

## 2024-08-27 NOTE — TELEPHONE ENCOUNTER
Refill Request - Controlled Substance    CONFIRM preferred pharmacy with the patient.    If Mail Order Rx - Pend for 90 day refill.        Last Seen Department: 5/2/2024  Last Seen by PCP: 10/18/2023    Last Written: 06/28/2024 50 tab 1 refills     Last UDS: 12/19/2017    Med Agreement Signed On: 11/15/2022    If no future appointment scheduled:  Review the last OV with PCP and review information for follow-up visit,  Route STAFF MESSAGE with patient name to the  Pool for scheduling with the following information:            -  Timing of next visit           -  Visit type ie Physical, OV, etc           -  Diagnoses/Reason ie. COPD, HTN - Do not use MEDICATION, Follow-up or CHECK UP - Give reason for visit        Next Appointment:   Future Appointments   Date Time Provider Department Center   11/11/2024  9:00 AM Miguel Snow DO [facility] DEP       Message sent to  to schedule appt with patient?  NO      Requested Prescriptions     Pending Prescriptions Disp Refills    diazePAM (VALIUM) 5 MG tablet [Pharmacy Med Name: DIAZEPAM 5 MG TABLET] 50 tablet 1     Sig: TAKE 1 TABLET BY MOUTH TWICE A DAY AS NEEDED FOR ANXIETY *TO LAST FULL 30 DAYS

## 2024-09-18 ENCOUNTER — TELEPHONE (OUTPATIENT)
Dept: FAMILY MEDICINE CLINIC | Age: 64
End: 2024-09-18

## 2024-09-18 NOTE — TELEPHONE ENCOUNTER
Patient is asking to keep Dr. Waddell as his PCP he wants a male and wants to stay with Dr. Waddell.     Is this ok to change over to you as a PCP?

## 2024-09-19 ENCOUNTER — OFFICE VISIT (OUTPATIENT)
Dept: FAMILY MEDICINE CLINIC | Age: 64
End: 2024-09-19

## 2024-09-19 ENCOUNTER — OFFICE VISIT (OUTPATIENT)
Dept: FAMILY MEDICINE CLINIC | Age: 64
End: 2024-09-19
Payer: MEDICARE

## 2024-09-19 VITALS
OXYGEN SATURATION: 98 % | HEIGHT: 72 IN | HEART RATE: 95 BPM | DIASTOLIC BLOOD PRESSURE: 76 MMHG | WEIGHT: 205 LBS | BODY MASS INDEX: 27.77 KG/M2 | RESPIRATION RATE: 18 BRPM | SYSTOLIC BLOOD PRESSURE: 132 MMHG

## 2024-09-19 VITALS
HEART RATE: 95 BPM | RESPIRATION RATE: 18 BRPM | DIASTOLIC BLOOD PRESSURE: 76 MMHG | SYSTOLIC BLOOD PRESSURE: 132 MMHG | OXYGEN SATURATION: 96 % | WEIGHT: 205.4 LBS | BODY MASS INDEX: 27.82 KG/M2 | HEIGHT: 72 IN

## 2024-09-19 DIAGNOSIS — E78.2 MIXED HYPERLIPIDEMIA: ICD-10-CM

## 2024-09-19 DIAGNOSIS — E83.42 HYPOMAGNESEMIA: ICD-10-CM

## 2024-09-19 DIAGNOSIS — Z00.00 INITIAL MEDICARE ANNUAL WELLNESS VISIT: Primary | ICD-10-CM

## 2024-09-19 DIAGNOSIS — E55.9 VITAMIN D DEFICIENCY: ICD-10-CM

## 2024-09-19 DIAGNOSIS — Z79.4 TYPE 2 DIABETES MELLITUS WITHOUT COMPLICATION, WITH LONG-TERM CURRENT USE OF INSULIN (HCC): ICD-10-CM

## 2024-09-19 DIAGNOSIS — Z12.5 SCREENING PSA (PROSTATE SPECIFIC ANTIGEN): ICD-10-CM

## 2024-09-19 DIAGNOSIS — K21.9 GASTROESOPHAGEAL REFLUX DISEASE WITHOUT ESOPHAGITIS: ICD-10-CM

## 2024-09-19 DIAGNOSIS — E11.9 TYPE 2 DIABETES MELLITUS WITHOUT COMPLICATION, WITH LONG-TERM CURRENT USE OF INSULIN (HCC): ICD-10-CM

## 2024-09-19 DIAGNOSIS — M79.605 LEFT LEG PAIN: Primary | ICD-10-CM

## 2024-09-19 LAB
25(OH)D3 SERPL-MCNC: 31.6 NG/ML
ALBUMIN SERPL-MCNC: 4.6 G/DL (ref 3.4–5)
ALBUMIN/GLOB SERPL: 1.8 {RATIO} (ref 1.1–2.2)
ALP SERPL-CCNC: 52 U/L (ref 40–129)
ALT SERPL-CCNC: 17 U/L (ref 10–40)
ANION GAP SERPL CALCULATED.3IONS-SCNC: 16 MMOL/L (ref 3–16)
AST SERPL-CCNC: 16 U/L (ref 15–37)
BASOPHILS # BLD: 0.1 K/UL (ref 0–0.2)
BASOPHILS NFR BLD: 0.4 %
BILIRUB SERPL-MCNC: 0.5 MG/DL (ref 0–1)
BUN SERPL-MCNC: 26 MG/DL (ref 7–20)
CALCIUM SERPL-MCNC: 10.1 MG/DL (ref 8.3–10.6)
CHLORIDE SERPL-SCNC: 103 MMOL/L (ref 99–110)
CHOLEST SERPL-MCNC: 130 MG/DL (ref 0–199)
CO2 SERPL-SCNC: 23 MMOL/L (ref 21–32)
CREAT SERPL-MCNC: 1.3 MG/DL (ref 0.8–1.3)
DEPRECATED RDW RBC AUTO: 14 % (ref 12.4–15.4)
EOSINOPHIL # BLD: 0 K/UL (ref 0–0.6)
EOSINOPHIL NFR BLD: 0.3 %
EST. AVERAGE GLUCOSE BLD GHB EST-MCNC: 168.6 MG/DL
FOLATE SERPL-MCNC: 11.3 NG/ML (ref 4.78–24.2)
GFR SERPLBLD CREATININE-BSD FMLA CKD-EPI: 61 ML/MIN/{1.73_M2}
GLUCOSE SERPL-MCNC: 85 MG/DL (ref 70–99)
HBA1C MFR BLD: 7.5 %
HCT VFR BLD AUTO: 41.7 % (ref 40.5–52.5)
HDLC SERPL-MCNC: 68 MG/DL (ref 40–60)
HGB BLD-MCNC: 14 G/DL (ref 13.5–17.5)
LDLC SERPL CALC-MCNC: 44 MG/DL
LYMPHOCYTES # BLD: 2.4 K/UL (ref 1–5.1)
LYMPHOCYTES NFR BLD: 19.7 %
MAGNESIUM SERPL-MCNC: 1.11 MG/DL (ref 1.8–2.4)
MCH RBC QN AUTO: 29.5 PG (ref 26–34)
MCHC RBC AUTO-ENTMCNC: 33.6 G/DL (ref 31–36)
MCV RBC AUTO: 87.7 FL (ref 80–100)
MONOCYTES # BLD: 0.8 K/UL (ref 0–1.3)
MONOCYTES NFR BLD: 6.7 %
NEUTROPHILS # BLD: 8.8 K/UL (ref 1.7–7.7)
NEUTROPHILS NFR BLD: 72.9 %
PLATELET # BLD AUTO: 239 K/UL (ref 135–450)
PMV BLD AUTO: 10.1 FL (ref 5–10.5)
POTASSIUM SERPL-SCNC: 3.7 MMOL/L (ref 3.5–5.1)
PROT SERPL-MCNC: 7.2 G/DL (ref 6.4–8.2)
PSA SERPL DL<=0.01 NG/ML-MCNC: 0.92 NG/ML (ref 0–4)
RBC # BLD AUTO: 4.75 M/UL (ref 4.2–5.9)
SODIUM SERPL-SCNC: 142 MMOL/L (ref 136–145)
TRIGL SERPL-MCNC: 92 MG/DL (ref 0–150)
TSH SERPL DL<=0.005 MIU/L-ACNC: 1.02 UIU/ML (ref 0.27–4.2)
VIT B12 SERPL-MCNC: <150 PG/ML (ref 211–911)
VLDLC SERPL CALC-MCNC: 18 MG/DL
WBC # BLD AUTO: 12.1 K/UL (ref 4–11)

## 2024-09-19 PROCEDURE — 3075F SYST BP GE 130 - 139MM HG: CPT | Performed by: FAMILY MEDICINE

## 2024-09-19 PROCEDURE — 1123F ACP DISCUSS/DSCN MKR DOCD: CPT | Performed by: FAMILY MEDICINE

## 2024-09-19 PROCEDURE — G0438 PPPS, INITIAL VISIT: HCPCS | Performed by: FAMILY MEDICINE

## 2024-09-19 PROCEDURE — 3078F DIAST BP <80 MM HG: CPT | Performed by: FAMILY MEDICINE

## 2024-09-19 RX ORDER — FAMOTIDINE 20 MG/1
20 TABLET, FILM COATED ORAL 2 TIMES DAILY PRN
Qty: 60 TABLET | Refills: 5 | Status: SHIPPED | OUTPATIENT
Start: 2024-09-19

## 2024-09-19 ASSESSMENT — PATIENT HEALTH QUESTIONNAIRE - PHQ9
1. LITTLE INTEREST OR PLEASURE IN DOING THINGS: NOT AT ALL
SUM OF ALL RESPONSES TO PHQ QUESTIONS 1-9: 0
SUM OF ALL RESPONSES TO PHQ QUESTIONS 1-9: 0
2. FEELING DOWN, DEPRESSED OR HOPELESS: NOT AT ALL
SUM OF ALL RESPONSES TO PHQ QUESTIONS 1-9: 0
SUM OF ALL RESPONSES TO PHQ QUESTIONS 1-9: 0
SUM OF ALL RESPONSES TO PHQ9 QUESTIONS 1 & 2: 0

## 2024-09-19 ASSESSMENT — LIFESTYLE VARIABLES
HOW OFTEN DO YOU HAVE A DRINK CONTAINING ALCOHOL: NEVER
HOW MANY STANDARD DRINKS CONTAINING ALCOHOL DO YOU HAVE ON A TYPICAL DAY: PATIENT DOES NOT DRINK

## 2024-09-19 ASSESSMENT — ENCOUNTER SYMPTOMS: SHORTNESS OF BREATH: 0

## 2024-09-20 ENCOUNTER — TELEPHONE (OUTPATIENT)
Dept: FAMILY MEDICINE CLINIC | Age: 64
End: 2024-09-20

## 2024-09-22 DIAGNOSIS — E11.9 TYPE 2 DIABETES MELLITUS WITHOUT COMPLICATION, WITH LONG-TERM CURRENT USE OF INSULIN (HCC): ICD-10-CM

## 2024-09-22 DIAGNOSIS — Z79.4 TYPE 2 DIABETES MELLITUS WITHOUT COMPLICATION, WITH LONG-TERM CURRENT USE OF INSULIN (HCC): ICD-10-CM

## 2024-10-09 ENCOUNTER — OFFICE VISIT (OUTPATIENT)
Dept: ENDOCRINOLOGY | Age: 64
End: 2024-10-09
Payer: MEDICARE

## 2024-10-09 VITALS
HEART RATE: 106 BPM | HEIGHT: 72 IN | WEIGHT: 208 LBS | DIASTOLIC BLOOD PRESSURE: 74 MMHG | BODY MASS INDEX: 28.17 KG/M2 | SYSTOLIC BLOOD PRESSURE: 131 MMHG

## 2024-10-09 DIAGNOSIS — I10 PRIMARY HYPERTENSION: ICD-10-CM

## 2024-10-09 DIAGNOSIS — Z79.4 TYPE 2 DIABETES MELLITUS WITHOUT COMPLICATION, WITH LONG-TERM CURRENT USE OF INSULIN (HCC): Primary | ICD-10-CM

## 2024-10-09 DIAGNOSIS — E83.42 HYPOMAGNESEMIA: ICD-10-CM

## 2024-10-09 DIAGNOSIS — E11.9 TYPE 2 DIABETES MELLITUS WITHOUT COMPLICATION, WITH LONG-TERM CURRENT USE OF INSULIN (HCC): Primary | ICD-10-CM

## 2024-10-09 PROBLEM — E11.22 TYPE 2 DIABETES MELLITUS WITH CHRONIC KIDNEY DISEASE (HCC): Status: ACTIVE | Noted: 2024-10-09

## 2024-10-09 LAB
ALBUMIN SERPL-MCNC: 4.6 G/DL (ref 3.4–5)
ALBUMIN/GLOB SERPL: 1.8 {RATIO} (ref 1.1–2.2)
ALP SERPL-CCNC: 59 U/L (ref 40–129)
ALT SERPL-CCNC: 29 U/L (ref 10–40)
ANION GAP SERPL CALCULATED.3IONS-SCNC: 14 MMOL/L (ref 3–16)
AST SERPL-CCNC: 27 U/L (ref 15–37)
BILIRUB SERPL-MCNC: 0.4 MG/DL (ref 0–1)
BUN SERPL-MCNC: 22 MG/DL (ref 7–20)
CALCIUM SERPL-MCNC: 10.4 MG/DL (ref 8.3–10.6)
CHLORIDE SERPL-SCNC: 104 MMOL/L (ref 99–110)
CO2 SERPL-SCNC: 24 MMOL/L (ref 21–32)
CREAT SERPL-MCNC: 1.2 MG/DL (ref 0.8–1.3)
GFR SERPLBLD CREATININE-BSD FMLA CKD-EPI: 67 ML/MIN/{1.73_M2}
GLUCOSE SERPL-MCNC: 50 MG/DL (ref 70–99)
MAGNESIUM SERPL-MCNC: 1.13 MG/DL (ref 1.8–2.4)
PHOSPHATE SERPL-MCNC: 3.4 MG/DL (ref 2.5–4.9)
POTASSIUM SERPL-SCNC: 4 MMOL/L (ref 3.5–5.1)
PROT SERPL-MCNC: 7.1 G/DL (ref 6.4–8.2)
SODIUM SERPL-SCNC: 142 MMOL/L (ref 136–145)

## 2024-10-09 PROCEDURE — 1123F ACP DISCUSS/DSCN MKR DOCD: CPT | Performed by: INTERNAL MEDICINE

## 2024-10-09 PROCEDURE — 3078F DIAST BP <80 MM HG: CPT | Performed by: INTERNAL MEDICINE

## 2024-10-09 PROCEDURE — 3051F HG A1C>EQUAL 7.0%<8.0%: CPT | Performed by: INTERNAL MEDICINE

## 2024-10-09 PROCEDURE — 3075F SYST BP GE 130 - 139MM HG: CPT | Performed by: INTERNAL MEDICINE

## 2024-10-09 PROCEDURE — 99204 OFFICE O/P NEW MOD 45 MIN: CPT | Performed by: INTERNAL MEDICINE

## 2024-10-09 PROCEDURE — G2211 COMPLEX E/M VISIT ADD ON: HCPCS | Performed by: INTERNAL MEDICINE

## 2024-10-09 RX ORDER — INSULIN LISPRO 100 [IU]/ML
INJECTION, SOLUTION INTRAVENOUS; SUBCUTANEOUS 3 TIMES DAILY
COMMUNITY

## 2024-10-09 RX ORDER — LOSARTAN POTASSIUM AND HYDROCHLOROTHIAZIDE 12.5; 5 MG/1; MG/1
1 TABLET ORAL DAILY
COMMUNITY
End: 2024-10-09 | Stop reason: ALTCHOICE

## 2024-10-09 RX ORDER — BLOOD-GLUCOSE SENSOR
EACH MISCELLANEOUS
Qty: 2 EACH | Refills: 5 | Status: SHIPPED | OUTPATIENT
Start: 2024-10-09

## 2024-10-09 RX ORDER — DIAZEPAM 2 MG
1.5 TABLET ORAL EVERY 6 HOURS PRN
COMMUNITY

## 2024-10-09 RX ORDER — LOSARTAN POTASSIUM 50 MG/1
25 TABLET ORAL DAILY
Qty: 90 TABLET | Refills: 1 | Status: SHIPPED | OUTPATIENT
Start: 2024-10-09

## 2024-10-09 RX ORDER — BUDESONIDE 3 MG/1
6 CAPSULE, COATED PELLETS ORAL 3 TIMES DAILY
COMMUNITY

## 2024-10-09 RX ORDER — VITAMIN B COMPLEX
1 CAPSULE ORAL DAILY
COMMUNITY

## 2024-10-09 NOTE — PATIENT INSTRUCTIONS
INTRODUCTION -- The following instructions will guide you in the proper collection of a 24-hour urine specimen. In some instances, you will be asked to collect two or three consecutive 24-hour urine samples.    INSTRUCTIONS:  ?You should collect every drop of urine during each 24-hour period. It does not matter how much or little urine is passed each time, as long as every drop is collected.  ?Begin the urine collection in the morning after you wake up, after you have emptied your bladder for the first time.  ?Urinate (empty the bladder) for the first time and flush it down the toilet. Note the exact time (eg, 6:15 AM). You will begin the urine collection at this time.  ?Collect every drop of urine during the day and night in an empty collection bottle. Store the bottle at room temperature or in the refrigerator.  ?If you need to have a bowel movement, any urine passed with the bowel movement should be collected. Try not to include feces with the urine collection. If feces does get mixed in, do not try to remove the feces from the urine collection bottle.  ?Finish by collecting the first urine passed the next morning, adding it to the collection bottle. This should be within ten minutes before or after the time of the first morning void on the first day (which was flushed). In this example, you would try to void between 6:05 and 6:25 on the second day.  If you need to urinate one hour before the final collection time, drink a full glass of water so that you can void again at the appropriate time. If you have to urinate 20 minutes before, try to hold the urine until the proper time.  Please note the exact time of the final collection, even if it is not the same time as when collection began on day 1.    STORAGE -- The bottle(s) may be kept at room temperature for a day or two, but should be kept cool or refrigerated for longer periods of time.  WHERE TO GET MORE INFORMATION -- Your healthcare provider is the best

## 2024-10-09 NOTE — PROGRESS NOTES
LDL Cholesterol (mg/dL)   Date Value   09/19/2024 44     LDL Calculated (mg/dL)   Date Value   11/09/2022 57   02/13/2021 53     HDL (mg/dL)   Date Value   09/19/2024 68 (H)   11/09/2022 53     Creatinine (mg/dL)   Date Value   09/19/2024 1.3   05/09/2024 1.8 (H)     Microalb/Creat Ratio (no units)   Date Value   05/04/2022 <30   09/09/2019 <30mg/g         Orders Only on 09/19/2024   Component Date Value Ref Range Status    Magnesium 09/19/2024 1.11 (L)  1.80 - 2.40 mg/dL Final    Vit D, 25-Hydroxy 09/19/2024 31.6  >=30 ng/mL Final    PSA 09/19/2024 0.92  0.00 - 4.00 ng/mL Final    TSH 09/19/2024 1.02  0.27 - 4.20 uIU/mL Final    Vitamin B-12 09/19/2024 <150 (L)  211 - 911 pg/mL Final    Folate 09/19/2024 11.30  4.78 - 24.20 ng/mL Final    Cholesterol, Total 09/19/2024 130  0 - 199 mg/dL Final    Triglycerides 09/19/2024 92  0 - 150 mg/dL Final    HDL 09/19/2024 68 (H)  40 - 60 mg/dL Final    LDL Cholesterol 09/19/2024 44  <100 mg/dL Final    VLDL Cholesterol Calculated 09/19/2024 18  Not Established mg/dL Final    Hemoglobin A1C 09/19/2024 7.5  See comment % Final    Estimated Avg Glucose 09/19/2024 168.6  mg/dL Final    Sodium 09/19/2024 142  136 - 145 mmol/L Final    Potassium 09/19/2024 3.7  3.5 - 5.1 mmol/L Final    Chloride 09/19/2024 103  99 - 110 mmol/L Final    CO2 09/19/2024 23  21 - 32 mmol/L Final    Anion Gap 09/19/2024 16  3 - 16 Final    Glucose 09/19/2024 85  70 - 99 mg/dL Final    BUN 09/19/2024 26 (H)  7 - 20 mg/dL Final    Creatinine 09/19/2024 1.3  0.8 - 1.3 mg/dL Final    Est, Glom Filt Rate 09/19/2024 61  >60 Final    Calcium 09/19/2024 10.1  8.3 - 10.6 mg/dL Final    Total Protein 09/19/2024 7.2  6.4 - 8.2 g/dL Final    Albumin 09/19/2024 4.6  3.4 - 5.0 g/dL Final    Albumin/Globulin Ratio 09/19/2024 1.8  1.1 - 2.2 Final    Total Bilirubin 09/19/2024 0.5  0.0 - 1.0 mg/dL Final    Alkaline Phosphatase 09/19/2024 52  40 - 129 U/L Final    ALT 09/19/2024 17  10 - 40 U/L Final    [facility]

## 2024-10-14 DIAGNOSIS — E83.42 HYPOMAGNESEMIA: ICD-10-CM

## 2024-10-14 LAB
CALCIUM 24H UR-MRATE: 47 MG/24 HR (ref 42–353)
CREAT 24H UR-MRATE: 1 G/24HR (ref 0.6–2.5)
MAGNESIUM 24H UR-MRATE: 114 MG/24 HR (ref 24–255)
SPECIMEN VOL 24H UR: 1000 ML

## 2024-10-15 LAB
COLLECT DURATION TIME UR: 24 HR
CREAT 24H UR-MRATE: 1 G/24HR (ref 0.6–2.5)
CREAT CL 24H UR+SERPL-VRATE: 47 ML/MIN (ref 100–140)
CREAT SERPL-MCNC: 1.2 MG/DL (ref 0.8–1.3)
SPECIMEN VOL 24H UR: 1000 ML

## 2024-10-17 DIAGNOSIS — Z79.4 TYPE 2 DIABETES MELLITUS WITHOUT COMPLICATION, WITH LONG-TERM CURRENT USE OF INSULIN (HCC): Primary | ICD-10-CM

## 2024-10-17 DIAGNOSIS — E11.9 TYPE 2 DIABETES MELLITUS WITHOUT COMPLICATION, WITH LONG-TERM CURRENT USE OF INSULIN (HCC): Primary | ICD-10-CM

## 2024-10-18 ENCOUNTER — PATIENT MESSAGE (OUTPATIENT)
Dept: ENDOCRINOLOGY | Age: 64
End: 2024-10-18

## 2024-10-30 DIAGNOSIS — F51.02 ADJUSTMENT INSOMNIA: ICD-10-CM

## 2024-10-30 NOTE — TELEPHONE ENCOUNTER
Refill Request - Controlled Substance    CONFIRM preferred pharmacy with the patient.    If Mail Order Rx - Pend for 90 day refill.        Last Seen Department: 9/19/2024  Last Seen by PCP: 10/18/2023    Last Written: 8/27/2024    Last UDS: 12/19/2017    Med Agreement Signed On: 11/9/2022    If no future appointment scheduled:  Review the last OV with PCP and review information for follow-up visit,  Route STAFF MESSAGE with patient name to the  Pool for scheduling with the following information:            -  Timing of next visit           -  Visit type ie Physical, OV, etc           -  Diagnoses/Reason ie. COPD, HTN - Do not use MEDICATION, Follow-up or CHECK UP - Give reason for visit        Next Appointment:   Future Appointments   Date Time Provider Department Center   11/6/2024 11:40 AM Anne Harrington MD [facility]   11/11/2024  9:00 AM Miguel Snow DO [facility] DEP       Message sent to  to schedule appt with patient?  NO      Requested Prescriptions     Pending Prescriptions Disp Refills    diazePAM (VALIUM) 5 MG tablet [Pharmacy Med Name: DIAZEPAM 5 MG TABLET] 50 tablet 1     Sig: TAKE 1 TABLET BY MOUTH TWICE A DAY AS NEEDED FOR ANXIETY. MUST LAST 30 DAYS

## 2024-10-31 RX ORDER — DIAZEPAM 5 MG/1
TABLET ORAL
Qty: 50 TABLET | Refills: 1 | Status: SHIPPED | OUTPATIENT
Start: 2024-10-31 | End: 2024-11-29

## 2024-11-01 DIAGNOSIS — E11.9 TYPE 2 DIABETES MELLITUS WITHOUT COMPLICATION, WITH LONG-TERM CURRENT USE OF INSULIN (HCC): ICD-10-CM

## 2024-11-01 DIAGNOSIS — Z79.4 TYPE 2 DIABETES MELLITUS WITHOUT COMPLICATION, WITH LONG-TERM CURRENT USE OF INSULIN (HCC): ICD-10-CM

## 2024-11-01 RX ORDER — INSULIN GLARGINE 100 [IU]/ML
30 INJECTION, SOLUTION SUBCUTANEOUS 2 TIMES DAILY
Qty: 60 ML | Refills: 5 | Status: SHIPPED | OUTPATIENT
Start: 2024-11-01

## 2024-11-01 NOTE — TELEPHONE ENCOUNTER
Refill Request     CONFIRM preferred pharmacy with the patient.    If Mail Order Rx - Pend for 90 day refill.      Last Seen: Last Seen Department: 9/19/2024  Last Seen by PCP: 1/30/2024    Last Written: 10/9/24 54 with 1 refill     If no future appointment scheduled:  Review the last OV with PCP and review information for follow-up visit,  Route STAFF MESSAGE with patient name to the  Pool for scheduling with the following information:            -  Timing of next visit           -  Visit type ie Physical, OV, etc           -  Diagnoses/Reason ie. COPD, HTN - Do not use MEDICATION, Follow-up or CHECK UP - Give reason for visit      Next Appointment:   Future Appointments   Date Time Provider Department Center   11/6/2024 11:40 AM Anne Harrington MD AND [facility]   11/11/2024  9:00 AM Miguel Snow DO [facility] DEP       Message sent to  to schedule appt with patient?  NO      Requested Prescriptions     Pending Prescriptions Disp Refills    LANTUS SOLOSTAR 100 UNIT/ML injection pen [Pharmacy Med Name: LANTUS SOLOSTAR 100 UNIT/ML]  1     Sig: INJECT 30 UNITS INTO THE SKIN 2 TIMES DAILY

## 2024-11-04 DIAGNOSIS — Z79.4 TYPE 2 DIABETES MELLITUS WITHOUT COMPLICATION, WITH LONG-TERM CURRENT USE OF INSULIN (HCC): ICD-10-CM

## 2024-11-04 DIAGNOSIS — E83.42 HYPOMAGNESEMIA: ICD-10-CM

## 2024-11-04 DIAGNOSIS — E11.9 TYPE 2 DIABETES MELLITUS WITHOUT COMPLICATION, WITH LONG-TERM CURRENT USE OF INSULIN (HCC): ICD-10-CM

## 2024-11-05 LAB
ALBUMIN SERPL-MCNC: 4.3 G/DL (ref 3.4–5)
ANION GAP SERPL CALCULATED.3IONS-SCNC: 10 MMOL/L (ref 3–16)
BUN SERPL-MCNC: 30 MG/DL (ref 7–20)
CALCIUM SERPL-MCNC: 10.1 MG/DL (ref 8.3–10.6)
CHLORIDE SERPL-SCNC: 104 MMOL/L (ref 99–110)
CO2 SERPL-SCNC: 29 MMOL/L (ref 21–32)
CREAT SERPL-MCNC: 1.2 MG/DL (ref 0.8–1.3)
GFR SERPLBLD CREATININE-BSD FMLA CKD-EPI: 67 ML/MIN/{1.73_M2}
GLUCOSE SERPL-MCNC: 104 MG/DL (ref 70–99)
MAGNESIUM SERPL-MCNC: 1.25 MG/DL (ref 1.8–2.4)
PHOSPHATE SERPL-MCNC: 3.7 MG/DL (ref 2.5–4.9)
POTASSIUM SERPL-SCNC: 4.3 MMOL/L (ref 3.5–5.1)
SODIUM SERPL-SCNC: 143 MMOL/L (ref 136–145)

## 2024-11-06 ENCOUNTER — OFFICE VISIT (OUTPATIENT)
Dept: ENDOCRINOLOGY | Age: 64
End: 2024-11-06

## 2024-11-06 VITALS
HEIGHT: 72 IN | WEIGHT: 203 LBS | BODY MASS INDEX: 27.5 KG/M2 | HEART RATE: 87 BPM | SYSTOLIC BLOOD PRESSURE: 136 MMHG | DIASTOLIC BLOOD PRESSURE: 84 MMHG

## 2024-11-06 DIAGNOSIS — E11.9 TYPE 2 DIABETES MELLITUS WITHOUT COMPLICATION, WITH LONG-TERM CURRENT USE OF INSULIN (HCC): Primary | ICD-10-CM

## 2024-11-06 DIAGNOSIS — Z79.4 TYPE 2 DIABETES MELLITUS WITHOUT COMPLICATION, WITH LONG-TERM CURRENT USE OF INSULIN (HCC): Primary | ICD-10-CM

## 2024-11-06 DIAGNOSIS — K86.1 OTHER CHRONIC PANCREATITIS (HCC): ICD-10-CM

## 2024-11-06 DIAGNOSIS — E83.42 HYPOMAGNESEMIA: ICD-10-CM

## 2024-11-06 DIAGNOSIS — K86.81 EXOCRINE PANCREATIC INSUFFICIENCY: ICD-10-CM

## 2024-11-06 NOTE — PROGRESS NOTES
logging, proper diabetes management, diabetic complications with poor management and the importance of glycemic control in order to avoid the complications of diabetes.    Risks and potential complications of diabetes were reviewed with the patient.    Return in about 3 months (around 2/6/2025) for Diabetes.    Anne Harrington MD   12:38 PM  11/6/2024    Thank you very much for allowing me to take care of this patient along with you.    Comment: This documentation utilized a software-based speech recognition technology (voice-to-text process), where occasional errors in transcription can occur.

## 2024-11-11 ENCOUNTER — OFFICE VISIT (OUTPATIENT)
Dept: FAMILY MEDICINE CLINIC | Age: 64
End: 2024-11-11

## 2024-11-11 VITALS
SYSTOLIC BLOOD PRESSURE: 130 MMHG | DIASTOLIC BLOOD PRESSURE: 70 MMHG | HEART RATE: 87 BPM | WEIGHT: 202.2 LBS | OXYGEN SATURATION: 95 % | BODY MASS INDEX: 27.42 KG/M2

## 2024-11-11 DIAGNOSIS — E11.9 TYPE 2 DIABETES MELLITUS WITHOUT COMPLICATION, WITH LONG-TERM CURRENT USE OF INSULIN (HCC): ICD-10-CM

## 2024-11-11 DIAGNOSIS — K21.9 GASTROESOPHAGEAL REFLUX DISEASE WITHOUT ESOPHAGITIS: ICD-10-CM

## 2024-11-11 DIAGNOSIS — F51.02 ADJUSTMENT INSOMNIA: Primary | ICD-10-CM

## 2024-11-11 DIAGNOSIS — Z79.4 TYPE 2 DIABETES MELLITUS WITHOUT COMPLICATION, WITH LONG-TERM CURRENT USE OF INSULIN (HCC): ICD-10-CM

## 2024-11-11 DIAGNOSIS — I10 ESSENTIAL HYPERTENSION: ICD-10-CM

## 2024-11-11 RX ORDER — ZOLPIDEM TARTRATE 5 MG/1
5 TABLET ORAL NIGHTLY PRN
Qty: 30 TABLET | Refills: 0 | Status: SHIPPED | OUTPATIENT
Start: 2024-11-11 | End: 2024-12-11

## 2024-11-11 ASSESSMENT — ENCOUNTER SYMPTOMS
BACK PAIN: 1
COUGH: 0
BLOOD IN STOOL: 0
SORE THROAT: 0
CONSTIPATION: 0
CHEST TIGHTNESS: 0
SHORTNESS OF BREATH: 0
RHINORRHEA: 0
ABDOMINAL PAIN: 0

## 2024-11-11 NOTE — PROGRESS NOTES
Subjective:      Patient ID: Zafar Chong is a 64 y.o. male.    HPI  Patient in for 6-month checkup on several medical issues.  He states one of his problems which is probably been for the last year as he has problems going to sleep and once he does which may take 3 or 4 hours he sleeps very well.  Hypertension-on medication and well-controlled.  GERD-on medication and well-controlled.  Diabetes-he does go to an endocrinologist.        Review of Systems    Review of Systems   Constitutional:  Negative for unexpected weight change.   HENT:  Negative for congestion, postnasal drip, rhinorrhea and sore throat.    Eyes:  Negative for visual disturbance.   Respiratory:  Negative for cough, chest tightness and shortness of breath.    Cardiovascular:  Negative for chest pain, palpitations and leg swelling.   Gastrointestinal:  Negative for abdominal pain, blood in stool and constipation.        No gerd   Genitourinary:  Negative for dysuria, frequency and hematuria.        No nocturia   Musculoskeletal:  Positive for arthralgias and back pain. Negative for myalgias.   Skin:  Negative for pallor and rash.   Neurological:  Negative for tremors, syncope and headaches.   Psychiatric/Behavioral:  Positive for sleep disturbance. The patient is nervous/anxious.         See HPI       Objective:   Physical Exam      Physical Exam  Constitutional:       General: He is not in acute distress.     Appearance: Normal appearance. He is well-developed and normal weight. He is not ill-appearing.   HENT:      Head: Normocephalic.   Eyes:      Conjunctiva/sclera: Conjunctivae normal.   Neck:      Thyroid: No thyromegaly.      Vascular: No carotid bruit.   Cardiovascular:      Rate and Rhythm: Normal rate and regular rhythm.      Pulses: Normal pulses.      Heart sounds: Normal heart sounds.   Pulmonary:      Effort: Pulmonary effort is normal.      Breath sounds: Normal breath sounds.   Abdominal:      General: There is no distension.

## 2024-11-30 DIAGNOSIS — F51.02 ADJUSTMENT INSOMNIA: Primary | ICD-10-CM

## 2024-11-30 RX ORDER — TRIAZOLAM 0.25 MG
0.25 TABLET ORAL NIGHTLY PRN
Qty: 20 TABLET | Refills: 0 | Status: SHIPPED | OUTPATIENT
Start: 2024-11-30 | End: 2024-12-20

## 2024-12-02 ENCOUNTER — TELEPHONE (OUTPATIENT)
Dept: FAMILY MEDICINE CLINIC | Age: 64
End: 2024-12-02

## 2024-12-02 NOTE — TELEPHONE ENCOUNTER
Submitted PA for Triazolam 0.25MG tablets   Via CMM Key: [id number]  STATUS: PENDING.    Follow up done daily; if no decision with in three days we will refax.  If another three days goes by with no decision will call the insurance for status.

## 2024-12-04 ENCOUNTER — TELEPHONE (OUTPATIENT)
Dept: FAMILY MEDICINE CLINIC | Age: 64
End: 2024-12-04

## 2024-12-04 NOTE — TELEPHONE ENCOUNTER
Patient called into the office stating that his insurance will not cover the Triazolam, but patient states that the pharmacy will cover the medication Temazepam.  Patient would like the medication switched if possible.  Patient has not filled the other medication.  CASPER, please advise.

## 2024-12-15 NOTE — TELEPHONE ENCOUNTER
Refill Request     CONFIRM preferred pharmacy with the patient.    If Mail Order Rx - Pend for 90 day refill.      Last Seen: Last Seen Department: 11/11/2024  Last Seen by PCP: 11/11/2024    Last Written: 6/13/2024 3 each 1 refills    If no future appointment scheduled:  Review the last OV with PCP and review information for follow-up visit,  Route STAFF MESSAGE with patient name to the  Pool for scheduling with the following information:            -  Timing of next visit           -  Visit type ie Physical, OV, etc           -  Diagnoses/Reason ie. COPD, HTN - Do not use MEDICATION, Follow-up or CHECK UP - Give reason for visit      Next Appointment:   Future Appointments   Date Time Provider Department Center   2/25/2025 10:20 AM Anne Harrington MD AND [facility]   5/12/2025  8:30 AM Rikki Waddell DO [facility] DEP       Message sent to  to schedule appt with patient?  NO      Requested Prescriptions     Pending Prescriptions Disp Refills    fluticasone (FLONASE) 50 MCG/ACT nasal spray [Pharmacy Med Name: FLUTICASONE PROP 50 MCG SPRAY]  1     Sig: SPRAY 2 SPRAYS INTO EACH NOSTRIL EVERY DAY

## 2024-12-16 RX ORDER — FLUTICASONE PROPIONATE 50 MCG
2 SPRAY, SUSPENSION (ML) NASAL DAILY
Qty: 3 EACH | Refills: 1 | Status: SHIPPED | OUTPATIENT
Start: 2024-12-16

## 2024-12-17 DIAGNOSIS — K21.9 GASTROESOPHAGEAL REFLUX DISEASE WITHOUT ESOPHAGITIS: ICD-10-CM

## 2024-12-18 ENCOUNTER — PATIENT MESSAGE (OUTPATIENT)
Dept: FAMILY MEDICINE CLINIC | Age: 64
End: 2024-12-18

## 2024-12-18 DIAGNOSIS — I10 ESSENTIAL HYPERTENSION: Primary | ICD-10-CM

## 2024-12-18 DIAGNOSIS — F51.02 ADJUSTMENT INSOMNIA: Primary | ICD-10-CM

## 2024-12-18 RX ORDER — TEMAZEPAM 15 MG/1
15 CAPSULE ORAL NIGHTLY PRN
Qty: 30 CAPSULE | Refills: 0 | Status: SHIPPED | OUTPATIENT
Start: 2024-12-18 | End: 2025-01-17

## 2024-12-18 NOTE — TELEPHONE ENCOUNTER
Refill Request     CONFIRM preferred pharmacy with the patient.    If Mail Order Rx - Pend for 90 day refill.      Last Seen: Last Seen Department: 11/11/2024  Last Seen by PCP: 11/11/2024    Last Written: NA- Historical Provider. We have never prescribed this for the patient.     If no future appointment scheduled:  Review the last OV with PCP and review information for follow-up visit,  Route STAFF MESSAGE with patient name to the  Pool for scheduling with the following information:            -  Timing of next visit           -  Visit type ie Physical, OV, etc           -  Diagnoses/Reason ie. COPD, HTN - Do not use MEDICATION, Follow-up or CHECK UP - Give reason for visit      Next Appointment:   Future Appointments   Date Time Provider Department Center   2/25/2025 10:20 AM Anne Harrington MD [facility]   5/12/2025  8:30 AM Rikki Waddell DO [facility]CC DEP       Message sent to  to schedule appt with patient?  NO      Requested Prescriptions     Pending Prescriptions Disp Refills    insulin lispro, 1 Unit Dial, (HUMALOG/ADMELOG) 100 UNIT/ML SOPN [Pharmacy Med Name: INSULIN LISPRO 100 UNIT/ML PEN]  5     Sig: UP TO 15 UNITS TWICE A DAY BEFORE MEALS -ACCORDING TO SLIDING SCALE.

## 2024-12-18 NOTE — TELEPHONE ENCOUNTER
Refill Request     CONFIRM preferred pharmacy with the patient.    If Mail Order Rx - Pend for 90 day refill.      Last Seen: Last Seen Department: 11/11/2024  Last Seen by PCP: 9/19/2024    Last Written: 9/19/24 60 with 5 refills     If no future appointment scheduled:  Review the last OV with PCP and review information for follow-up visit,  Route STAFF MESSAGE with patient name to the  Pool for scheduling with the following information:            -  Timing of next visit           -  Visit type ie Physical, OV, etc           -  Diagnoses/Reason ie. COPD, HTN - Do not use MEDICATION, Follow-up or CHECK UP - Give reason for visit      Next Appointment:   Future Appointments   Date Time Provider Department Center   2/25/2025 10:20 AM Anne Harrington MD AND [facility]   5/12/2025  8:30 AM Rikki Waddell DO [facility] DEP       Message sent to  to schedule appt with patient?  NO      Requested Prescriptions     Pending Prescriptions Disp Refills    famotidine (PEPCID) 20 MG tablet [Pharmacy Med Name: FAMOTIDINE 20 MG TABLET] 180 tablet 1     Sig: TAKE 1 TABLET BY MOUTH TWICE A DAY AS NEEDED FOR HEARTBURN

## 2024-12-19 ENCOUNTER — PATIENT MESSAGE (OUTPATIENT)
Dept: ENDOCRINOLOGY | Age: 64
End: 2024-12-19

## 2024-12-19 RX ORDER — INSULIN LISPRO 100 [IU]/ML
INJECTION, SOLUTION INTRAVENOUS; SUBCUTANEOUS
Qty: 9 ML | Refills: 2 | Status: SHIPPED | OUTPATIENT
Start: 2024-12-19

## 2024-12-19 RX ORDER — FAMOTIDINE 20 MG/1
TABLET, FILM COATED ORAL
Qty: 180 TABLET | Refills: 1 | Status: SHIPPED | OUTPATIENT
Start: 2024-12-19

## 2024-12-19 RX ORDER — LOSARTAN POTASSIUM AND HYDROCHLOROTHIAZIDE 12.5; 1 MG/1; MG/1
1 TABLET ORAL DAILY
Qty: 90 TABLET | Refills: 1 | Status: SHIPPED | OUTPATIENT
Start: 2024-12-19

## 2024-12-20 RX ORDER — INSULIN LISPRO 100 [IU]/ML
INJECTION, SOLUTION INTRAVENOUS; SUBCUTANEOUS
Refills: 5 | OUTPATIENT
Start: 2024-12-20

## 2024-12-30 RX ORDER — ATORVASTATIN CALCIUM 40 MG/1
TABLET, FILM COATED ORAL
Qty: 90 TABLET | Refills: 0 | Status: SHIPPED | OUTPATIENT
Start: 2024-12-30

## 2024-12-30 NOTE — TELEPHONE ENCOUNTER
Refill Request     CONFIRM preferred pharmacy with the patient.    If Mail Order Rx - Pend for 90 day refill.      Last Seen: Last Seen Department: 11/11/2024  Last Seen by PCP: 9/19/2024    Last Written: 8/16/22 90 tabs 2 refills (patient states that his mail order previous would send large quantities, more than 90 days he states, and he just finished what he had)    If no future appointment scheduled:  Review the last OV with PCP and review information for follow-up visit,  Route STAFF MESSAGE with patient name to the  Pool for scheduling with the following information:            -  Timing of next visit           -  Visit type ie Physical, OV, etc           -  Diagnoses/Reason ie. COPD, HTN - Do not use MEDICATION, Follow-up or CHECK UP - Give reason for visit      Next Appointment:   Future Appointments   Date Time Provider Department Center   2/25/2025 10:20 AM Anne Harrington MD [facility]   5/12/2025  8:30 AM Rikki Waddell DO [facility] DEP       Message sent to  to schedule appt with patient?  NO      Requested Prescriptions     Pending Prescriptions Disp Refills    atorvastatin (LIPITOR) 40 MG tablet 90 tablet 0     Sig: Take 1 tablet by mouth every day

## 2024-12-31 ENCOUNTER — TELEPHONE (OUTPATIENT)
Dept: FAMILY MEDICINE CLINIC | Age: 64
End: 2024-12-31

## 2024-12-31 DIAGNOSIS — Z79.4 TYPE 2 DIABETES MELLITUS WITH CHRONIC KIDNEY DISEASE, WITH LONG-TERM CURRENT USE OF INSULIN, UNSPECIFIED CKD STAGE (HCC): ICD-10-CM

## 2024-12-31 DIAGNOSIS — E11.22 TYPE 2 DIABETES MELLITUS WITH CHRONIC KIDNEY DISEASE, WITH LONG-TERM CURRENT USE OF INSULIN, UNSPECIFIED CKD STAGE (HCC): ICD-10-CM

## 2024-12-31 DIAGNOSIS — I10 PRIMARY HYPERTENSION: Primary | ICD-10-CM

## 2024-12-31 RX ORDER — BLOOD PRESSURE TEST KIT
1 KIT MISCELLANEOUS 3 TIMES DAILY
Qty: 1 KIT | Refills: 0 | Status: SHIPPED | OUTPATIENT
Start: 2024-12-31

## 2024-12-31 NOTE — TELEPHONE ENCOUNTER
Patient is asking for a blood pressure machine script to be faxed to [phone], or call Josiane SILVER, at [phone]    Patient has limited income, will let our office know if he can afford it once received.     Thanks.

## 2025-01-03 ENCOUNTER — TELEPHONE (OUTPATIENT)
Dept: FAMILY MEDICINE CLINIC | Age: 65
End: 2025-01-03

## 2025-01-03 NOTE — TELEPHONE ENCOUNTER
Dr. Snow does not recall seeing him for this issue.  Is he thinking about an evaluation by another provider?  Was he given anything that helped?

## 2025-01-03 NOTE — TELEPHONE ENCOUNTER
Patient called the office stating that he is having an increase in pain and redness by his coccyx. He has been dealing with this pain for 3 years but it seem to be getting worse now. Patient has tried using a donut and changes positions frequently.     Offered appt for evaluation- declined as he said this was seen last year.     Asked if he had any trauma to the area- denied.     Any signes of a cyst- denied    Pharmacy [facility]

## 2025-01-07 ENCOUNTER — TELEPHONE (OUTPATIENT)
Dept: CARDIOLOGY CLINIC | Age: 65
End: 2025-01-07

## 2025-01-07 ENCOUNTER — PATIENT MESSAGE (OUTPATIENT)
Dept: FAMILY MEDICINE CLINIC | Age: 65
End: 2025-01-07

## 2025-01-07 NOTE — TELEPHONE ENCOUNTER
The patient can try lidocaine 4% which is the among the strongest over-the-counter topical anesthetics.  Anesthetics block pain so they can help with his buttock pain.  If he has any open sores in his buttock area, that is when wound care would be appropriate.

## 2025-01-07 NOTE — TELEPHONE ENCOUNTER
Spoke with patient, He states both providers aware of his tail bone. He states  told him he doesn't know what he can do about this issue. Patient asking for a preventive pain cream be sent into his pharmacy or what OTC creams provider recommends he uses. He does not have any open areas on buttocks, just red and sore. Declined office visit states it would be a waste of time to be seen due to already being seen for this issue.     Please advise

## 2025-01-07 NOTE — TELEPHONE ENCOUNTER
Pt stated that his BP is all over the place. Pt stated pt normally is at 125/80 and now it is about 130-135. BP now is 140/100. Pt stated he sometimes feels lightheaded but it comes and goes. Pt had losartan changed from 50 mg to 100 mg by Nicho MURGUIA. PT believe it could be his home bp machines being off. Pt wants to know if he can make an appt w/ [facility]. Please advise    Call back: [phone]

## 2025-01-07 NOTE — TELEPHONE ENCOUNTER
Patient sent my chart message regarding message left  Informed pt he will need appt  Will reply via [employer]

## 2025-01-08 RX ORDER — LIDOCAINE 5 G/100G
CREAM RECTAL; TOPICAL
Qty: 14 G | Refills: 1 | Status: SHIPPED | OUTPATIENT
Start: 2025-01-08

## 2025-01-08 NOTE — TELEPHONE ENCOUNTER
Pt due for yearly in Feb    [facility] do you want pt to come in for bp check in office to compare and make appt with you in Feb? Or do you want to see pt in ov sooner?

## 2025-01-08 NOTE — TELEPHONE ENCOUNTER
Lets have patient come in for blood pressure check.  '  Have him bring his blood pressure machine and also

## 2025-01-08 NOTE — TELEPHONE ENCOUNTER
Ok noted, thanks Dr. Snow.   called patient regarding status on [facility] Radiation consult. Patient called me back and left VM. called patient back and no answer. Patients referral was originally sent to [employer] and received a call stating [facility] does not accept patients insurance. Referral was then sent to [facility]. checknig status.         Electronically signed by:Sadaf Lackey   May  9 2017 10:57AM CST

## 2025-01-09 ENCOUNTER — LAB (OUTPATIENT)
Dept: CARDIOLOGY CLINIC | Age: 65
End: 2025-01-09

## 2025-01-09 ENCOUNTER — TELEPHONE (OUTPATIENT)
Dept: CARDIOLOGY CLINIC | Age: 65
End: 2025-01-09

## 2025-01-09 VITALS — DIASTOLIC BLOOD PRESSURE: 80 MMHG | SYSTOLIC BLOOD PRESSURE: 140 MMHG | HEART RATE: 79 BPM

## 2025-01-09 DIAGNOSIS — I10 PRIMARY HYPERTENSION: Primary | ICD-10-CM

## 2025-01-09 RX ORDER — ADHESIVE BANDAGE 3/4"
1 BANDAGE TOPICAL ONCE
Qty: 1 EACH | Refills: 0 | Status: SHIPPED | OUTPATIENT
Start: 2025-01-09 | End: 2025-01-09

## 2025-01-09 NOTE — TELEPHONE ENCOUNTER
Pt presented in office for BP check with home monitor. Home BP averages 120-140 systolic. Pt reports his diabetic doctor increased HYZAAR 2 weeks ago. He has not noticed an improvement in BP since. Pt given prescription for BP cuff.  Please advise     Home /97  Manual /80

## 2025-01-10 RX ORDER — AMLODIPINE BESYLATE 5 MG/1
5 TABLET ORAL DAILY
Qty: 90 TABLET | Refills: 1 | Status: SHIPPED | OUTPATIENT
Start: 2025-01-10

## 2025-01-10 NOTE — TELEPHONE ENCOUNTER
Called and spoke with patient. Patient VU to amlodipine 5 mg daily. Confirmed pharmacy. Instructed monitoring blood pressure at home 4-5 times a week. Keep a log of blood pressure and heart rate. Goal 130/80 or less. Call office for further intervention if consistently elevated above goal.  He VU.

## 2025-01-18 DIAGNOSIS — F51.02 ADJUSTMENT INSOMNIA: ICD-10-CM

## 2025-01-20 RX ORDER — DIAZEPAM 5 MG/1
TABLET ORAL
Qty: 50 TABLET | Refills: 1 | Status: SHIPPED | OUTPATIENT
Start: 2025-01-20 | End: 2025-02-20

## 2025-01-20 NOTE — TELEPHONE ENCOUNTER
Refill Request     CONFIRM preferred pharmacy with the patient.    If Mail Order Rx - Pend for 90 day refill.      Last Seen: Last Seen Department: 11/11/2024  Last Seen by PCP: 9/19/2024    Last Written: 10/31/2024 50 tab 1 refills    If no future appointment scheduled:  Review the last OV with PCP and review information for follow-up visit,  Route STAFF MESSAGE with patient name to the  Pool for scheduling with the following information:            -  Timing of next visit           -  Visit type ie Physical, OV, etc           -  Diagnoses/Reason ie. COPD, HTN - Do not use MEDICATION, Follow-up or CHECK UP - Give reason for visit      Next Appointment:   Future Appointments   Date Time Provider Department Center   2/25/2025 10:20 AM Anne Harrington MD AND END[facility]   5/12/2025  8:30 AM Rikki Waddell DO [facility] DEP       Message sent to  to schedule appt with patient?  NO      Requested Prescriptions     Pending Prescriptions Disp Refills    diazePAM (VALIUM) 5 MG tablet [Pharmacy Med Name: DIAZEPAM 5 MG TABLET] 50 tablet 1     Sig: TAKE 1 TABLET BY MOUTH TWICE A DAY AS NEEDED FOR ANXIETY. MUST LAST 30 DAYS

## 2025-02-07 ENCOUNTER — TELEPHONE (OUTPATIENT)
Dept: CARDIOLOGY CLINIC | Age: 65
End: 2025-02-07

## 2025-02-07 ENCOUNTER — ANCILLARY PROCEDURE (OUTPATIENT)
Dept: CARDIOLOGY CLINIC | Age: 65
End: 2025-02-07

## 2025-02-07 DIAGNOSIS — R00.2 PALPITATIONS: ICD-10-CM

## 2025-02-07 RX ORDER — TADALAFIL 5 MG/1
5 TABLET ORAL DAILY
COMMUNITY
Start: 2025-01-18

## 2025-02-07 NOTE — TELEPHONE ENCOUNTER
Monitor placed by Jesika COWAN  Monitor company [employer]  Length of monitor 2 week [place]INST[place]  Monitor ordered by [facility]  Serial number [id number]  Kit ID [id number], [id number]  Activation successful prior to pt leaving office? Yes      Patient brought BP machine in to be checked for accuracy.  L/ARM machine 121/82  R/Arm machine  132/77      L/arm manual 122/62  R/arm manual 116/64      Patient instructed BP machine accurate within parameters.Patient instructed to call office in 2 weeks with BP and HR log. He MO.

## 2025-02-07 NOTE — TELEPHONE ENCOUNTER
Patient reports he was started on Cialis 5 mg daily 2 months ago, he reports since he has noticed his BP has been more elevated. Patient instructed to call office in 2 weeks with BP log. He would like to know if alternative to Cialis you would recommend and he will notify his Urologist at upcoming appt.

## 2025-02-21 ENCOUNTER — TELEPHONE (OUTPATIENT)
Dept: CARDIOLOGY CLINIC | Age: 65
End: 2025-02-21

## 2025-02-21 DIAGNOSIS — F51.02 ADJUSTMENT INSOMNIA: ICD-10-CM

## 2025-02-21 RX ORDER — TEMAZEPAM 15 MG/1
15 CAPSULE ORAL NIGHTLY PRN
Qty: 30 CAPSULE | Refills: 2 | Status: SHIPPED | OUTPATIENT
Start: 2025-02-21 | End: 2025-03-23

## 2025-02-21 NOTE — TELEPHONE ENCOUNTER
Pt presented herself in office today to drop off 2 weeks worth of blood pressure readings. Placed in VSP's folder on 02/21/25.

## 2025-02-21 NOTE — TELEPHONE ENCOUNTER
.Refill Request     CONFIRM preferred pharmacy with the patient.    If Mail Order Rx - Pend for 90 day refill.      Last Seen: Last Seen Department: 11/11/2024  Last Seen by PCP: 11/11/2024    Last Written: 12-18-24 30 with 0     If no future appointment scheduled:  Review the last OV with PCP and review information for follow-up visit,  Route STAFF MESSAGE with patient name to the  Pool for scheduling with the following information:            -  Timing of next visit           -  Visit type ie Physical, OV, etc           -  Diagnoses/Reason ie. COPD, HTN - Do not use MEDICATION, Follow-up or CHECK UP - Give reason for visit      Next Appointment:   Future Appointments   Date Time Provider Department Center   2/25/2025 10:20 AM Anne Harrington MD AND [facility]   5/12/2025  8:30 AM Rikki Waddell DO [facility] DEP       Message sent to  to schedule appt with patient?  NO      Requested Prescriptions     Pending Prescriptions Disp Refills    temazepam (RESTORIL) 15 MG capsule [Pharmacy Med Name: TEMAZEPAM 15 MG CAPSULE] 30 capsule 0     Sig: Take 1 capsule by mouth nightly as needed for Sleep for up to 30 days. Max Daily Amount: 15 mg

## 2025-02-22 ENCOUNTER — PATIENT MESSAGE (OUTPATIENT)
Dept: ENDOCRINOLOGY | Age: 65
End: 2025-02-22

## 2025-02-24 DIAGNOSIS — E11.9 TYPE 2 DIABETES MELLITUS WITHOUT COMPLICATION, WITH LONG-TERM CURRENT USE OF INSULIN (HCC): ICD-10-CM

## 2025-02-24 DIAGNOSIS — E83.42 HYPOMAGNESEMIA: ICD-10-CM

## 2025-02-24 DIAGNOSIS — Z79.4 TYPE 2 DIABETES MELLITUS WITHOUT COMPLICATION, WITH LONG-TERM CURRENT USE OF INSULIN (HCC): ICD-10-CM

## 2025-02-25 ENCOUNTER — OFFICE VISIT (OUTPATIENT)
Dept: ENDOCRINOLOGY | Age: 65
End: 2025-02-25
Payer: MEDICARE

## 2025-02-25 VITALS
WEIGHT: 207 LBS | HEART RATE: 84 BPM | SYSTOLIC BLOOD PRESSURE: 123 MMHG | DIASTOLIC BLOOD PRESSURE: 70 MMHG | HEIGHT: 72 IN | BODY MASS INDEX: 28.04 KG/M2

## 2025-02-25 DIAGNOSIS — Z79.4 TYPE 2 DIABETES MELLITUS WITHOUT COMPLICATION, WITH LONG-TERM CURRENT USE OF INSULIN (HCC): ICD-10-CM

## 2025-02-25 DIAGNOSIS — E11.9 TYPE 2 DIABETES MELLITUS WITHOUT COMPLICATION, WITH LONG-TERM CURRENT USE OF INSULIN (HCC): ICD-10-CM

## 2025-02-25 DIAGNOSIS — K86.1 OTHER CHRONIC PANCREATITIS (HCC): ICD-10-CM

## 2025-02-25 DIAGNOSIS — K86.81 EXOCRINE PANCREATIC INSUFFICIENCY: ICD-10-CM

## 2025-02-25 DIAGNOSIS — E83.42 HYPOMAGNESEMIA: Primary | ICD-10-CM

## 2025-02-25 LAB
ALBUMIN SERPL-MCNC: 4.1 G/DL (ref 3.4–5)
ALBUMIN/GLOB SERPL: 1.7 {RATIO} (ref 1.1–2.2)
ALP SERPL-CCNC: 51 U/L (ref 40–129)
ALT SERPL-CCNC: 24 U/L (ref 10–40)
ANION GAP SERPL CALCULATED.3IONS-SCNC: 12 MMOL/L (ref 3–16)
AST SERPL-CCNC: 20 U/L (ref 15–37)
BILIRUB SERPL-MCNC: 0.3 MG/DL (ref 0–1)
BUN SERPL-MCNC: 24 MG/DL (ref 7–20)
CALCIUM SERPL-MCNC: 10 MG/DL (ref 8.3–10.6)
CHLORIDE SERPL-SCNC: 100 MMOL/L (ref 99–110)
CHOLEST SERPL-MCNC: 132 MG/DL (ref 0–199)
CO2 SERPL-SCNC: 29 MMOL/L (ref 21–32)
CREAT SERPL-MCNC: 1.3 MG/DL (ref 0.8–1.3)
CREAT UR-MCNC: 341 MG/DL (ref 39–259)
EST. AVERAGE GLUCOSE BLD GHB EST-MCNC: 157.1 MG/DL
GFR SERPLBLD CREATININE-BSD FMLA CKD-EPI: 60 ML/MIN/{1.73_M2}
GLUCOSE SERPL-MCNC: 137 MG/DL (ref 70–99)
HBA1C MFR BLD: 7.1 %
HDLC SERPL-MCNC: 53 MG/DL (ref 40–60)
LDLC SERPL CALC-MCNC: 56 MG/DL
MAGNESIUM SERPL-MCNC: 1.65 MG/DL (ref 1.8–2.4)
MICROALBUMIN UR DL<=1MG/L-MCNC: 1.47 MG/DL
MICROALBUMIN/CREAT UR: 4.3 MG/G (ref 0–30)
POTASSIUM SERPL-SCNC: 4.1 MMOL/L (ref 3.5–5.1)
PROT SERPL-MCNC: 6.5 G/DL (ref 6.4–8.2)
SODIUM SERPL-SCNC: 141 MMOL/L (ref 136–145)
TRIGL SERPL-MCNC: 114 MG/DL (ref 0–150)
VLDLC SERPL CALC-MCNC: 23 MG/DL

## 2025-02-25 PROCEDURE — 99214 OFFICE O/P EST MOD 30 MIN: CPT | Performed by: INTERNAL MEDICINE

## 2025-02-25 PROCEDURE — G2211 COMPLEX E/M VISIT ADD ON: HCPCS | Performed by: INTERNAL MEDICINE

## 2025-02-25 PROCEDURE — 1159F MED LIST DOCD IN RCRD: CPT | Performed by: INTERNAL MEDICINE

## 2025-02-25 PROCEDURE — 3078F DIAST BP <80 MM HG: CPT | Performed by: INTERNAL MEDICINE

## 2025-02-25 PROCEDURE — 3074F SYST BP LT 130 MM HG: CPT | Performed by: INTERNAL MEDICINE

## 2025-02-25 PROCEDURE — 1123F ACP DISCUSS/DSCN MKR DOCD: CPT | Performed by: INTERNAL MEDICINE

## 2025-02-25 PROCEDURE — 3051F HG A1C>EQUAL 7.0%<8.0%: CPT | Performed by: INTERNAL MEDICINE

## 2025-02-25 RX ORDER — HYDROCODONE BITARTRATE AND ACETAMINOPHEN 5; 325 MG/1; MG/1
TABLET ORAL
COMMUNITY
Start: 2025-02-21

## 2025-02-25 RX ORDER — TESTOSTERONE CYPIONATE 200 MG/ML
INJECTION, SOLUTION INTRAMUSCULAR
COMMUNITY
Start: 2025-02-12

## 2025-02-25 RX ORDER — DIAZEPAM 5 MG/1
TABLET ORAL
COMMUNITY
Start: 2025-02-21

## 2025-02-25 RX ORDER — INSULIN GLARGINE 100 [IU]/ML
28 INJECTION, SOLUTION SUBCUTANEOUS 2 TIMES DAILY
Qty: 60 ML | Refills: 5 | Status: SHIPPED | OUTPATIENT
Start: 2025-02-25

## 2025-02-25 NOTE — PROGRESS NOTES
BUN 09/19/2024 26 (H)  7 - 20 mg/dL Final    Creatinine 09/19/2024 1.3  0.8 - 1.3 mg/dL Final    Est, Glom Filt Rate 09/19/2024 61  >60 Final    Calcium 09/19/2024 10.1  8.3 - 10.6 mg/dL Final    Total Protein 09/19/2024 7.2  6.4 - 8.2 g/dL Final    Albumin 09/19/2024 4.6  3.4 - 5.0 g/dL Final    Albumin/Globulin Ratio 09/19/2024 1.8  1.1 - 2.2 Final    Total Bilirubin 09/19/2024 0.5  0.0 - 1.0 mg/dL Final    Alkaline Phosphatase 09/19/2024 52  40 - 129 U/L Final    ALT 09/19/2024 17  10 - 40 U/L Final    AST 09/19/2024 16  15 - 37 U/L Final    WBC 09/19/2024 12.1 (H)  4.0 - 11.0 K/uL Final    RBC 09/19/2024 4.75  4.20 - 5.90 M/uL Final    Hemoglobin 09/19/2024 14.0  13.5 - 17.5 g/dL Final    Hematocrit 09/19/2024 41.7  40.5 - 52.5 % Final    MCV 09/19/2024 87.7  80.0 - 100.0 fL Final    MCH 09/19/2024 29.5  26.0 - 34.0 pg Final    MCHC 09/19/2024 33.6  31.0 - 36.0 g/dL Final    RDW 09/19/2024 14.0  12.4 - 15.4 % Final    Platelets 09/19/2024 239  135 - 450 K/uL Final    MPV 09/19/2024 10.1  5.0 - 10.5 fL Final    Neutrophils % 09/19/2024 72.9  % Final    Lymphocytes % 09/19/2024 19.7  % Final    Monocytes % 09/19/2024 6.7  % Final    Eosinophils % 09/19/2024 0.3  % Final    Basophils % 09/19/2024 0.4  % Final    Neutrophils Absolute 09/19/2024 8.8 (H)  1.7 - 7.7 K/uL Final    Lymphocytes Absolute 09/19/2024 2.4  1.0 - 5.1 K/uL Final    Monocytes Absolute 09/19/2024 0.8  0.0 - 1.3 K/uL Final    Eosinophils Absolute 09/19/2024 0.0  0.0 - 0.6 K/uL Final    Basophils Absolute 09/19/2024 0.1  0.0 - 0.2 K/uL Final   Orders Only on 09/10/2024   Component Date Value Ref Range Status    PSA 09/10/2024 1.22  0.00 - 4.00 ng/mL Final    Testosterone 09/10/2024 216  175 - 781 ng/dL Final           Assessment and Plan     1. Type 2 diabetes mellitus without complication, with long-term current use of insulin (HCC)  - The target A1c for this patient is <7%, given the age and PMH, provided this can be achieved with no significant

## 2025-02-28 LAB — ECHO BSA: 2.18 M2

## 2025-03-03 ENCOUNTER — TELEPHONE (OUTPATIENT)
Dept: CARDIOLOGY CLINIC | Age: 65
End: 2025-03-03

## 2025-03-03 NOTE — TELEPHONE ENCOUNTER
----- Message from Dr. Saul Skinner MD sent at 3/3/2025 11:41 AM EST -----  The monitor shows arrhythmia which will require formal electrophysiology consultation.    The patient should continue current medication until seen by EP.

## 2025-03-03 NOTE — TELEPHONE ENCOUNTER
Called and spoke with patient. Relayed VSP monitor results. Please contact patient to schedule EP appt next available EP provider for NSVT and brief atrial runs.

## 2025-03-17 ENCOUNTER — TELEPHONE (OUTPATIENT)
Dept: CARDIOLOGY CLINIC | Age: 65
End: 2025-03-17

## 2025-03-17 NOTE — TELEPHONE ENCOUNTER
Letter created sent through Epic. Called and notified patient. Letter also faxed as directed. Attempted to call Annette no answer.

## 2025-03-17 NOTE — TELEPHONE ENCOUNTER
Annette from Dr Landon office called asking for statis update. Annette was advise [facility] was covering for VSP and gave the pt clearance. Annette asked for letter to be faxed.

## 2025-03-17 NOTE — TELEPHONE ENCOUNTER
[facility] st[facility] they need to know if pt cleared soon. Or they will have to CX. Please call [phone]

## 2025-03-17 NOTE — TELEPHONE ENCOUNTER
CARDIAC CLEARANCE REQUEST    What type of procedure are you having:  colonoscopy  Are you taking any blood thinners:  Asprin 81mg was held 5 days  Type on anesthesia:    When is your procedure scheduled for:  3/18/2025  What physician is performing your procedure:  Dr. Landon with [facility]  Phone Number:   [phone]   Fax number to send the letter:  [phone]

## 2025-03-25 DIAGNOSIS — F41.9 ANXIETY: Primary | ICD-10-CM

## 2025-03-25 NOTE — TELEPHONE ENCOUNTER
Patient scheduled     Future Appointments   Date Time Provider Department Center   3/27/2025  2:30 PM Rikik Waddell DO [facility] DEP   5/9/2025  9:30 AM Vandana Gonsalves DO [facility]   5/12/2025  8:30 AM Rikki Waddell DO [facility] DEP   7/8/2025 11:00 AM Anne Harrington MD AND [facility]

## 2025-03-25 NOTE — TELEPHONE ENCOUNTER
Refill Request - Controlled Substance    CONFIRM preferred pharmacy with the patient.    If Mail Order Rx - Pend for 90 day refill.        Last Seen Department: 11/11/2024  Last Seen by PCP: 9/19/2024    Last Written: 2/21/25 #60 - 0 refills     Last UDS: 12/19/17    Med Agreement Signed On: 11/15/22    If no future appointment scheduled:  Review the last OV with PCP and review information for follow-up visit,  Route STAFF MESSAGE with patient name to the  Pool for scheduling with the following information:            -  Timing of next visit           -  Visit type ie Physical, OV, etc           -  Diagnoses/Reason ie. COPD, HTN - Do not use MEDICATION, Follow-up or CHECK UP - Give reason for visit        Next Appointment:   Future Appointments   Date Time Provider Department Center   3/27/2025  2:30 PM Rikki Waddell DO [facility]   5/9/2025  9:30 AM Vandana Gonsalves DO [facility]   5/12/2025  8:30 AM Rikki Waddell DO [facility]   7/8/2025 11:00 AM Anne Harrington MD [facility]       Message sent to  to schedule appt with patient?  NO      Requested Prescriptions     Pending Prescriptions Disp Refills    diazePAM (VALIUM) 5 MG tablet 60 tablet 0     Sig: Take 1 tablet by mouth every 12 hours as needed for Anxiety for up to 30 days. Max Daily Amount: 10 mg

## 2025-03-27 RX ORDER — DIAZEPAM 5 MG/1
5 TABLET ORAL EVERY 12 HOURS PRN
Qty: 60 TABLET | Refills: 0 | Status: SHIPPED | OUTPATIENT
Start: 2025-03-27 | End: 2025-04-26

## 2025-03-31 RX ORDER — ATORVASTATIN CALCIUM 40 MG/1
40 TABLET, FILM COATED ORAL DAILY
Qty: 90 TABLET | Refills: 0 | Status: SHIPPED | OUTPATIENT
Start: 2025-03-31

## 2025-03-31 NOTE — TELEPHONE ENCOUNTER
Refill Request     CONFIRM preferred pharmacy with the patient.    If Mail Order Rx - Pend for 90 day refill.      Last Seen: Last Seen Department: 11/11/2024  Last Seen by PCP: 9/19/2024    Last Written: 12/30/24 90 with no refills     If no future appointment scheduled:  Review the last OV with PCP and review information for follow-up visit,  Route STAFF MESSAGE with patient name to the  Pool for scheduling with the following information:            -  Timing of next visit           -  Visit type ie Physical, OV, etc           -  Diagnoses/Reason ie. COPD, HTN - Do not use MEDICATION, Follow-up or CHECK UP - Give reason for visit      Next Appointment:   Future Appointments   Date Time Provider Department Center   5/9/2025  9:30 AM Vandana Gonsalves DO [facility]   5/12/2025  8:30 AM Rikki Waddell DO [facility]   7/8/2025 11:00 AM Anne Harrington MD AND [facility]       Message sent to  to schedule appt with patient?  NO      Requested Prescriptions     Pending Prescriptions Disp Refills    atorvastatin (LIPITOR) 40 MG tablet [Pharmacy Med Name: ATORVASTATIN 40 MG TABLET] 90 tablet 0     Sig: TAKE 1 TABLET BY MOUTH EVERY DAY

## 2025-04-14 DIAGNOSIS — E11.9 TYPE 2 DIABETES MELLITUS WITHOUT COMPLICATION, WITH LONG-TERM CURRENT USE OF INSULIN: ICD-10-CM

## 2025-04-14 DIAGNOSIS — Z79.4 TYPE 2 DIABETES MELLITUS WITHOUT COMPLICATION, WITH LONG-TERM CURRENT USE OF INSULIN: ICD-10-CM

## 2025-04-14 NOTE — TELEPHONE ENCOUNTER
Refill Request     CONFIRM preferred pharmacy with the patient.    If Mail Order Rx - Pend for 90 day refill.      Last Seen: Last Seen Department: 11/11/2024  Last Seen by PCP: 9/19/2024    Last Written: discontinued by Dr. Blackmon 10/9/24     If no future appointment scheduled:  Review the last OV with PCP and review information for follow-up visit,  Route STAFF MESSAGE with patient name to the  Pool for scheduling with the following information:            -  Timing of next visit           -  Visit type ie Physical, OV, etc           -  Diagnoses/Reason ie. COPD, HTN - Do not use MEDICATION, Follow-up or CHECK UP - Give reason for visit      Next Appointment:   Future Appointments   Date Time Provider Department Center   5/9/2025  9:30 AM Vandana Gonsalves DO [facility]   5/12/2025  8:30 AM Rikki Waddell DO [facility]   7/8/2025 11:00 AM Anne Harrington MD AND [facility]       Message sent to  to schedule appt with patient?  NO      Requested Prescriptions     Pending Prescriptions Disp Refills    metFORMIN (GLUCOPHAGE) 500 MG tablet [Pharmacy Med Name: METFORMIN  MG TABLET] 360 tablet 1     Sig: TAKE 2 TABLETS BY MOUTH TWICE A DAY WITH MEALS

## 2025-04-15 NOTE — TELEPHONE ENCOUNTER
He did have metformin in the past but had subsequent hyperglycemia therefore he did restart.  Will refill metformin.

## 2025-04-28 DIAGNOSIS — F41.9 ANXIETY: ICD-10-CM

## 2025-04-28 NOTE — TELEPHONE ENCOUNTER
Refill Request - Controlled Substance    CONFIRM preferred pharmacy with the patient.    If Mail Order Rx - Pend for 90 day refill.        Last Seen Department: 11/11/2024  Last Seen by PCP: 9/19/2024    Last Written: 03.27.25     Last UDS: none    Med Agreement Signed On: 11.15.22    If no future appointment scheduled:  Review the last OV with PCP and review information for follow-up visit,  Route STAFF MESSAGE with patient name to the  Pool for scheduling with the following information:            -  Timing of next visit           -  Visit type ie Physical, OV, etc           -  Diagnoses/Reason ie. COPD, HTN - Do not use MEDICATION, Follow-up or CHECK UP - Give reason for visit        Next Appointment:   Future Appointments   Date Time Provider Department Center   5/9/2025  9:30 AM Vandana Gonsalves DO [facility]   5/12/2025  8:30 AM Rikki Waddell DO [facility]   7/8/2025 11:00 AM Anne Harrington MD AND [facility]       Message sent to  to schedule appt with patient?  YES      Requested Prescriptions     Pending Prescriptions Disp Refills    diazePAM (VALIUM) 5 MG tablet [Pharmacy Med Name: DIAZEPAM 5 MG TABLET] 60 tablet 0     Sig: TAKE 1 TABLET BY MOUTH EVERY 12 HOURS AS NEEDED FOR ANXIETY FOR UP TO 30 DAYS. MAX DAILY 2 TABLETS

## 2025-04-29 RX ORDER — DIAZEPAM 5 MG/1
TABLET ORAL
Qty: 60 TABLET | Refills: 0 | Status: SHIPPED | OUTPATIENT
Start: 2025-04-29 | End: 2025-05-29

## 2025-05-05 ENCOUNTER — TELEPHONE (OUTPATIENT)
Dept: FAMILY MEDICINE CLINIC | Age: 65
End: 2025-05-05

## 2025-05-05 DIAGNOSIS — K86.89 PANCREATIC INSUFFICIENCY: Primary | ICD-10-CM

## 2025-05-05 NOTE — TELEPHONE ENCOUNTER
Patient calling asking if provider can place lab orders for today so he can be checked for pancreatitis. He would like his kidney function tested as well, and anything else provider might want to order.     Having multiple bowel movements and urination throughout the night. Has had pancreatitis in the past. 4-5 years ago. Stomach ache over 2 weeks now.     Patient is scheduled with provider for New patient appointment.    Future Appointments   Date Time Provider Department Center   5/9/2025  9:30 AM Vandana Gonsalves DO [facility]   5/12/2025  8:30 AM Rikki Waddell DO [facility]   7/8/2025 11:00 AM Anne Harrington MD [facility]       Please advise  Thank you

## 2025-05-06 NOTE — TELEPHONE ENCOUNTER
Those labs have been ordered.  However pancreatitis is not usually handled in the outpatient setting.  Typically the pain is severe, which is usually the case with pancreatitis, patient have to go to the hospital to get IV fluids and bowel rest.   Patient has a history of carcinoid tumor s/p bilateral partial nephrectomies. Patient has a history of hydronephrosis for which he underwent ureteral stent placement. Patient is may possibly undergo a percuatenous nephrostomy by IR today. 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 Patient has a history of hydronephrosis for which he underwent ureteral stent placement. Patient is may possibly undergo a percuatenous nephrostomy by IR

## 2025-05-06 NOTE — TELEPHONE ENCOUNTER
Patient notified of test results and to call the office with any further questions, verbally states he understands.

## 2025-05-07 DIAGNOSIS — K86.89 PANCREATIC INSUFFICIENCY: ICD-10-CM

## 2025-05-08 ENCOUNTER — RESULTS FOLLOW-UP (OUTPATIENT)
Dept: FAMILY MEDICINE CLINIC | Age: 65
End: 2025-05-08

## 2025-05-08 LAB
ALBUMIN SERPL-MCNC: 4.3 G/DL (ref 3.4–5)
ALBUMIN/GLOB SERPL: 1.7 {RATIO} (ref 1.1–2.2)
ALP SERPL-CCNC: 62 U/L (ref 40–129)
ALT SERPL-CCNC: 20 U/L (ref 10–40)
ANION GAP SERPL CALCULATED.3IONS-SCNC: 10 MMOL/L (ref 3–16)
AST SERPL-CCNC: 17 U/L (ref 15–37)
BASOPHILS # BLD: 0.1 K/UL (ref 0–0.2)
BASOPHILS NFR BLD: 0.6 %
BILIRUB SERPL-MCNC: 0.3 MG/DL (ref 0–1)
BUN SERPL-MCNC: 26 MG/DL (ref 7–20)
CALCIUM SERPL-MCNC: 10 MG/DL (ref 8.3–10.6)
CHLORIDE SERPL-SCNC: 99 MMOL/L (ref 99–110)
CO2 SERPL-SCNC: 27 MMOL/L (ref 21–32)
CREAT SERPL-MCNC: 1.4 MG/DL (ref 0.8–1.3)
DEPRECATED RDW RBC AUTO: 15.3 % (ref 12.4–15.4)
EOSINOPHIL # BLD: 0.1 K/UL (ref 0–0.6)
EOSINOPHIL NFR BLD: 0.4 %
GFR SERPLBLD CREATININE-BSD FMLA CKD-EPI: 55 ML/MIN/{1.73_M2}
GLUCOSE SERPL-MCNC: 99 MG/DL (ref 70–99)
HCT VFR BLD AUTO: 41.6 % (ref 40.5–52.5)
HGB BLD-MCNC: 13.6 G/DL (ref 13.5–17.5)
LIPASE SERPL-CCNC: 9 U/L (ref 13–60)
LYMPHOCYTES # BLD: 3.4 K/UL (ref 1–5.1)
LYMPHOCYTES NFR BLD: 26.5 %
MCH RBC QN AUTO: 26 PG (ref 26–34)
MCHC RBC AUTO-ENTMCNC: 32.6 G/DL (ref 31–36)
MCV RBC AUTO: 79.7 FL (ref 80–100)
MONOCYTES # BLD: 1.1 K/UL (ref 0–1.3)
MONOCYTES NFR BLD: 8.3 %
NEUTROPHILS # BLD: 8.2 K/UL (ref 1.7–7.7)
NEUTROPHILS NFR BLD: 64.2 %
PLATELET # BLD AUTO: 267 K/UL (ref 135–450)
PMV BLD AUTO: 9.4 FL (ref 5–10.5)
POTASSIUM SERPL-SCNC: 4.2 MMOL/L (ref 3.5–5.1)
PROT SERPL-MCNC: 6.8 G/DL (ref 6.4–8.2)
RBC # BLD AUTO: 5.22 M/UL (ref 4.2–5.9)
SODIUM SERPL-SCNC: 136 MMOL/L (ref 136–145)
WBC # BLD AUTO: 12.8 K/UL (ref 4–11)

## 2025-05-08 NOTE — PROGRESS NOTES
CARDIAC ELECTROPHYSIOLOGY CONSULT NOTE  Date: 5/7/25  Patient Name: Zafar Chong  YOB: 1960    Primary Care Physician: No primary care provider on file.    Referring Physician: Dr. Skinner    CHIEF COMPLAINT: No chief complaint on file.    Zafar Chong was seen today on 5/7/2025 in consultation due to chief complaint of pSVT/NSVT.    Patient is a 64 y.o. male with PMH of CAD s/p cardiac catheterization 4/2015 which showed mild non-obstructive, HTN, HLD, CVA and DM who was seen today for pSVT/NSVT. Patient also follows with my cardiology partner, Dr. Skinner.     He was admitted to the hospital 2/12/20 for left facial numbness. CT of head and CTA of head and neck were negative. MRI of the brain was unremarkable. He returned to the ED for evaluation 2/18/20 complaint of chest pain and shortness of breath. Chest xray showed no acute abnormality. CT chest showed no evidence for pulmonary emboli. He had a Lexiscan stress myoview completed 08/05/2022 small to moderate size reversible defect noted, abnormal study however consistent with false positive     In 2/2025 patient called into cardiology office with complaints of palpitations. Patient wore a cardiac event monitor from 2/7/2025 to 2/21/2025 which demonstrated predominately SR with an average HR of 90 () BPM, pSVT (longest 4 beats in duration), NSVT (longest 5 beats in duration), PAC burden 0.03%, PVC burden 0.29%.     Today, 5/9/2025, ***     ALLERGIES:   Allergies   Allergen Reactions    Codeine Hives and Nausea Only     25 years ago-unsure if allergy stated    Dilaudid [Hydromorphone Hcl]     Fentanyl      Patch caused skin irritation. IV does not bother pt has had fentanyl IV before.     Toradol [Ketorolac Tromethamine]         MEDICATIONS:   Current Outpatient Medications   Medication Sig Dispense Refill    diazePAM (VALIUM) 5 MG tablet TAKE 1 TABLET BY MOUTH EVERY 12 HOURS AS NEEDED FOR ANXIETY FOR UP TO 30 DAYS. MAX DAILY 2 TABLETS 60 
filling parameters suggests grade I diastolic dysfunction .       IMPRESSIONS:  1. NSVT (nonsustained ventricular tachycardia) (HCC)    2. SVT (supraventricular tachycardia)    3. Stroke-like symptoms    4. Right bundle branch block    5. TIA (transient ischemic attack)    6. Coronary artery disease due to lipid rich plaque            PLAN:   -Start coreg 12.5 mg twice daily for NSVT and HTN.   - Discussed risks and benefits of implantable loop recorder for history of TIA. We will call you to schedule this.   -Follow up with me in 6 months.     Thank you for allowing me to participate in the care of this patient. If you have any further questions please feel free to contact me.      This note has been scribed in the presence of Vandana Gonsalves, by Edwige Eldridge RN.       PHYSICIAN ADDENDUM:  5/9/2025    EP attending addendum:  I, Dr. Vandana Gonsalves, DO, personally performed the services described in this documentation as scribed by  RN in my presence, and it is both accurate and complete.      Patient is a 66-year-old male with past medical history of mild nonobstructive coronary artery disease, hypertension, dyslipidemia, TIA, ulcerative colitis, diabetes mellitus who is being seen for PSVT, nonsustained VT, and possible strokelike symptoms.  Patient was admitted to the hospital in February 2020 with symptoms as described above.  There was no definitive diagnosis of stroke, but TIA was likely possibility.    Patient has not had any diagnosis of atrial fibrillation or flutter.  However, there are episodes of nonsustained VT and PSVT on his event monitor with symptoms of palpitations and lightheadedness.    We discussed a loop recorder implantation. Risks, benefits, specifics, pre and post-operative care of the procedure including follow up and precautions was discussed with the patient in detail who verbalized understanding on the procedure. All questions were addressed.  Patient would like to proceed with loop

## 2025-05-09 ENCOUNTER — OFFICE VISIT (OUTPATIENT)
Dept: CARDIOLOGY CLINIC | Age: 65
End: 2025-05-09
Payer: MEDICARE

## 2025-05-09 VITALS
WEIGHT: 204.59 LBS | HEIGHT: 72 IN | BODY MASS INDEX: 27.71 KG/M2 | OXYGEN SATURATION: 98 % | SYSTOLIC BLOOD PRESSURE: 152 MMHG | HEART RATE: 72 BPM | DIASTOLIC BLOOD PRESSURE: 80 MMHG

## 2025-05-09 DIAGNOSIS — I47.29 NSVT (NONSUSTAINED VENTRICULAR TACHYCARDIA) (HCC): Primary | ICD-10-CM

## 2025-05-09 DIAGNOSIS — I25.83 CORONARY ARTERY DISEASE DUE TO LIPID RICH PLAQUE: ICD-10-CM

## 2025-05-09 DIAGNOSIS — I25.10 CORONARY ARTERY DISEASE DUE TO LIPID RICH PLAQUE: ICD-10-CM

## 2025-05-09 DIAGNOSIS — I45.10 RIGHT BUNDLE BRANCH BLOCK: ICD-10-CM

## 2025-05-09 DIAGNOSIS — R29.90 STROKE-LIKE SYMPTOMS: ICD-10-CM

## 2025-05-09 DIAGNOSIS — I47.10 SVT (SUPRAVENTRICULAR TACHYCARDIA): ICD-10-CM

## 2025-05-09 DIAGNOSIS — G45.9 TIA (TRANSIENT ISCHEMIC ATTACK): ICD-10-CM

## 2025-05-09 PROCEDURE — 93000 ELECTROCARDIOGRAM COMPLETE: CPT | Performed by: INTERNAL MEDICINE

## 2025-05-09 PROCEDURE — 99204 OFFICE O/P NEW MOD 45 MIN: CPT | Performed by: INTERNAL MEDICINE

## 2025-05-09 PROCEDURE — 3079F DIAST BP 80-89 MM HG: CPT | Performed by: INTERNAL MEDICINE

## 2025-05-09 PROCEDURE — 3077F SYST BP >= 140 MM HG: CPT | Performed by: INTERNAL MEDICINE

## 2025-05-09 PROCEDURE — 1123F ACP DISCUSS/DSCN MKR DOCD: CPT | Performed by: INTERNAL MEDICINE

## 2025-05-09 PROCEDURE — 1159F MED LIST DOCD IN RCRD: CPT | Performed by: INTERNAL MEDICINE

## 2025-05-09 RX ORDER — CARVEDILOL 12.5 MG/1
12.5 TABLET ORAL 2 TIMES DAILY
Qty: 180 TABLET | Refills: 1 | Status: SHIPPED | OUTPATIENT
Start: 2025-05-09

## 2025-05-09 SDOH — HEALTH STABILITY: PHYSICAL HEALTH: ON AVERAGE, HOW MANY DAYS PER WEEK DO YOU ENGAGE IN MODERATE TO STRENUOUS EXERCISE (LIKE A BRISK WALK)?: 7 DAYS

## 2025-05-09 SDOH — HEALTH STABILITY: PHYSICAL HEALTH: ON AVERAGE, HOW MANY MINUTES DO YOU ENGAGE IN EXERCISE AT THIS LEVEL?: 60 MIN

## 2025-05-09 NOTE — PATIENT INSTRUCTIONS
PLAN:   -Start coreg 12.5 mg twice daily for NSVT and HTN.   - Discussed risks and benefits of implantable loop recorder for history of TIA. We will call you to schedule this.   -Follow up with me in 6 months.

## 2025-05-12 ENCOUNTER — TELEPHONE (OUTPATIENT)
Dept: PULMONOLOGY | Age: 65
End: 2025-05-12

## 2025-05-12 ENCOUNTER — OFFICE VISIT (OUTPATIENT)
Dept: FAMILY MEDICINE CLINIC | Age: 65
End: 2025-05-12
Payer: MEDICARE

## 2025-05-12 ENCOUNTER — TELEPHONE (OUTPATIENT)
Dept: CARDIOLOGY CLINIC | Age: 65
End: 2025-05-12

## 2025-05-12 VITALS
BODY MASS INDEX: 27.22 KG/M2 | OXYGEN SATURATION: 98 % | HEIGHT: 72 IN | HEART RATE: 60 BPM | RESPIRATION RATE: 18 BRPM | WEIGHT: 201 LBS | SYSTOLIC BLOOD PRESSURE: 110 MMHG | DIASTOLIC BLOOD PRESSURE: 60 MMHG

## 2025-05-12 VITALS
OXYGEN SATURATION: 98 % | HEIGHT: 72 IN | HEART RATE: 60 BPM | DIASTOLIC BLOOD PRESSURE: 60 MMHG | BODY MASS INDEX: 27.22 KG/M2 | WEIGHT: 201 LBS | SYSTOLIC BLOOD PRESSURE: 110 MMHG

## 2025-05-12 DIAGNOSIS — K51.90 ULCERATIVE COLITIS WITHOUT COMPLICATIONS, UNSPECIFIED LOCATION (HCC): ICD-10-CM

## 2025-05-12 DIAGNOSIS — E78.2 MIXED HYPERLIPIDEMIA: ICD-10-CM

## 2025-05-12 DIAGNOSIS — E11.22 TYPE 2 DIABETES MELLITUS WITH STAGE 3A CHRONIC KIDNEY DISEASE, WITH LONG-TERM CURRENT USE OF INSULIN (HCC): Primary | ICD-10-CM

## 2025-05-12 DIAGNOSIS — G45.9 TIA (TRANSIENT ISCHEMIC ATTACK): Primary | ICD-10-CM

## 2025-05-12 DIAGNOSIS — I47.10 SVT (SUPRAVENTRICULAR TACHYCARDIA): ICD-10-CM

## 2025-05-12 DIAGNOSIS — F51.02 ADJUSTMENT INSOMNIA: ICD-10-CM

## 2025-05-12 DIAGNOSIS — Z79.4 TYPE 2 DIABETES MELLITUS WITH STAGE 3A CHRONIC KIDNEY DISEASE, WITH LONG-TERM CURRENT USE OF INSULIN (HCC): Primary | ICD-10-CM

## 2025-05-12 DIAGNOSIS — Z00.00 MEDICARE ANNUAL WELLNESS VISIT, SUBSEQUENT: Primary | ICD-10-CM

## 2025-05-12 DIAGNOSIS — I10 ESSENTIAL HYPERTENSION: ICD-10-CM

## 2025-05-12 DIAGNOSIS — K50.119 CROHN'S DISEASE OF LARGE INTESTINE WITH COMPLICATION (HCC): ICD-10-CM

## 2025-05-12 DIAGNOSIS — K86.1 IDIOPATHIC CHRONIC PANCREATITIS (HCC): ICD-10-CM

## 2025-05-12 DIAGNOSIS — N18.31 TYPE 2 DIABETES MELLITUS WITH STAGE 3A CHRONIC KIDNEY DISEASE, WITH LONG-TERM CURRENT USE OF INSULIN (HCC): Primary | ICD-10-CM

## 2025-05-12 DIAGNOSIS — I47.29 NSVT (NONSUSTAINED VENTRICULAR TACHYCARDIA) (HCC): ICD-10-CM

## 2025-05-12 LAB — HBA1C MFR BLD: 7 %

## 2025-05-12 PROCEDURE — 83036 HEMOGLOBIN GLYCOSYLATED A1C: CPT | Performed by: FAMILY MEDICINE

## 2025-05-12 PROCEDURE — 1123F ACP DISCUSS/DSCN MKR DOCD: CPT | Performed by: FAMILY MEDICINE

## 2025-05-12 PROCEDURE — 1159F MED LIST DOCD IN RCRD: CPT | Performed by: FAMILY MEDICINE

## 2025-05-12 PROCEDURE — G2211 COMPLEX E/M VISIT ADD ON: HCPCS | Performed by: FAMILY MEDICINE

## 2025-05-12 PROCEDURE — 3074F SYST BP LT 130 MM HG: CPT | Performed by: FAMILY MEDICINE

## 2025-05-12 PROCEDURE — G0439 PPPS, SUBSEQ VISIT: HCPCS | Performed by: FAMILY MEDICINE

## 2025-05-12 PROCEDURE — 3078F DIAST BP <80 MM HG: CPT | Performed by: FAMILY MEDICINE

## 2025-05-12 PROCEDURE — 3051F HG A1C>EQUAL 7.0%<8.0%: CPT | Performed by: FAMILY MEDICINE

## 2025-05-12 PROCEDURE — 99214 OFFICE O/P EST MOD 30 MIN: CPT | Performed by: FAMILY MEDICINE

## 2025-05-12 RX ORDER — SYRINGE WITH NEEDLE, 1 ML 25GX5/8"
SYRINGE, EMPTY DISPOSABLE MISCELLANEOUS
COMMUNITY
Start: 2025-03-06

## 2025-05-12 RX ORDER — HYDROCODONE BITARTRATE AND ACETAMINOPHEN 5; 325 MG/1; MG/1
1 TABLET ORAL EVERY 4 HOURS PRN
Qty: 18 TABLET | Refills: 0 | Status: SHIPPED | OUTPATIENT
Start: 2025-05-12 | End: 2025-05-15

## 2025-05-12 RX ORDER — SYRINGE WITH NEEDLE, 1 ML 25GX5/8"
SYRINGE, EMPTY DISPOSABLE MISCELLANEOUS
COMMUNITY
Start: 2025-03-12

## 2025-05-12 RX ORDER — TEMAZEPAM 15 MG/1
CAPSULE ORAL
COMMUNITY
Start: 2025-04-28

## 2025-05-12 RX ORDER — MELOXICAM 15 MG/1
15 TABLET ORAL DAILY
COMMUNITY
Start: 2025-03-28

## 2025-05-12 RX ORDER — LEVOFLOXACIN 500 MG/1
500 TABLET, FILM COATED ORAL DAILY
COMMUNITY
Start: 2025-03-28 | End: 2025-05-12 | Stop reason: ALTCHOICE

## 2025-05-12 SDOH — ECONOMIC STABILITY: FOOD INSECURITY: WITHIN THE PAST 12 MONTHS, YOU WORRIED THAT YOUR FOOD WOULD RUN OUT BEFORE YOU GOT MONEY TO BUY MORE.: NEVER TRUE

## 2025-05-12 SDOH — ECONOMIC STABILITY: FOOD INSECURITY: WITHIN THE PAST 12 MONTHS, THE FOOD YOU BOUGHT JUST DIDN'T LAST AND YOU DIDN'T HAVE MONEY TO GET MORE.: NEVER TRUE

## 2025-05-12 ASSESSMENT — LIFESTYLE VARIABLES
HOW MANY STANDARD DRINKS CONTAINING ALCOHOL DO YOU HAVE ON A TYPICAL DAY: PATIENT DOES NOT DRINK
HOW OFTEN DO YOU HAVE A DRINK CONTAINING ALCOHOL: NEVER

## 2025-05-12 ASSESSMENT — PATIENT HEALTH QUESTIONNAIRE - PHQ9: DEPRESSION UNABLE TO ASSESS: PT REFUSES

## 2025-05-12 NOTE — PATIENT INSTRUCTIONS
cholesterol. If you think you may have a problem with alcohol or drug use, talk to your doctor.   Medicines    Take your medicines exactly as prescribed. Call your doctor if you think you are having a problem with your medicine.     If your doctor recommends aspirin, take the amount directed each day. Make sure you take aspirin and not another kind of pain reliever, such as acetaminophen (Tylenol).   When should you call for help?   Call 911 if you have symptoms of a heart attack. These may include:    Chest pain or pressure, or a strange feeling in the chest.     Sweating.     Shortness of breath.     Pain, pressure, or a strange feeling in the back, neck, jaw, or upper belly or in one or both shoulders or arms.     Lightheadedness or sudden weakness.     A fast or irregular heartbeat.   After you call 911, the  may tell you to chew 1 adult-strength or 2 to 4 low-dose aspirin. Wait for an ambulance. Do not try to drive yourself.  Watch closely for changes in your health, and be sure to contact your doctor if you have any problems.  Where can you learn more?  Go to https://www.[employer].net/patientEd and enter F075 to learn more about \"A Healthy Heart: Care Instructions.\"  Current as of: July 31, 2024  Content Version: 14.4  © 7832-2215 [employer].   Care instructions adapted under license by [employer]. If you have questions about a medical condition or this instruction, always ask your healthcare professional. [employer] disclaims any warranty or liability for your use of this information.    Personalized Preventive Plan for Zafar Chong - 5/12/2025  Medicare offers a range of preventive health benefits. Some of the tests and screenings are paid in full while other may be subject to a deductible, co-insurance, and/or copay.  Some of these benefits include a comprehensive review of your medical history including lifestyle, illnesses that may run in your family, and various

## 2025-05-12 NOTE — PROGRESS NOTES
Zafar Chong is a 64 y.o. male    Chief Complaint   Patient presents with    Diabetes    Hypertension    Hyperlipidemia       HPI:    This is a new patient to me.     Diabetes  He presents for his follow-up diabetic visit. He has type 2 diabetes mellitus. His disease course has been stable. Risk factors for coronary artery disease include male sex, diabetes mellitus, hypertension and obesity. Current diabetic treatment includes intensive insulin program. An ACE inhibitor/angiotensin II receptor blocker is being taken.   Hypertension  This is a chronic problem. The current episode started more than 1 year ago. The problem is unchanged. The problem is controlled. Risk factors for coronary artery disease include obesity, diabetes mellitus and male gender. Past treatments include angiotensin blockers and diuretics. The current treatment provides significant improvement. There is no history of kidney disease.   Hyperlipidemia  This is a chronic problem. The current episode started more than 1 year ago. The problem is controlled. Recent lipid tests were reviewed and are normal. Current antihyperlipidemic treatment includes statins. The current treatment provides significant improvement of lipids. There are no compliance problems.  Risk factors for coronary artery disease include male sex, diabetes mellitus and obesity.     History of idiopathic chronic pancreatitis.  The patient has chronic lower abdominal pain but GI does not know what the causes.  He is requesting a referral to pain management.    Insomnia.  This is a chronic issue.  The patient has trouble going to sleep.  He has no trouble staying asleep.  He usually only sleeps about 1 to 2 hours at night.  He has failed multiple medications.    ROS:    Review of Systems   Psychiatric/Behavioral:  Positive for sleep disturbance.        /60 (BP Site: Right Upper Arm, Patient Position: Sitting, BP Cuff Size: Medium Adult)   Pulse 60   Ht 1.829 m (6' 0.01\")

## 2025-05-12 NOTE — TELEPHONE ENCOUNTER
CASE REQUEST CREATED for LUCY mac/ ROLANDO    No meds to hold, doesn't need to be NPO, local only.

## 2025-05-12 NOTE — PROGRESS NOTES
Medicare Annual Wellness Visit    Zafar Chong is here for Medicare AWV    Assessment & Plan   Medicare annual wellness visit, subsequent     No follow-ups on file.     Subjective       Patient's complete Health Risk Assessment and screening values have been reviewed and are found in Flowsheets. The following problems were reviewed today and where indicated follow up appointments were made and/or referrals ordered.    Positive Risk Factor Screenings with Interventions:    Fall Risk:  Do you feel unsteady or are you worried about falling? : no  2 or more falls in past year?: no  Fall with injury in past year?: (!) yes     Interventions:    Reviewed medications, home hazards, visual acuity, and co-morbidities that can increase risk for falls  See AVS for additional education material    Cognitive:   Clock Drawing Test (CDT): Normal  Words recalled: 0 Words Recalled (had tbi do to fall several years ago)  Total Score: (!) 2  Total Score Interpretation: Abnormal Mini-Cog  Interventions:  Patient declines any further evaluation or treatment    Depression:             Controlled Medication Review:    Today's Pain Level: No data recorded   Opioid Risk: (Low risk score <55) Opioid risk score: 52    Patient is low risk for opioid use disorder or overdose.    Last PDMP Nilesh as Reviewed:  Review User Review Instant Review Result   JAMARIGLESIA IQBALMARCELINO HOBBS 5/12/2025  8:54 AM     Reviewed PDMP [1]     Last Controlled Substance Monitoring Documentation      Flowsheet Row Office Visit from 11/9/2022 in [facility]   Periodic Controlled Substance Monitoring No signs of potential drug abuse or diversion identified. filed at 11/09/2022 0496   Chronic Pain > 120 MEDD Obtained or confirmed \"Medication Contract\" on file. filed at 11/09/2022 1631              General HRA Questions:  Select all that apply: (!) New or Increased Pain  Interventions - Pain:  Was referred to pain management              Advanced

## 2025-05-14 ENCOUNTER — PATIENT MESSAGE (OUTPATIENT)
Dept: FAMILY MEDICINE CLINIC | Age: 65
End: 2025-05-14

## 2025-05-19 PROBLEM — I47.29 NSVT (NONSUSTAINED VENTRICULAR TACHYCARDIA) (HCC): Status: ACTIVE | Noted: 2025-05-12

## 2025-05-19 PROBLEM — G45.9 TIA (TRANSIENT ISCHEMIC ATTACK): Status: ACTIVE | Noted: 2025-05-12

## 2025-05-19 PROBLEM — I47.10 SVT (SUPRAVENTRICULAR TACHYCARDIA): Status: ACTIVE | Noted: 2025-05-12

## 2025-05-19 NOTE — TELEPHONE ENCOUNTER
Procedure:  Loop Implant  Doctor:  Dr. Gonsalves  Date:  6/16/25  Time:  1pm  Arrival:  12pm  Reps:  [employer]  Anesthesia:  n/a      Spoke with patient. Please have patient arrive to the main entrance of [facility] ([address] [place] [id number]) and check in with the registration desk.  They will be directed to the Cath Lab.  Do not apply lotions/creams on skin the day of procedure.

## 2025-05-27 NOTE — TELEPHONE ENCOUNTER
[facility]  [address]  [address]  [facility] [address]    Elizabeth Fernandes  [address]  [address]  [facility] [address] Loc/POS:                 Phone: [phone] Phone:     Fax: [phone]        Patient returned call and was given referral info to call and schedule.    Addded note to open referral in workque.

## 2025-05-28 NOTE — TELEPHONE ENCOUNTER
Pt called back for the information: was given information for Pain management in [facility] since that was closest for him, but was told to call insurance to see what places would be covered under his insurance company.

## 2025-05-29 ENCOUNTER — TELEPHONE (OUTPATIENT)
Dept: FAMILY MEDICINE CLINIC | Age: 65
End: 2025-05-29

## 2025-05-29 NOTE — TELEPHONE ENCOUNTER
Patient states he has called the pain management in [facility] and they do not answer. He has tried to schedule and made multiple calls they will not answer. Would like to know what you recommend.     States he is not sleeping because he is in so much pain. He wants to know how to control this pain until he can be seen by someone with pain management.     I did fax over the referral and OV notes to [place] [facility].

## 2025-05-30 DIAGNOSIS — F41.9 ANXIETY: ICD-10-CM

## 2025-06-02 ENCOUNTER — PATIENT MESSAGE (OUTPATIENT)
Dept: FAMILY MEDICINE CLINIC | Age: 65
End: 2025-06-02

## 2025-06-02 DIAGNOSIS — K51.90 ULCERATIVE COLITIS WITHOUT COMPLICATIONS, UNSPECIFIED LOCATION (HCC): ICD-10-CM

## 2025-06-02 DIAGNOSIS — F41.9 ANXIETY: Primary | ICD-10-CM

## 2025-06-02 DIAGNOSIS — F51.02 ADJUSTMENT INSOMNIA: ICD-10-CM

## 2025-06-02 RX ORDER — DIAZEPAM 5 MG/1
TABLET ORAL
Qty: 60 TABLET | Refills: 0 | Status: SHIPPED | OUTPATIENT
Start: 2025-06-02 | End: 2025-06-02

## 2025-06-02 RX ORDER — DIAZEPAM 10 MG/1
10 TABLET ORAL EVERY 12 HOURS PRN
Qty: 30 TABLET | Refills: 0 | Status: SHIPPED | OUTPATIENT
Start: 2025-06-02 | End: 2025-06-03 | Stop reason: SDUPTHER

## 2025-06-02 NOTE — TELEPHONE ENCOUNTER
Spoke with Karon at [facility].  Valium last picked up on 4/29.  Confirmed Temazepam was returned to the pharmacy about 2 weeks.    Please advise.

## 2025-06-02 NOTE — TELEPHONE ENCOUNTER
Refill Request - Controlled Substance    CONFIRM preferred pharmacy with the patient.    If Mail Order Rx - Pend for 90 day refill.        Last Seen Department: 5/12/2025  Last Seen by PCP: 5/12/2025    Last Written: 4/29/25    Last UDS: 12/19/17    Med Agreement Signed On: 11/15/22    If no future appointment scheduled:  Review the last OV with PCP and review information for follow-up visit,  Route STAFF MESSAGE with patient name to the  Pool for scheduling with the following information:            -  Timing of next visit           -  Visit type ie Physical, OV, etc           -  Diagnoses/Reason ie. COPD, HTN - Do not use MEDICATION, Follow-up or CHECK UP - Give reason for visit        Next Appointment:   Future Appointments   Date Time Provider Department Center   6/11/2025  9:40 AM Anne Harrington MD AND ENDO [facility]   7/16/2025 10:30 AM Malcolm Fam MD AND PULM [facility]   11/6/2025 10:00 AM Vandana Gonsalves DO [facility]   12/15/2025  8:30 AM Rikki Waddell DO [facility] DEP       Message sent to  to schedule appt with patient?  NO      Requested Prescriptions     Pending Prescriptions Disp Refills    diazePAM (VALIUM) 5 MG tablet [Pharmacy Med Name: DIAZEPAM 5 MG TABLET] 60 tablet 0     Sig: TAKE 1 TABLET BY MOUTH EVERY 12 HOURS AS NEEDED FOR ANXIETY FOR UP TO 30 DAYS

## 2025-06-03 ENCOUNTER — TELEPHONE (OUTPATIENT)
Dept: FAMILY MEDICINE CLINIC | Age: 65
End: 2025-06-03

## 2025-06-03 DIAGNOSIS — K51.90 ULCERATIVE COLITIS WITHOUT COMPLICATIONS, UNSPECIFIED LOCATION (HCC): ICD-10-CM

## 2025-06-03 DIAGNOSIS — F51.02 ADJUSTMENT INSOMNIA: ICD-10-CM

## 2025-06-03 DIAGNOSIS — F41.9 ANXIETY: ICD-10-CM

## 2025-06-03 RX ORDER — DIAZEPAM 10 MG/1
10 TABLET ORAL EVERY 12 HOURS PRN
Qty: 30 TABLET | Refills: 0 | Status: SHIPPED | OUTPATIENT
Start: 2025-06-03 | End: 2025-06-06 | Stop reason: ALTCHOICE

## 2025-06-03 NOTE — TELEPHONE ENCOUNTER
I personally called pharmacy to confirm. Only 30 tablets total sent which is half the month's prescription. Just sent over second script for patient for the other half- total 60 tablets; 10 mg every 12 hours for 30 days.

## 2025-06-03 NOTE — TELEPHONE ENCOUNTER
Patient called in stating that his medication of Diazepam was messed up. Stated that the pharmacy only gave him half of the monthly supply. The prescription states that he should take 1 10mg tablet every 12 hours, no more than 20mg per day.     Patient stated he was going to call the pharmacy and call us back if a new prescription was needed.

## 2025-06-03 NOTE — TELEPHONE ENCOUNTER
Patient called in and states that he did not want the appointment with [facility] with Alexis Vick, that he wants to see Dr Etienne out in [facility]. I have re faxed the referral and clinical over to them again so they can contact him to get this appt set up.

## 2025-06-03 NOTE — TELEPHONE ENCOUNTER
Patient called back only received 15 day supply from pharmacy which is 30 pills.  He states that  another 15 day supply needs to be sent in to make him 30 day supply..  only 15 days worth of medication was sent.  Please send in other

## 2025-06-04 ENCOUNTER — TRANSCRIBE ORDERS (OUTPATIENT)
Dept: ADMINISTRATIVE | Age: 65
End: 2025-06-04

## 2025-06-04 ENCOUNTER — TELEPHONE (OUTPATIENT)
Dept: CARDIOLOGY CLINIC | Age: 65
End: 2025-06-04

## 2025-06-04 DIAGNOSIS — K50.10 CROHN'S DISEASE OF LARGE INTESTINE WITHOUT COMPLICATION (HCC): Primary | ICD-10-CM

## 2025-06-04 NOTE — TELEPHONE ENCOUNTER
Pt stated that he has been experiencing lightheadedness and lower than normal bp. His bp in the PM on 6/4 was 111/69 and he has had readings of 100-110/60-69 with his at home cuff. Pt is concerned that he is on too many medications that effect the bp. Please advise.

## 2025-06-05 ENCOUNTER — TELEPHONE (OUTPATIENT)
Dept: FAMILY MEDICINE CLINIC | Age: 65
End: 2025-06-05

## 2025-06-05 NOTE — TELEPHONE ENCOUNTER
Pt called back and stated that his BP yesterday at his family doc was 100/60     Yesterday pt was resting and when he attempted to get up he had a near syncope event and then checked his BP and it was 110/60   Today pt BP was 106/62     Pt would like communication via [facility] with response or anymore questions.

## 2025-06-05 NOTE — TELEPHONE ENCOUNTER
Patient states that his GI provider is requesting him to have a Vitamin B12 injection to help with stomach ulcers.   Informed the patient that I will have to have orders from GI to schedule unless Dr. Waddell agrees. No recent labs for Vitamin B.   Called [facility], there are no records indicating a Vitamin B12 injection.   Requested records.     Please advise,, thanks.

## 2025-06-05 NOTE — TELEPHONE ENCOUNTER
Can you confirm with the patient?  He mentions on the bottom of his note that he talked about switching the Valium to 1 mg?  I do not think it was the dose that he meant to place there.  I also do see that he has an appointment with Dr. Fam of sleep medicine next month fortunately.

## 2025-06-05 NOTE — TELEPHONE ENCOUNTER
Spoke with patient.  Patient confirmed he meant to type 10 mg valium.    Patient also stated that his GI specialist wants him to have B12 injections.  Advised patient the GI doctor would need to order the injections.  Reports he is going to have labs tomorrow and thinks the level was also included from GI doctor.  Patient wants to know if GI won't order will Dr. Waddell order.    Please advise.

## 2025-06-05 NOTE — TELEPHONE ENCOUNTER
Called and spoke with patient relayed VSP message. He VU to stop amlodipine. Instructed to continue to monitor BP twice daily and call in 1 week with update on symptoms/ blood pressure readings. He VU.

## 2025-06-05 NOTE — TELEPHONE ENCOUNTER
LOV 2/8/24   Pt is scheduled for Loop placement 6/16/25     VSP- would you like pt to have lab visit for orthostatic vitals

## 2025-06-06 DIAGNOSIS — F51.02 ADJUSTMENT INSOMNIA: ICD-10-CM

## 2025-06-06 DIAGNOSIS — F41.9 ANXIETY: Primary | ICD-10-CM

## 2025-06-06 DIAGNOSIS — I10 ESSENTIAL HYPERTENSION: ICD-10-CM

## 2025-06-06 RX ORDER — DIAZEPAM 10 MG/1
10 TABLET ORAL EVERY 12 HOURS PRN
Qty: 60 TABLET | Refills: 2 | Status: SHIPPED | OUTPATIENT
Start: 2025-06-06 | End: 2025-09-04

## 2025-06-06 RX ORDER — LOSARTAN POTASSIUM AND HYDROCHLOROTHIAZIDE 12.5; 1 MG/1; MG/1
1 TABLET ORAL DAILY
Qty: 90 TABLET | Refills: 2 | Status: SHIPPED | OUTPATIENT
Start: 2025-06-06

## 2025-06-06 NOTE — TELEPHONE ENCOUNTER
Refill Request     CONFIRM preferred pharmacy with the patient.    If Mail Order Rx - Pend for 90 day refill.      Last Seen: Last Seen Department: 5/12/2025  Last Seen by PCP: 2/25/2025    Last Written: 12/19/24 90 with 1 refill     If no future appointment scheduled:  Review the last OV with PCP and review information for follow-up visit,  Route STAFF MESSAGE with patient name to the  Pool for scheduling with the following information:            -  Timing of next visit           -  Visit type ie Physical, OV, etc           -  Diagnoses/Reason ie. COPD, HTN - Do not use MEDICATION, Follow-up or CHECK UP - Give reason for visit      Next Appointment:   Future Appointments   Date Time Provider Department Center   6/11/2025  9:40 AM Anne Harrington MD AND ENDO [facility]   6/17/2025  7:00 AM MHC CT MAIN [facility]   7/16/2025 10:30 AM Malcolm Fam MD AND PULM [facility]   11/6/2025 10:00 AM Vandana Gonsalves, DO [facility] [facility]   12/15/2025  8:30 AM Rikki Waddell DO [facility] DEP       Message sent to  to schedule appt with patient?  NO      Requested Prescriptions     Pending Prescriptions Disp Refills    losartan-hydroCHLOROthiazide (HYZAAR) 100-12.5 MG per tablet [Pharmacy Med Name: LOSARTAN-HCTZ 100-12.5 MG TAB] 90 tablet 1     Sig: TAKE 1 TABLET BY MOUTH EVERY DAY

## 2025-06-06 NOTE — TELEPHONE ENCOUNTER
Valium 10 mg sent to the pharmacy.  Ideally you should only take this once a day as needed for severe anxiety but I know he has trouble sleeping so I made the directions twice a day initially to help with sleep.  He should really follow-up with Dr. Fam of sleep medicine to get his opinion on why the patient cannot sleep well.    Regarding the vitamin B12, he was found to be low.  However usually studies show that oral supplementation is just as sufficient as B12 injections.  He should continue taking the vitamin D and likely double the dose because he was low normal last time with Vitamin D level.  The over-the-counter vitamin B12 dose of 1000 mcg and he should take this in the morning with breakfast..  I do not usually do vitamin B12 injections here.  It is GI doctor will not do them, I usually refer downstairs to [facility].

## 2025-06-09 ENCOUNTER — TELEPHONE (OUTPATIENT)
Dept: FAMILY MEDICINE CLINIC | Age: 65
End: 2025-06-09

## 2025-06-09 DIAGNOSIS — K51.90 ULCERATIVE COLITIS WITHOUT COMPLICATIONS, UNSPECIFIED LOCATION (HCC): ICD-10-CM

## 2025-06-09 DIAGNOSIS — K50.119 CROHN'S DISEASE OF LARGE INTESTINE WITH COMPLICATION (HCC): ICD-10-CM

## 2025-06-09 RX ORDER — HYDROCODONE BITARTRATE AND ACETAMINOPHEN 5; 325 MG/1; MG/1
1 TABLET ORAL EVERY 4 HOURS PRN
Qty: 18 TABLET | Refills: 0 | Status: SHIPPED | OUTPATIENT
Start: 2025-06-09 | End: 2025-06-23 | Stop reason: SDUPTHER

## 2025-06-10 ENCOUNTER — HOSPITAL ENCOUNTER (OUTPATIENT)
Age: 65
Discharge: HOME | End: 2025-06-10
Payer: MEDICARE

## 2025-06-10 DIAGNOSIS — E83.42 HYPOMAGNESEMIA: ICD-10-CM

## 2025-06-10 DIAGNOSIS — Z79.4 TYPE 2 DIABETES MELLITUS WITHOUT COMPLICATION, WITH LONG-TERM CURRENT USE OF INSULIN (HCC): ICD-10-CM

## 2025-06-10 DIAGNOSIS — E11.9 TYPE 2 DIABETES MELLITUS WITHOUT COMPLICATION, WITH LONG-TERM CURRENT USE OF INSULIN (HCC): ICD-10-CM

## 2025-06-10 LAB
ALBUMIN SERPL-MCNC: 4.1 G/DL (ref 3.4–5)
ALBUMIN/GLOB SERPL: 1.5 {RATIO} (ref 1.1–2.2)
ALP SERPL-CCNC: 47 U/L (ref 40–129)
ALT SERPL-CCNC: 7 U/L (ref 10–40)
ANION GAP SERPL CALCULATED.3IONS-SCNC: 9 MMOL/L (ref 3–16)
AST SERPL-CCNC: 13 U/L (ref 15–37)
BILIRUB SERPL-MCNC: 0.3 MG/DL (ref 0–1)
BUN SERPL-MCNC: 24 MG/DL (ref 7–20)
CALCIUM SERPL-MCNC: 10 MG/DL (ref 8.3–10.6)
CHLORIDE SERPL-SCNC: 103 MMOL/L (ref 99–110)
CO2 SERPL-SCNC: 28 MMOL/L (ref 21–32)
CREAT SERPL-MCNC: 1.3 MG/DL (ref 0.8–1.3)
EST. AVERAGE GLUCOSE BLD GHB EST-MCNC: 165.7 MG/DL
GFR SERPLBLD CREATININE-BSD FMLA CKD-EPI: 60 ML/MIN/{1.73_M2}
GLUCOSE SERPL-MCNC: 203 MG/DL (ref 70–99)
HBA1C MFR BLD: 7.4 %
MAGNESIUM SERPL-MCNC: 1.2 MG/DL (ref 1.8–2.4)
POTASSIUM SERPL-SCNC: 4 MMOL/L (ref 3.5–5.1)
PROT SERPL-MCNC: 6.8 G/DL (ref 6.4–8.2)
SODIUM SERPL-SCNC: 140 MMOL/L (ref 136–145)

## 2025-06-10 PROCEDURE — 83036 HEMOGLOBIN GLYCOSYLATED A1C: CPT

## 2025-06-10 PROCEDURE — 36415 COLL VENOUS BLD VENIPUNCTURE: CPT

## 2025-06-10 PROCEDURE — 80053 COMPREHEN METABOLIC PANEL: CPT

## 2025-06-10 PROCEDURE — 83735 ASSAY OF MAGNESIUM: CPT

## 2025-06-11 ENCOUNTER — OFFICE VISIT (OUTPATIENT)
Dept: ENDOCRINOLOGY | Age: 65
End: 2025-06-11
Payer: MEDICARE

## 2025-06-11 VITALS
BODY MASS INDEX: 27.25 KG/M2 | DIASTOLIC BLOOD PRESSURE: 78 MMHG | TEMPERATURE: 98 F | OXYGEN SATURATION: 98 % | WEIGHT: 201.2 LBS | HEART RATE: 82 BPM | HEIGHT: 72 IN | RESPIRATION RATE: 16 BRPM | SYSTOLIC BLOOD PRESSURE: 126 MMHG

## 2025-06-11 DIAGNOSIS — I10 ESSENTIAL HYPERTENSION: ICD-10-CM

## 2025-06-11 DIAGNOSIS — Z79.4 TYPE 2 DIABETES MELLITUS WITHOUT COMPLICATION, WITH LONG-TERM CURRENT USE OF INSULIN (HCC): ICD-10-CM

## 2025-06-11 DIAGNOSIS — E83.42 HYPOMAGNESEMIA: Primary | ICD-10-CM

## 2025-06-11 DIAGNOSIS — E11.9 TYPE 2 DIABETES MELLITUS WITHOUT COMPLICATION, WITH LONG-TERM CURRENT USE OF INSULIN (HCC): ICD-10-CM

## 2025-06-11 PROCEDURE — G2211 COMPLEX E/M VISIT ADD ON: HCPCS | Performed by: INTERNAL MEDICINE

## 2025-06-11 PROCEDURE — 3051F HG A1C>EQUAL 7.0%<8.0%: CPT | Performed by: INTERNAL MEDICINE

## 2025-06-11 PROCEDURE — 3074F SYST BP LT 130 MM HG: CPT | Performed by: INTERNAL MEDICINE

## 2025-06-11 PROCEDURE — 3078F DIAST BP <80 MM HG: CPT | Performed by: INTERNAL MEDICINE

## 2025-06-11 PROCEDURE — 1159F MED LIST DOCD IN RCRD: CPT | Performed by: INTERNAL MEDICINE

## 2025-06-11 PROCEDURE — 1123F ACP DISCUSS/DSCN MKR DOCD: CPT | Performed by: INTERNAL MEDICINE

## 2025-06-11 PROCEDURE — 99214 OFFICE O/P EST MOD 30 MIN: CPT | Performed by: INTERNAL MEDICINE

## 2025-06-11 RX ORDER — INSULIN GLARGINE 100 [IU]/ML
28 INJECTION, SOLUTION SUBCUTANEOUS 2 TIMES DAILY
Qty: 60 ML | Refills: 2 | Status: SHIPPED | OUTPATIENT
Start: 2025-06-11

## 2025-06-11 RX ORDER — INSULIN LISPRO 100 [IU]/ML
INJECTION, SOLUTION INTRAVENOUS; SUBCUTANEOUS
Qty: 9 ML | Refills: 2 | Status: SHIPPED | OUTPATIENT
Start: 2025-06-11

## 2025-06-13 ENCOUNTER — PATIENT MESSAGE (OUTPATIENT)
Dept: CARDIOLOGY CLINIC | Age: 65
End: 2025-06-13

## 2025-06-13 NOTE — TELEPHONE ENCOUNTER
The patient has been notified of this information and all questions answered.  Patient will go to the pharmacy for the b12 injection

## 2025-06-13 NOTE — TELEPHONE ENCOUNTER
With regards to the vitamin B12, he was found to be low.  However usually studies show that oral supplementation is just as sufficient as B12 injections.  He should continue taking the vitamin D and likely double the dose because he was low normal last time with Vitamin D level.  The over-the-counter vitamin B12 dose of 1000 mcg and he should take this in the morning with breakfast..  I do not usually do vitamin B12 injections here.  It is GI doctor will not do them, I usually refer downstairs to [facility].

## 2025-06-16 ENCOUNTER — TELEPHONE (OUTPATIENT)
Dept: CARDIOLOGY CLINIC | Age: 65
End: 2025-06-16

## 2025-06-16 NOTE — TELEPHONE ENCOUNTER
Per patient via [employer] message:  Zafar Chong \"Zafar Chong\" [facility] (supporting Vandana Gonsalves DO)        6/13/25  5:10 PM  I cannot afford this at all 31,000 is for this procedure is out of my ability to pay I want this canceled I’m not able to do this please reply        Reply was sent via [employer] letting patient know our office will be contacting him to discuss.

## 2025-06-16 NOTE — TELEPHONE ENCOUNTER
Pt called  left message [facility] they need to cancel loop recorder procedure scheduled 6/16, pt stated they are sick     Pls advise

## 2025-06-17 ENCOUNTER — TRANSCRIBE ORDERS (OUTPATIENT)
Dept: ADMISSION | Age: 65
End: 2025-06-17

## 2025-06-17 ENCOUNTER — HOSPITAL ENCOUNTER (OUTPATIENT)
Dept: CT IMAGING | Age: 65
Discharge: HOME | End: 2025-06-17
Attending: INTERNAL MEDICINE
Payer: MEDICARE

## 2025-06-17 ENCOUNTER — HOSPITAL ENCOUNTER (OUTPATIENT)
Dept: LAB | Age: 65
Discharge: HOME | End: 2025-06-17
Attending: INTERNAL MEDICINE
Payer: MEDICARE

## 2025-06-17 DIAGNOSIS — K50.119 CROHN'S DISEASE OF LARGE INTESTINE WITH COMPLICATION (HCC): ICD-10-CM

## 2025-06-17 DIAGNOSIS — K50.119 CROHN'S DISEASE OF LARGE INTESTINE WITH COMPLICATION (HCC): Primary | ICD-10-CM

## 2025-06-17 DIAGNOSIS — K50.10 CROHN'S DISEASE OF LARGE INTESTINE WITHOUT COMPLICATION (HCC): ICD-10-CM

## 2025-06-17 LAB
ALBUMIN SERPL-MCNC: 3.6 G/DL (ref 3.4–5)
ALBUMIN/GLOB SERPL: 1.1 {RATIO} (ref 1.1–2.2)
ALP SERPL-CCNC: 62 U/L (ref 40–129)
ALT SERPL-CCNC: 21 U/L (ref 10–40)
ANION GAP SERPL CALCULATED.3IONS-SCNC: 12 MMOL/L (ref 3–16)
AST SERPL-CCNC: 19 U/L (ref 15–37)
BILIRUB SERPL-MCNC: 0.5 MG/DL (ref 0–1)
BUN SERPL-MCNC: 24 MG/DL (ref 7–20)
CALCIUM SERPL-MCNC: 9.4 MG/DL (ref 8.3–10.6)
CHLORIDE SERPL-SCNC: 99 MMOL/L (ref 99–110)
CO2 SERPL-SCNC: 26 MMOL/L (ref 21–32)
CREAT SERPL-MCNC: 1.3 MG/DL (ref 0.8–1.3)
DEPRECATED RDW RBC AUTO: 17.1 % (ref 12.4–15.4)
GFR SERPLBLD CREATININE-BSD FMLA CKD-EPI: 60 ML/MIN/{1.73_M2}
GLUCOSE SERPL-MCNC: 169 MG/DL (ref 70–99)
HCT VFR BLD AUTO: 40.2 % (ref 40.5–52.5)
HGB BLD-MCNC: 13.2 G/DL (ref 13.5–17.5)
MCH RBC QN AUTO: 26.4 PG (ref 26–34)
MCHC RBC AUTO-ENTMCNC: 32.8 G/DL (ref 31–36)
MCV RBC AUTO: 80.6 FL (ref 80–100)
PLATELET # BLD AUTO: 237 K/UL (ref 135–450)
PMV BLD AUTO: 8.9 FL (ref 5–10.5)
POTASSIUM SERPL-SCNC: 4.4 MMOL/L (ref 3.5–5.1)
PROT SERPL-MCNC: 6.9 G/DL (ref 6.4–8.2)
RBC # BLD AUTO: 4.99 M/UL (ref 4.2–5.9)
SODIUM SERPL-SCNC: 137 MMOL/L (ref 136–145)
WBC # BLD AUTO: 9.4 K/UL (ref 4–11)

## 2025-06-17 PROCEDURE — 74177 CT ABD & PELVIS W/CONTRAST: CPT

## 2025-06-17 PROCEDURE — 6360000004 HC RX CONTRAST MEDICATION: Performed by: INTERNAL MEDICINE

## 2025-06-17 PROCEDURE — 80053 COMPREHEN METABOLIC PANEL: CPT

## 2025-06-17 PROCEDURE — 85027 COMPLETE CBC AUTOMATED: CPT

## 2025-06-17 RX ORDER — IOPAMIDOL 755 MG/ML
75 INJECTION, SOLUTION INTRAVASCULAR
Status: COMPLETED | OUTPATIENT
Start: 2025-06-17 | End: 2025-06-17

## 2025-06-17 RX ORDER — DIATRIZOATE MEGLUMINE AND DIATRIZOATE SODIUM 660; 100 MG/ML; MG/ML
12 SOLUTION ORAL; RECTAL
Status: DISCONTINUED | OUTPATIENT
Start: 2025-06-17 | End: 2025-06-18 | Stop reason: HOSPADM

## 2025-06-17 RX ADMIN — DIATRIZOATE MEGLUMINE AND DIATRIZOATE SODIUM 12 ML: 660; 100 LIQUID ORAL; RECTAL at 07:19

## 2025-06-17 RX ADMIN — IOPAMIDOL 75 ML: 755 INJECTION, SOLUTION INTRAVENOUS at 07:19

## 2025-06-23 ENCOUNTER — TRANSCRIBE ORDERS (OUTPATIENT)
Dept: ADMINISTRATIVE | Age: 65
End: 2025-06-23

## 2025-06-23 DIAGNOSIS — K86.1 IDIOPATHIC CHRONIC PANCREATITIS (HCC): Primary | ICD-10-CM

## 2025-06-23 DIAGNOSIS — K50.119 CROHN'S DISEASE OF LARGE INTESTINE WITH COMPLICATION (HCC): ICD-10-CM

## 2025-06-23 DIAGNOSIS — K51.90 ULCERATIVE COLITIS WITHOUT COMPLICATIONS, UNSPECIFIED LOCATION (HCC): ICD-10-CM

## 2025-06-23 RX ORDER — FLUTICASONE PROPIONATE 50 MCG
2 SPRAY, SUSPENSION (ML) NASAL DAILY
Qty: 48 ML | Refills: 1 | Status: SHIPPED | OUTPATIENT
Start: 2025-06-23

## 2025-06-23 NOTE — TELEPHONE ENCOUNTER
Patient asking for this medication to be refilled until he gets in to see pain management he stated that his appointment with them is in 3 weeks.     Appointment 7/16/2025 10:30am  Dr nava Shay in [facility]

## 2025-06-23 NOTE — TELEPHONE ENCOUNTER
Refill Request     CONFIRM preferred pharmacy with the patient.    If Mail Order Rx - Pend for 90 day refill.      Last Seen: Last Seen Department: 5/12/2025  Last Seen by PCP: 5/12/2025    Last Written: 12/16/24 3 with 1 refill     If no future appointment scheduled:  Review the last OV with PCP and review information for follow-up visit,  Route STAFF MESSAGE with patient name to the  Pool for scheduling with the following information:            -  Timing of next visit           -  Visit type ie Physical, OV, etc           -  Diagnoses/Reason ie. COPD, HTN - Do not use MEDICATION, Follow-up or CHECK UP - Give reason for visit      Next Appointment:   Future Appointments   Date Time Provider Department [place]   7/16/2025 10:30 AM Malcolm Fam MD AND PULM [facility]   10/21/2025  9:20 AM Anne Harrington MD AND ENDO [facility]   11/6/2025 10:00 AM Vandana Gonsalves DO [facility]   12/15/2025  8:30 AM Rikki Waddell DO [facility] DEP       Message sent to  to schedule appt with patient?  NO      Requested Prescriptions     Pending Prescriptions Disp Refills    fluticasone (FLONASE) 50 MCG/ACT nasal spray [Pharmacy Med Name: FLUTICASONE PROP 50 MCG SPRAY] 48 mL 1     Sig: SPRAY 2 SPRAYS INTO EACH NOSTRIL EVERY DAY

## 2025-06-24 RX ORDER — HYDROCODONE BITARTRATE AND ACETAMINOPHEN 5; 325 MG/1; MG/1
1 TABLET ORAL EVERY 4 HOURS PRN
Qty: 18 TABLET | Refills: 0 | Status: SHIPPED | OUTPATIENT
Start: 2025-06-24 | End: 2025-06-27

## 2025-07-02 ENCOUNTER — TRANSCRIBE ORDERS (OUTPATIENT)
Dept: ADMISSION | Age: 65
End: 2025-07-02

## 2025-07-02 ENCOUNTER — HOSPITAL ENCOUNTER (OUTPATIENT)
Dept: NUCLEAR MEDICINE | Age: 65
Discharge: HOME | End: 2025-07-02
Attending: INTERNAL MEDICINE
Payer: MEDICARE

## 2025-07-02 ENCOUNTER — HOSPITAL ENCOUNTER (OUTPATIENT)
Dept: LAB | Age: 65
Discharge: HOME | End: 2025-07-02
Attending: INTERNAL MEDICINE
Payer: MEDICARE

## 2025-07-02 VITALS — WEIGHT: 186 LBS | BODY MASS INDEX: 25.22 KG/M2

## 2025-07-02 DIAGNOSIS — K86.1 IDIOPATHIC CHRONIC PANCREATITIS (HCC): Primary | ICD-10-CM

## 2025-07-02 DIAGNOSIS — K50.10 CROHN'S DISEASE OF LARGE INTESTINE WITHOUT COMPLICATION (HCC): ICD-10-CM

## 2025-07-02 DIAGNOSIS — K86.1 IDIOPATHIC CHRONIC PANCREATITIS (HCC): ICD-10-CM

## 2025-07-02 LAB
ALBUMIN SERPL-MCNC: 3.9 G/DL (ref 3.4–5)
ALBUMIN/GLOB SERPL: 1.4 {RATIO} (ref 1.1–2.2)
ALP SERPL-CCNC: 60 U/L (ref 40–129)
ALT SERPL-CCNC: 18 U/L (ref 10–40)
ANION GAP SERPL CALCULATED.3IONS-SCNC: 9 MMOL/L (ref 3–16)
AST SERPL-CCNC: 17 U/L (ref 15–37)
BILIRUB SERPL-MCNC: 0.5 MG/DL (ref 0–1)
BUN SERPL-MCNC: 14 MG/DL (ref 7–20)
CALCIUM SERPL-MCNC: 9.9 MG/DL (ref 8.3–10.6)
CHLORIDE SERPL-SCNC: 104 MMOL/L (ref 99–110)
CO2 SERPL-SCNC: 28 MMOL/L (ref 21–32)
CREAT SERPL-MCNC: 1.3 MG/DL (ref 0.8–1.3)
GFR SERPLBLD CREATININE-BSD FMLA CKD-EPI: 60 ML/MIN/{1.73_M2}
GLUCOSE SERPL-MCNC: 94 MG/DL (ref 70–99)
POTASSIUM SERPL-SCNC: 4.3 MMOL/L (ref 3.5–5.1)
PROT SERPL-MCNC: 6.7 G/DL (ref 6.4–8.2)
SODIUM SERPL-SCNC: 141 MMOL/L (ref 136–145)
VIT B12 SERPL-MCNC: 1464 PG/ML (ref 211–911)

## 2025-07-02 PROCEDURE — 2580000003 HC RX 258: Performed by: INTERNAL MEDICINE

## 2025-07-02 PROCEDURE — 82607 VITAMIN B-12: CPT

## 2025-07-02 PROCEDURE — A9537 TC99M MEBROFENIN: HCPCS | Performed by: INTERNAL MEDICINE

## 2025-07-02 PROCEDURE — 3430000000 HC RX DIAGNOSTIC RADIOPHARMACEUTICAL: Performed by: INTERNAL MEDICINE

## 2025-07-02 PROCEDURE — 36415 COLL VENOUS BLD VENIPUNCTURE: CPT

## 2025-07-02 PROCEDURE — 80053 COMPREHEN METABOLIC PANEL: CPT

## 2025-07-02 PROCEDURE — 6360000002 HC RX W HCPCS: Performed by: INTERNAL MEDICINE

## 2025-07-02 PROCEDURE — 78227 HEPATOBIL SYST IMAGE W/DRUG: CPT

## 2025-07-02 RX ADMIN — SINCALIDE 1.69 MCG: 5 INJECTION, POWDER, LYOPHILIZED, FOR SOLUTION INTRAVENOUS at 09:42

## 2025-07-02 RX ADMIN — Medication 6.1 MILLICURIE: at 07:10

## 2025-07-07 RX ORDER — ATORVASTATIN CALCIUM 40 MG/1
40 TABLET, FILM COATED ORAL DAILY
Qty: 90 TABLET | Refills: 1 | Status: SHIPPED | OUTPATIENT
Start: 2025-07-07

## 2025-07-07 NOTE — TELEPHONE ENCOUNTER
.Refill Request     CONFIRM preferred pharmacy with the patient.    If Mail Order Rx - Pend for 90 day refill.      Last Seen: Last Seen Department: 5/12/2025  Last Seen by PCP: 5/12/2025    Last Written: 3-31-25 90 with 0     If no future appointment scheduled:  Review the last OV with PCP and review information for follow-up visit,  Route STAFF MESSAGE with patient name to the  Pool for scheduling with the following information:            -  Timing of next visit           -  Visit type ie Physical, OV, etc           -  Diagnoses/Reason ie. COPD, HTN - Do not use MEDICATION, Follow-up or CHECK UP - Give reason for visit      Next Appointment:   Future Appointments   Date Time Provider Department Center   7[facility]16[facility]2025 10:30 [facility]   10/21/2025  9:20 AM Anne Harrington MD AND ENDO [facility]   11/6/2025 10:00 AM Vandana Gonsalves DO [facility]   12/15/2025  8:30 AM Rikki Waddell DO [facility] DEP       Message sent to  to schedule appt with patient?  NO      Requested Prescriptions     Pending Prescriptions Disp Refills    atorvastatin (LIPITOR) 40 MG tablet [Pharmacy Med Name: ATORVASTATIN 40 MG TABLET] 90 tablet 1     Sig: TAKE 1 TABLET BY MOUTH EVERY DAY

## 2025-07-08 NOTE — PROGRESS NOTES
[facility] PRE-OPERATIVE INSTRUCTIONS       DOS: __7/16/2025__        Pre-Op Instructions     Patients receiving local anesthetic only will arrive one hour prior to the procedure, all other patients will arrive 1.5 hours prior to procedure time.    [x]  A History and Physical will be required within 30 days prior to surgery date. Some patients may require cardiac or pulmonary clearance. H&P will be completed DOS for Endo/colonoscopy patients.     [x]  Reviewed Medical and Surgical history, medication list, confirmed with patient any implants, allergies, bleeding disorders, SEB and reactions to Anesthesia.    [x]  If there is a change in physical condition between now and the day of surgery, please notify your surgeon. This includes a cough, cold, fever, sore throat, nausea, vomiting and diarrhea. Also notify your surgeon if you experience dizziness, shortness of breath or blurred vision.    [x] Reviewed hx of C-Diff, MRSA, VRE and/or recent use of Antibiotics     [x]  All patients having a procedure must have a ride home by a responsible person that is over the age of 18 and ensure it is someone that we can share medical information with. After discharge, a responsible adult needs to stay with you for 24 hours. There is a limit of 2 adult visitors per room.     If unable to secure ride and/or care taker, please contact surgeon's office.      [x]  No alcohol, smoking or marijuana use 24 hours prior to surgery. Any use of recreational drugs must be stopped 5 days prior to surgery.     [x]  NPO after midnight (Any heart, BP, seizure, thyroid and breathing medications are okay to take the morning of surgery with a small sip of water 4 hours prior to procedure).    The morning of surgery, you may brush your teeth, just no swallowing water. Also, NO gum, candy, mints or ice chips.    []  For Colonoscopy's, follow prep-instructions as indicated by physician.     [] GLP1 diabetic injections, and Adipex,

## 2025-07-15 ENCOUNTER — ANESTHESIA EVENT (OUTPATIENT)
Age: 65
End: 2025-07-15
Payer: MEDICARE

## 2025-07-15 RX ORDER — SODIUM CHLORIDE 0.9 % (FLUSH) 0.9 %
5-40 SYRINGE (ML) INJECTION EVERY 12 HOURS SCHEDULED
Status: CANCELLED | OUTPATIENT
Start: 2025-07-15

## 2025-07-15 RX ORDER — SODIUM CHLORIDE 0.9 % (FLUSH) 0.9 %
5-40 SYRINGE (ML) INJECTION PRN
Status: CANCELLED | OUTPATIENT
Start: 2025-07-15

## 2025-07-15 RX ORDER — SODIUM CHLORIDE 9 MG/ML
INJECTION, SOLUTION INTRAVENOUS PRN
Status: CANCELLED | OUTPATIENT
Start: 2025-07-15

## 2025-07-16 ENCOUNTER — ANESTHESIA (OUTPATIENT)
Age: 65
End: 2025-07-16
Payer: MEDICARE

## 2025-07-16 ENCOUNTER — HOSPITAL ENCOUNTER (OUTPATIENT)
Age: 65
Setting detail: OUTPATIENT SURGERY
Discharge: HOME | End: 2025-07-16
Attending: INTERNAL MEDICINE | Admitting: INTERNAL MEDICINE
Payer: MEDICARE

## 2025-07-16 ENCOUNTER — APPOINTMENT (OUTPATIENT)
Age: 65
End: 2025-07-16
Attending: INTERNAL MEDICINE
Payer: MEDICARE

## 2025-07-16 VITALS
HEIGHT: 72 IN | HEART RATE: 54 BPM | RESPIRATION RATE: 16 BRPM | OXYGEN SATURATION: 98 % | TEMPERATURE: 97.3 F | BODY MASS INDEX: 24.38 KG/M2 | SYSTOLIC BLOOD PRESSURE: 130 MMHG | DIASTOLIC BLOOD PRESSURE: 79 MMHG | WEIGHT: 180 LBS

## 2025-07-16 DIAGNOSIS — K86.89 PANCREATIC INSUFFICIENCY: Primary | ICD-10-CM

## 2025-07-16 DIAGNOSIS — K86.1 IDIOPATHIC CHRONIC PANCREATITIS (HCC): ICD-10-CM

## 2025-07-16 LAB
GLUCOSE BLD-MCNC: 107 MG/DL (ref 70–99)
SEND OUT REPORT: NORMAL
TEST NAME: NORMAL

## 2025-07-16 PROCEDURE — 7100000000 HC PACU RECOVERY - FIRST 15 MIN: Performed by: INTERNAL MEDICINE

## 2025-07-16 PROCEDURE — 6360000002 HC RX W HCPCS

## 2025-07-16 PROCEDURE — 2580000003 HC RX 258

## 2025-07-16 PROCEDURE — C1753 CATH, INTRAVAS ULTRASOUND: HCPCS | Performed by: INTERNAL MEDICINE

## 2025-07-16 PROCEDURE — 74330 X-RAY BILE/PANC ENDOSCOPY: CPT

## 2025-07-16 PROCEDURE — 82378 CARCINOEMBRYONIC ANTIGEN: CPT

## 2025-07-16 PROCEDURE — 2720000010 HC SURG SUPPLY STERILE: Performed by: INTERNAL MEDICINE

## 2025-07-16 PROCEDURE — 3609018500 HC EGD US SCOPE W/ADJACENT STRUCTURES: Performed by: INTERNAL MEDICINE

## 2025-07-16 PROCEDURE — 3609014900 HC ERCP W/SPHINCTEROTOMY &/OR PAPILLOTOMY: Performed by: INTERNAL MEDICINE

## 2025-07-16 PROCEDURE — 7100000010 HC PHASE II RECOVERY - FIRST 15 MIN: Performed by: INTERNAL MEDICINE

## 2025-07-16 PROCEDURE — 7100000011 HC PHASE II RECOVERY - ADDTL 15 MIN: Performed by: INTERNAL MEDICINE

## 2025-07-16 PROCEDURE — 7100000001 HC PACU RECOVERY - ADDTL 15 MIN: Performed by: INTERNAL MEDICINE

## 2025-07-16 PROCEDURE — 6370000000 HC RX 637 (ALT 250 FOR IP): Performed by: INTERNAL MEDICINE

## 2025-07-16 PROCEDURE — 6360000002 HC RX W HCPCS: Performed by: INTERNAL MEDICINE

## 2025-07-16 PROCEDURE — C2617 STENT, NON-COR, TEM W/O DEL: HCPCS | Performed by: INTERNAL MEDICINE

## 2025-07-16 PROCEDURE — 3700000001 HC ADD 15 MINUTES (ANESTHESIA): Performed by: INTERNAL MEDICINE

## 2025-07-16 PROCEDURE — 2709999900 HC NON-CHARGEABLE SUPPLY: Performed by: INTERNAL MEDICINE

## 2025-07-16 PROCEDURE — 2500000003 HC RX 250 WO HCPCS

## 2025-07-16 PROCEDURE — 82962 GLUCOSE BLOOD TEST: CPT

## 2025-07-16 PROCEDURE — 88173 CYTOPATH EVAL FNA REPORT: CPT

## 2025-07-16 PROCEDURE — 88313 SPECIAL STAINS GROUP 2: CPT

## 2025-07-16 PROCEDURE — 6360000004 HC RX CONTRAST MEDICATION

## 2025-07-16 PROCEDURE — 3609015100 HC ERCP STENT PLACEMENT BILIARY/PANCREATIC DUCT: Performed by: INTERNAL MEDICINE

## 2025-07-16 PROCEDURE — 3700000000 HC ANESTHESIA ATTENDED CARE: Performed by: INTERNAL MEDICINE

## 2025-07-16 PROCEDURE — 88305 TISSUE EXAM BY PATHOLOGIST: CPT

## 2025-07-16 DEVICE — ZIMMON PANCREATIC STENT
Type: IMPLANTABLE DEVICE | Status: FUNCTIONAL
Brand: ZIMMON

## 2025-07-16 RX ORDER — SODIUM CHLORIDE 0.9 % (FLUSH) 0.9 %
5-40 SYRINGE (ML) INJECTION PRN
Status: DISCONTINUED | OUTPATIENT
Start: 2025-07-16 | End: 2025-07-16 | Stop reason: HOSPADM

## 2025-07-16 RX ORDER — IOPAMIDOL 755 MG/ML
100 INJECTION, SOLUTION INTRAVASCULAR ONCE
Status: COMPLETED | OUTPATIENT
Start: 2025-07-16 | End: 2025-07-16

## 2025-07-16 RX ORDER — ONDANSETRON 2 MG/ML
4 INJECTION INTRAMUSCULAR; INTRAVENOUS
Status: DISCONTINUED | OUTPATIENT
Start: 2025-07-16 | End: 2025-07-16 | Stop reason: HOSPADM

## 2025-07-16 RX ORDER — BUPIVACAINE HYDROCHLORIDE 5 MG/ML
30 INJECTION, SOLUTION EPIDURAL; INTRACAUDAL; PERINEURAL ONCE
Status: DISCONTINUED | OUTPATIENT
Start: 2025-07-16 | End: 2025-07-16 | Stop reason: HOSPADM

## 2025-07-16 RX ORDER — DROPERIDOL 2.5 MG/ML
0.62 INJECTION, SOLUTION INTRAMUSCULAR; INTRAVENOUS
Status: DISCONTINUED | OUTPATIENT
Start: 2025-07-16 | End: 2025-07-16 | Stop reason: HOSPADM

## 2025-07-16 RX ORDER — SUCCINYLCHOLINE/SOD CL,ISO/PF 200MG/10ML
SYRINGE (ML) INTRAVENOUS
Status: DISCONTINUED | OUTPATIENT
Start: 2025-07-16 | End: 2025-07-16 | Stop reason: SDUPTHER

## 2025-07-16 RX ORDER — HYDROCODONE BITARTRATE AND ACETAMINOPHEN 5; 325 MG/1; MG/1
1 TABLET ORAL EVERY 8 HOURS PRN
Qty: 21 TABLET | Refills: 0 | Status: SHIPPED | OUTPATIENT
Start: 2025-07-16 | End: 2025-07-23

## 2025-07-16 RX ORDER — ONDANSETRON 2 MG/ML
INJECTION INTRAMUSCULAR; INTRAVENOUS
Status: DISCONTINUED | OUTPATIENT
Start: 2025-07-16 | End: 2025-07-16 | Stop reason: SDUPTHER

## 2025-07-16 RX ORDER — SODIUM CHLORIDE 9 MG/ML
INJECTION, SOLUTION INTRAVENOUS CONTINUOUS
Status: CANCELLED | OUTPATIENT
Start: 2025-07-16

## 2025-07-16 RX ORDER — CIPROFLOXACIN 2 MG/ML
400 INJECTION, SOLUTION INTRAVENOUS ONCE
Status: COMPLETED | OUTPATIENT
Start: 2025-07-16 | End: 2025-07-16

## 2025-07-16 RX ORDER — METHYLPREDNISOLONE ACETATE 40 MG/ML
40 INJECTION, SUSPENSION INTRA-ARTICULAR; INTRALESIONAL; INTRAMUSCULAR; SOFT TISSUE ONCE
Status: DISCONTINUED | OUTPATIENT
Start: 2025-07-16 | End: 2025-07-16 | Stop reason: HOSPADM

## 2025-07-16 RX ORDER — SODIUM CHLORIDE 9 MG/ML
INJECTION, SOLUTION INTRAVENOUS PRN
Status: DISCONTINUED | OUTPATIENT
Start: 2025-07-16 | End: 2025-07-16 | Stop reason: HOSPADM

## 2025-07-16 RX ORDER — ROCURONIUM BROMIDE 10 MG/ML
INJECTION, SOLUTION INTRAVENOUS
Status: DISCONTINUED | OUTPATIENT
Start: 2025-07-16 | End: 2025-07-16 | Stop reason: SDUPTHER

## 2025-07-16 RX ORDER — FENTANYL CITRATE 50 UG/ML
25 INJECTION, SOLUTION INTRAMUSCULAR; INTRAVENOUS EVERY 5 MIN PRN
Status: DISCONTINUED | OUTPATIENT
Start: 2025-07-16 | End: 2025-07-16 | Stop reason: HOSPADM

## 2025-07-16 RX ORDER — INDOMETHACIN 100 MG
100 SUPPOSITORY, RECTAL RECTAL ONCE
Status: COMPLETED | OUTPATIENT
Start: 2025-07-16 | End: 2025-07-16

## 2025-07-16 RX ORDER — LIDOCAINE HYDROCHLORIDE 20 MG/ML
INJECTION, SOLUTION EPIDURAL; INFILTRATION; INTRACAUDAL; PERINEURAL
Status: DISCONTINUED | OUTPATIENT
Start: 2025-07-16 | End: 2025-07-16 | Stop reason: SDUPTHER

## 2025-07-16 RX ORDER — FENTANYL CITRATE 50 UG/ML
INJECTION, SOLUTION INTRAMUSCULAR; INTRAVENOUS
Status: DISCONTINUED | OUTPATIENT
Start: 2025-07-16 | End: 2025-07-16 | Stop reason: SDUPTHER

## 2025-07-16 RX ORDER — DEXAMETHASONE SODIUM PHOSPHATE 4 MG/ML
INJECTION, SOLUTION INTRA-ARTICULAR; INTRALESIONAL; INTRAMUSCULAR; INTRAVENOUS; SOFT TISSUE
Status: DISCONTINUED | OUTPATIENT
Start: 2025-07-16 | End: 2025-07-16 | Stop reason: SDUPTHER

## 2025-07-16 RX ORDER — SODIUM CHLORIDE 0.9 % (FLUSH) 0.9 %
5-40 SYRINGE (ML) INJECTION EVERY 12 HOURS SCHEDULED
Status: DISCONTINUED | OUTPATIENT
Start: 2025-07-16 | End: 2025-07-16 | Stop reason: HOSPADM

## 2025-07-16 RX ORDER — IOPAMIDOL 755 MG/ML
INJECTION, SOLUTION INTRAVASCULAR
Status: COMPLETED
Start: 2025-07-16 | End: 2025-07-16

## 2025-07-16 RX ORDER — SODIUM CHLORIDE 9 MG/ML
INJECTION, SOLUTION INTRAVENOUS
Status: DISCONTINUED | OUTPATIENT
Start: 2025-07-16 | End: 2025-07-16 | Stop reason: SDUPTHER

## 2025-07-16 RX ORDER — PROPOFOL 10 MG/ML
INJECTION, EMULSION INTRAVENOUS
Status: DISCONTINUED | OUTPATIENT
Start: 2025-07-16 | End: 2025-07-16 | Stop reason: SDUPTHER

## 2025-07-16 RX ADMIN — Medication 100 MG: at 08:57

## 2025-07-16 RX ADMIN — ONDANSETRON 4 MG: 2 INJECTION, SOLUTION INTRAMUSCULAR; INTRAVENOUS at 09:02

## 2025-07-16 RX ADMIN — SODIUM CHLORIDE: 9 INJECTION, SOLUTION INTRAVENOUS at 08:52

## 2025-07-16 RX ADMIN — LIDOCAINE HYDROCHLORIDE 80 MG: 20 INJECTION, SOLUTION EPIDURAL; INFILTRATION; INTRACAUDAL; PERINEURAL at 08:57

## 2025-07-16 RX ADMIN — IOPAMIDOL 5.5 ML: 755 INJECTION, SOLUTION INTRAVASCULAR at 09:06

## 2025-07-16 RX ADMIN — FENTANYL CITRATE 50 MCG: 50 INJECTION, SOLUTION INTRAMUSCULAR; INTRAVENOUS at 09:30

## 2025-07-16 RX ADMIN — FENTANYL CITRATE 50 MCG: 50 INJECTION, SOLUTION INTRAMUSCULAR; INTRAVENOUS at 08:57

## 2025-07-16 RX ADMIN — IOPAMIDOL 5.5 ML: 755 INJECTION, SOLUTION INTRAVENOUS at 09:06

## 2025-07-16 RX ADMIN — PROPOFOL 180 MCG/KG/MIN: 10 INJECTION, EMULSION INTRAVENOUS at 08:58

## 2025-07-16 RX ADMIN — ROCURONIUM BROMIDE 10 MG: 10 INJECTION, SOLUTION INTRAVENOUS at 08:57

## 2025-07-16 RX ADMIN — ROCURONIUM BROMIDE 15 MG: 10 INJECTION, SOLUTION INTRAVENOUS at 09:02

## 2025-07-16 RX ADMIN — SUGAMMADEX 200 MG: 100 INJECTION, SOLUTION INTRAVENOUS at 10:06

## 2025-07-16 RX ADMIN — Medication 100 MG: at 09:05

## 2025-07-16 RX ADMIN — DEXAMETHASONE SODIUM PHOSPHATE 8 MG: 4 INJECTION, SOLUTION INTRAMUSCULAR; INTRAVENOUS at 09:02

## 2025-07-16 RX ADMIN — CIPROFLOXACIN 400 MG: 400 INJECTION, SOLUTION INTRAVENOUS at 09:02

## 2025-07-16 ASSESSMENT — PAIN - FUNCTIONAL ASSESSMENT
PAIN_FUNCTIONAL_ASSESSMENT: NONE - DENIES PAIN

## 2025-07-16 ASSESSMENT — ENCOUNTER SYMPTOMS: SHORTNESS OF BREATH: 0

## 2025-07-16 NOTE — ANESTHESIA POSTPROCEDURE EVALUATION
Department of Anesthesiology  Postprocedure Note    Patient: Zafar Chong  MRN: [id number]  YOB: 1960  Date of evaluation: 7/16/2025    Procedure Summary       Date: 07/16/25 Room / Location: [address]    Anesthesia Start: 0852 Anesthesia Stop: 1017    Procedures:       ENDOSCOPIC RETROGRADE CHOLANGIOPANCREATOGRAPHY SPHINCTER/PAPILLOTOMY      ESOPHAGOGASTRODUODENOSCOPY ULTRASOUND      ENDOSCOPIC RETROGRADE CHOLANGIOPANCREATOGRAPHY STENT INSERTION Diagnosis:       Idiopathic chronic pancreatitis (HCC)      (Idiopathic chronic pancreatitis (HCC) [K86.1])    Surgeons: Nilesh Landon MD Responsible Provider: Alex James MD    Anesthesia Type: general ASA Status: 3            Anesthesia Type: No value filed.    Cristine Phase I: Cristine Score: 9    Cristine Phase II: Cristine Score: 10    Anesthesia Post Evaluation    Patient location during evaluation: PACU  Patient participation: complete - patient participated  Level of consciousness: awake  Airway patency: patent  Nausea & Vomiting: no nausea and no vomiting  Cardiovascular status: blood pressure returned to baseline  Respiratory status: acceptable  Hydration status: stable  Comments: Vital signs stable  OK to discharge from Stage I post anesthesia care.  Care transferred from Anesthesiology department on discharge from perioperative area   Multimodal analgesia pain management approach  Pain management: satisfactory to patient    No notable events documented.

## 2025-07-16 NOTE — H&P
Gastroenterology Note             Pre-operative History and Physical    Patient: Zafar Chong  : 1960  CSN:     History Obtained From:  patient and/or guardian.     HISTORY OF PRESENT ILLNESS:    The patient is a 64 y.o. male  here for endoscopic ultrasound and ERCP    This very pleasant 64-year-old male who had gone to the [facility] and he was told he had pancreatitis and he has never had an ERCP he continues to struggle and have lots of issues with pain and there is a question of whether or not he truly has chronic pancreatitis or not several we are doing the EUS and ERCP today        Past Medical History:    Past Medical History:   Diagnosis Date    Anxiety ?    Chronic back pain     Diverticulitis     DVT (deep venous thrombosis) (HCC)     GERD (gastroesophageal reflux disease) When i was 34    Under control    Hyperlipidemia     Hypertension     Irritable bowel syndrome     Type 2 diabetes mellitus without complication (HCC) 15 years    Type II or unspecified type diabetes mellitus without mention of complication, not stated as uncontrolled     Ulcerative colitis (HCC)     Umbilical hernia     having surgery      Past Surgical History:    Past Surgical History:   Procedure Laterality Date    ABDOMINAL ADHESION SURGERY      ABDOMINAL WALL MESH  REMOVAL  10/13/2016    drainage of seroma    APPENDECTOMY  2014    CATARACT REMOVAL WITH IMPLANT Left 2015    COLECTOMY      With Anastamosis related to diverticulitis    COLECTOMY      With anastamosis    COLONOSCOPY  2014    COLONOSCOPY  09/15/2021    [facility]    COLONOSCOPY  10/03/2014    COLONOSCOPY  2012    COLONOSCOPY N/A 2023    COLONOSCOPY POLYPECTOMY SNARE/COLD BIOPSY performed by Nilesh Landon MD at [facility] ENDOSCOPY    COLONOSCOPY N/A 10/25/2023    COLONOSCOPY WITH BIOPSY performed by Nilesh Landon MD at [facility] ENDOSCOPY    LEFT COLECTOMY  2014    Open left colectomy with appendectomy

## 2025-07-16 NOTE — PROGRESS NOTES
[employer] has verified that procedural consent is correctly filled out prior to the start of the procedure.   pancreatic cyst fluid for CEA, amylase, muci-carmine stain  the start of the procedure.

## 2025-07-16 NOTE — ANESTHESIA PRE PROCEDURE
Department of Anesthesiology  Preprocedure Note       Name:  Zafar Chong   Age:  64 y.o.  :  1960                                          MRN:  [id number]         Date:  2025      Surgeon: Surgeon(s):  Nilesh Landon MD    Procedure: Procedure(s):  ESOPHAGOGASTRODUODENOSCOPY WITH ENDOSCOPIC ULTRASOUND  ENDOSCOPIC RETROGRADE CHOLANGIOPANCREATOGRAPHY    Medications prior to admission:   Prior to Admission medications    Medication Sig Start Date End Date Taking? Authorizing Provider   atorvastatin (LIPITOR) 40 MG tablet TAKE 1 TABLET BY MOUTH EVERY DAY  Patient not taking: Reported on 2025   Rikki Waddell DO   fluticasone (FLONASE) 50 MCG/ACT nasal spray SPRAY 2 SPRAYS INTO EACH NOSTRIL EVERY DAY  Patient taking differently: 2 sprays by Each Nostril route daily as needed 25   Rikki Waddell DO   insulin glargine (LANTUS SOLOSTAR) 100 UNIT/ML injection pen Inject 28 Units into the skin 2 times daily 25   Anne Harrington MD   insulin lispro, 1 Unit Dial, (HUMALOG/ADMELOG) 100 UNIT/ML SOPN With meals (during the day)    < 150 ---     0 units                                 151-200:    2 units                                 201-250:    4 units                                 251-300:    6 units                                 301-350:    8 units                                 >350:     10 units 25   Anne Harrington MD   MAGNESIUM PO Take 3 capsules by mouth daily 600 mg    Kari Terry MD   losartan-hydroCHLOROthiazide (HYZAAR) 100-12.5 MG per tablet TAKE 1 TABLET BY MOUTH EVERY DAY 25   Rikki Waddell DO   diazePAM (VALIUM) 10 MG tablet Take 1 tablet by mouth every 12 hours as needed for Anxiety or Sleep for up to 90 days. Max Daily Amount: 20 mg 25  Rikki Waddell DO   B-D 3CC LUER-EDEN SYR 79QT1-5/2 23G X 1-2\" 3 ML MISC USE 1 SYRINGE INTRAMUSCULARLY ONCE A WEEK USE TO DRAW UP TESTSTERONE 3/12/25   Kari Terry MD   BD PLASTIPAK

## 2025-07-16 NOTE — PROGRESS NOTES
Pt states ready to go home. Pt discharged in stable condition. Pt offers no c/o. Knows to  rx fm his pharmacy. Anesthesia md into see pt before DC. Dr Landon was into talk to pt and wife. Also instructed on ap/cdb/npo status -verbalized understanding. Pt states HR runs in 50 s usually.

## 2025-07-16 NOTE — PROGRESS NOTES
Pt arrived to pre procedure room 2. A+Ox4. Consents reviewed/IV placed/assessment completed.    Pt educated on safety precautions and use of call light. Family at bedside.

## 2025-07-17 LAB — NON-GYN CYTOLOGY REPORT: NORMAL

## 2025-07-21 ENCOUNTER — APPOINTMENT (OUTPATIENT)
Dept: CT IMAGING | Age: 65
End: 2025-07-21
Payer: MEDICARE

## 2025-07-21 ENCOUNTER — HOSPITAL ENCOUNTER (OUTPATIENT)
Age: 65
Setting detail: OBSERVATION
Discharge: HOME | End: 2025-07-22
Attending: EMERGENCY MEDICINE | Admitting: STUDENT IN AN ORGANIZED HEALTH CARE EDUCATION/TRAINING PROGRAM
Payer: MEDICARE

## 2025-07-21 ENCOUNTER — APPOINTMENT (OUTPATIENT)
Dept: GENERAL RADIOLOGY | Age: 65
End: 2025-07-21
Payer: MEDICARE

## 2025-07-21 DIAGNOSIS — R11.2 NAUSEA AND VOMITING, UNSPECIFIED VOMITING TYPE: ICD-10-CM

## 2025-07-21 DIAGNOSIS — R10.13 EPIGASTRIC PAIN: ICD-10-CM

## 2025-07-21 DIAGNOSIS — Z96.89 PRESENCE OF PANCREATIC DUCT STENT: Primary | ICD-10-CM

## 2025-07-21 PROBLEM — R10.9 ABDOMINAL PAIN: Status: ACTIVE | Noted: 2025-07-21

## 2025-07-21 LAB
ALBUMIN SERPL-MCNC: 4 G/DL (ref 3.4–5)
ALBUMIN/GLOB SERPL: 1.4 {RATIO} (ref 1.1–2.2)
ALP SERPL-CCNC: 75 U/L (ref 40–129)
ALT SERPL-CCNC: 19 U/L (ref 10–40)
ANION GAP SERPL CALCULATED.3IONS-SCNC: 12 MMOL/L (ref 3–16)
AST SERPL-CCNC: 16 U/L (ref 15–37)
BASOPHILS # BLD: 0 K/UL (ref 0–0.2)
BASOPHILS NFR BLD: 0.5 %
BILIRUB SERPL-MCNC: 0.4 MG/DL (ref 0–1)
BILIRUB UR QL STRIP.AUTO: NEGATIVE
BUN SERPL-MCNC: 17 MG/DL (ref 7–20)
CALCIUM SERPL-MCNC: 10.2 MG/DL (ref 8.3–10.6)
CHLORIDE SERPL-SCNC: 102 MMOL/L (ref 99–110)
CLARITY UR: CLEAR
CO2 SERPL-SCNC: 24 MMOL/L (ref 21–32)
COLOR UR: YELLOW
CREAT SERPL-MCNC: 1.3 MG/DL (ref 0.8–1.3)
CRP SERPL-MCNC: 8.6 MG/L (ref 0–5.1)
DEPRECATED RDW RBC AUTO: 17.1 % (ref 12.4–15.4)
EOSINOPHIL # BLD: 0.1 K/UL (ref 0–0.6)
EOSINOPHIL NFR BLD: 1.6 %
ERYTHROCYTE [SEDIMENTATION RATE] IN BLOOD BY WESTERGREN METHOD: 32 MM/HR (ref 0–20)
GFR SERPLBLD CREATININE-BSD FMLA CKD-EPI: 60 ML/MIN/{1.73_M2}
GLUCOSE BLD-MCNC: 171 MG/DL (ref 70–99)
GLUCOSE SERPL-MCNC: 244 MG/DL (ref 70–99)
GLUCOSE UR STRIP.AUTO-MCNC: >=1000 MG/DL
HCT VFR BLD AUTO: 44.4 % (ref 40.5–52.5)
HGB BLD-MCNC: 14.7 G/DL (ref 13.5–17.5)
HGB UR QL STRIP.AUTO: NEGATIVE
KETONES UR STRIP.AUTO-MCNC: NEGATIVE MG/DL
LEUKOCYTE ESTERASE UR QL STRIP.AUTO: NEGATIVE
LIPASE SERPL-CCNC: 12 U/L (ref 13–60)
LYMPHOCYTES # BLD: 1.6 K/UL (ref 1–5.1)
LYMPHOCYTES NFR BLD: 23.2 %
MCH RBC QN AUTO: 26.7 PG (ref 26–34)
MCHC RBC AUTO-ENTMCNC: 33 G/DL (ref 31–36)
MCV RBC AUTO: 80.8 FL (ref 80–100)
MONOCYTES # BLD: 0.6 K/UL (ref 0–1.3)
MONOCYTES NFR BLD: 8.9 %
NEUTROPHILS # BLD: 4.6 K/UL (ref 1.7–7.7)
NEUTROPHILS NFR BLD: 65.8 %
NITRITE UR QL STRIP.AUTO: NEGATIVE
PERFORMED ON: ABNORMAL
PH UR STRIP.AUTO: 5.5 [PH] (ref 5–8)
PLATELET # BLD AUTO: 213 K/UL (ref 135–450)
PMV BLD AUTO: 8.5 FL (ref 5–10.5)
POTASSIUM SERPL-SCNC: 4.9 MMOL/L (ref 3.5–5.1)
PROT SERPL-MCNC: 6.9 G/DL (ref 6.4–8.2)
PROT UR STRIP.AUTO-MCNC: NEGATIVE MG/DL
RBC # BLD AUTO: 5.5 M/UL (ref 4.2–5.9)
SODIUM SERPL-SCNC: 138 MMOL/L (ref 136–145)
SP GR UR STRIP.AUTO: 1.02 (ref 1–1.03)
UA COMPLETE W REFLEX CULTURE PNL UR: ABNORMAL
UA DIPSTICK W REFLEX MICRO PNL UR: ABNORMAL
URN SPEC COLLECT METH UR: ABNORMAL
UROBILINOGEN UR STRIP-ACNC: 0.2 E.U./DL
WBC # BLD AUTO: 7.1 K/UL (ref 4–11)

## 2025-07-21 PROCEDURE — 85652 RBC SED RATE AUTOMATED: CPT

## 2025-07-21 PROCEDURE — 83690 ASSAY OF LIPASE: CPT

## 2025-07-21 PROCEDURE — 74177 CT ABD & PELVIS W/CONTRAST: CPT

## 2025-07-21 PROCEDURE — G0378 HOSPITAL OBSERVATION PER HR: HCPCS

## 2025-07-21 PROCEDURE — 71046 X-RAY EXAM CHEST 2 VIEWS: CPT

## 2025-07-21 PROCEDURE — 96375 TX/PRO/DX INJ NEW DRUG ADDON: CPT

## 2025-07-21 PROCEDURE — 99285 EMERGENCY DEPT VISIT HI MDM: CPT

## 2025-07-21 PROCEDURE — 6360000004 HC RX CONTRAST MEDICATION: Performed by: EMERGENCY MEDICINE

## 2025-07-21 PROCEDURE — 96376 TX/PRO/DX INJ SAME DRUG ADON: CPT

## 2025-07-21 PROCEDURE — 96374 THER/PROPH/DIAG INJ IV PUSH: CPT

## 2025-07-21 PROCEDURE — 86140 C-REACTIVE PROTEIN: CPT

## 2025-07-21 PROCEDURE — 85025 COMPLETE CBC W/AUTO DIFF WBC: CPT

## 2025-07-21 PROCEDURE — 81003 URINALYSIS AUTO W/O SCOPE: CPT

## 2025-07-21 PROCEDURE — 2580000003 HC RX 258: Performed by: EMERGENCY MEDICINE

## 2025-07-21 PROCEDURE — 80053 COMPREHEN METABOLIC PANEL: CPT

## 2025-07-21 PROCEDURE — 96361 HYDRATE IV INFUSION ADD-ON: CPT

## 2025-07-21 PROCEDURE — 6360000002 HC RX W HCPCS: Performed by: EMERGENCY MEDICINE

## 2025-07-21 RX ORDER — MORPHINE SULFATE 4 MG/ML
4 INJECTION, SOLUTION INTRAMUSCULAR; INTRAVENOUS ONCE
Status: COMPLETED | OUTPATIENT
Start: 2025-07-21 | End: 2025-07-21

## 2025-07-21 RX ORDER — POTASSIUM CHLORIDE 1500 MG/1
40 TABLET, EXTENDED RELEASE ORAL PRN
Status: DISCONTINUED | OUTPATIENT
Start: 2025-07-21 | End: 2025-07-22 | Stop reason: HOSPADM

## 2025-07-21 RX ORDER — MAGNESIUM SULFATE IN WATER 40 MG/ML
2000 INJECTION, SOLUTION INTRAVENOUS PRN
Status: DISCONTINUED | OUTPATIENT
Start: 2025-07-21 | End: 2025-07-22 | Stop reason: HOSPADM

## 2025-07-21 RX ORDER — SODIUM CHLORIDE 0.9 % (FLUSH) 0.9 %
5-40 SYRINGE (ML) INJECTION PRN
Status: DISCONTINUED | OUTPATIENT
Start: 2025-07-21 | End: 2025-07-22 | Stop reason: HOSPADM

## 2025-07-21 RX ORDER — SODIUM CHLORIDE 0.9 % (FLUSH) 0.9 %
5-40 SYRINGE (ML) INJECTION EVERY 12 HOURS SCHEDULED
Status: DISCONTINUED | OUTPATIENT
Start: 2025-07-22 | End: 2025-07-22 | Stop reason: HOSPADM

## 2025-07-21 RX ORDER — ONDANSETRON 2 MG/ML
4 INJECTION INTRAMUSCULAR; INTRAVENOUS ONCE
Status: COMPLETED | OUTPATIENT
Start: 2025-07-21 | End: 2025-07-21

## 2025-07-21 RX ORDER — IOPAMIDOL 755 MG/ML
75 INJECTION, SOLUTION INTRAVASCULAR
Status: COMPLETED | OUTPATIENT
Start: 2025-07-21 | End: 2025-07-21

## 2025-07-21 RX ORDER — ONDANSETRON 4 MG/1
4 TABLET, ORALLY DISINTEGRATING ORAL EVERY 8 HOURS PRN
Status: DISCONTINUED | OUTPATIENT
Start: 2025-07-21 | End: 2025-07-22 | Stop reason: HOSPADM

## 2025-07-21 RX ORDER — SODIUM CHLORIDE 9 MG/ML
INJECTION, SOLUTION INTRAVENOUS PRN
Status: DISCONTINUED | OUTPATIENT
Start: 2025-07-21 | End: 2025-07-22 | Stop reason: HOSPADM

## 2025-07-21 RX ORDER — 0.9 % SODIUM CHLORIDE 0.9 %
1000 INTRAVENOUS SOLUTION INTRAVENOUS ONCE
Status: COMPLETED | OUTPATIENT
Start: 2025-07-21 | End: 2025-07-21

## 2025-07-21 RX ORDER — MORPHINE SULFATE 4 MG/ML
4 INJECTION, SOLUTION INTRAMUSCULAR; INTRAVENOUS
Status: DISCONTINUED | OUTPATIENT
Start: 2025-07-21 | End: 2025-07-22 | Stop reason: SDUPTHER

## 2025-07-21 RX ORDER — ACETAMINOPHEN 650 MG/1
650 SUPPOSITORY RECTAL EVERY 6 HOURS PRN
Status: DISCONTINUED | OUTPATIENT
Start: 2025-07-21 | End: 2025-07-22 | Stop reason: HOSPADM

## 2025-07-21 RX ORDER — ENOXAPARIN SODIUM 100 MG/ML
40 INJECTION SUBCUTANEOUS DAILY
Status: DISCONTINUED | OUTPATIENT
Start: 2025-07-22 | End: 2025-07-22 | Stop reason: HOSPADM

## 2025-07-21 RX ORDER — POLYETHYLENE GLYCOL 3350 17 G/17G
17 POWDER, FOR SOLUTION ORAL DAILY PRN
Status: DISCONTINUED | OUTPATIENT
Start: 2025-07-21 | End: 2025-07-22 | Stop reason: HOSPADM

## 2025-07-21 RX ORDER — MORPHINE SULFATE 2 MG/ML
2 INJECTION, SOLUTION INTRAMUSCULAR; INTRAVENOUS
Status: DISCONTINUED | OUTPATIENT
Start: 2025-07-21 | End: 2025-07-22 | Stop reason: SDUPTHER

## 2025-07-21 RX ORDER — ONDANSETRON 2 MG/ML
4 INJECTION INTRAMUSCULAR; INTRAVENOUS EVERY 6 HOURS PRN
Status: DISCONTINUED | OUTPATIENT
Start: 2025-07-21 | End: 2025-07-22 | Stop reason: HOSPADM

## 2025-07-21 RX ORDER — POTASSIUM CHLORIDE 7.45 MG/ML
10 INJECTION INTRAVENOUS PRN
Status: DISCONTINUED | OUTPATIENT
Start: 2025-07-21 | End: 2025-07-22 | Stop reason: HOSPADM

## 2025-07-21 RX ORDER — AMLODIPINE BESYLATE 10 MG/1
10 TABLET ORAL DAILY
COMMUNITY

## 2025-07-21 RX ORDER — ACETAMINOPHEN 325 MG/1
650 TABLET ORAL EVERY 6 HOURS PRN
Status: DISCONTINUED | OUTPATIENT
Start: 2025-07-21 | End: 2025-07-22 | Stop reason: HOSPADM

## 2025-07-21 RX ORDER — MORPHINE SULFATE 4 MG/ML
8 INJECTION, SOLUTION INTRAMUSCULAR; INTRAVENOUS
Status: DISCONTINUED | OUTPATIENT
Start: 2025-07-21 | End: 2025-07-21

## 2025-07-21 RX ADMIN — ONDANSETRON 4 MG: 2 INJECTION, SOLUTION INTRAMUSCULAR; INTRAVENOUS at 20:31

## 2025-07-21 RX ADMIN — MORPHINE SULFATE 4 MG: 4 INJECTION, SOLUTION INTRAMUSCULAR; INTRAVENOUS at 22:59

## 2025-07-21 RX ADMIN — MORPHINE SULFATE 4 MG: 4 INJECTION, SOLUTION INTRAMUSCULAR; INTRAVENOUS at 20:31

## 2025-07-21 RX ADMIN — SODIUM CHLORIDE 1000 ML: 0.9 INJECTION, SOLUTION INTRAVENOUS at 20:35

## 2025-07-21 RX ADMIN — MORPHINE SULFATE 4 MG: 4 INJECTION, SOLUTION INTRAMUSCULAR; INTRAVENOUS at 21:06

## 2025-07-21 RX ADMIN — IOPAMIDOL 75 ML: 755 INJECTION, SOLUTION INTRAVENOUS at 19:56

## 2025-07-21 ASSESSMENT — ENCOUNTER SYMPTOMS
SHORTNESS OF BREATH: 0
ABDOMINAL DISTENTION: 1
COUGH: 0
ABDOMINAL PAIN: 1
VOMITING: 1
NAUSEA: 1

## 2025-07-21 ASSESSMENT — PAIN SCALES - GENERAL
PAINLEVEL_OUTOF10: 10
PAINLEVEL_OUTOF10: 6
PAINLEVEL_OUTOF10: 10
PAINLEVEL_OUTOF10: 10
PAINLEVEL_OUTOF10: 2

## 2025-07-21 ASSESSMENT — PAIN DESCRIPTION - ORIENTATION: ORIENTATION: LEFT;RIGHT;UPPER;MID

## 2025-07-21 ASSESSMENT — PAIN DESCRIPTION - LOCATION
LOCATION: ABDOMEN

## 2025-07-21 ASSESSMENT — PAIN DESCRIPTION - DESCRIPTORS: DESCRIPTORS: SHARP

## 2025-07-21 ASSESSMENT — PAIN - FUNCTIONAL ASSESSMENT: PAIN_FUNCTIONAL_ASSESSMENT: 0-10

## 2025-07-21 NOTE — ED PROVIDER NOTES
Emergency Department Attending Physician Note  Location: [facility] EMERGENCY DEPARTMENT  7/21/2025       Pt Name: Zafar Chong  MRN: [id number]  Birthdate 1960    Date of evaluation: 7/21/2025  Provider: TIM MCFARLAND DO  PCP: Rikki Waddell DO    Note Started: 7:48 PM EDT 7/21/25    CHIEF COMPLAINT  Chief Complaint   Patient presents with    Abdominal Pain     Had stent placed in pancreas. States pain since all across abd. States some episodes of emesis     Nausea       ROOM: P2 --> 9    HISTORY OF PRESENT ILLNESS:  History obtained by patient. Limitations to history : None.     Zafar Chong is a 64 y.o. male with a significant PMHx of DVT, diverticulitis, ulcerative colitis, hypertension, and recent pancreatic stent placed, here in the emergency department today with severe upper abdominal pain, nausea, rigors, and he says \"I have been so weak I have not been out of bed in 3 days.\"  Recent ERCP with Dr. Landon who spoke with patient today, and told him to come to the emergency department.  Denies any cough or congestion.  No dysuria or hematuria.  No falls or injuries.  Patient says he has not really eaten in about 3 days, and says he feels overall just very weak.  Describes \"shaking really bad when I am really cold,\" I suspect he is having rigors.  Says he never had any pain like this before, although he has had multiple abdominal surgeries.  No constipation.  No other concerns today in the emergency department. Patient says his GI doctor is concerned that his GB needs to come out.         On chart review: 7/16/2025:   Nilesh Landon MD Primary    Procedure Laterality Anesthesia   ENDOSCOPIC RETROGRADE CHOLANGIOPANCREATOGRAPHY SPHINCTER/PAPILLOTOMY N/A General   ESOPHAGOGASTRODUODENOSCOPY ULTRASOUND N/A General   ENDOSCOPIC RETROGRADE CHOLANGIOPANCREATOGRAPHY STENT INSERTION              Nursing Notes were all reviewed and agreed with or any disagreements were addressed in the

## 2025-07-22 VITALS
BODY MASS INDEX: 27.21 KG/M2 | HEIGHT: 72 IN | SYSTOLIC BLOOD PRESSURE: 124 MMHG | HEART RATE: 94 BPM | DIASTOLIC BLOOD PRESSURE: 83 MMHG | OXYGEN SATURATION: 92 % | WEIGHT: 200.9 LBS | RESPIRATION RATE: 14 BRPM | TEMPERATURE: 97.9 F

## 2025-07-22 PROBLEM — K52.9 IBD (INFLAMMATORY BOWEL DISEASE): Status: ACTIVE | Noted: 2024-05-02

## 2025-07-22 PROBLEM — Z96.89 PRESENCE OF PANCREATIC DUCT STENT: Status: ACTIVE | Noted: 2025-07-22

## 2025-07-22 LAB
ALBUMIN SERPL-MCNC: 3.3 G/DL (ref 3.4–5)
ALBUMIN/GLOB SERPL: 1 {RATIO} (ref 1.1–2.2)
ALP SERPL-CCNC: 67 U/L (ref 40–129)
ALT SERPL-CCNC: 14 U/L (ref 10–40)
ANION GAP SERPL CALCULATED.3IONS-SCNC: 9 MMOL/L (ref 3–16)
AST SERPL-CCNC: 11 U/L (ref 15–37)
BASOPHILS # BLD: 0 K/UL (ref 0–0.2)
BASOPHILS NFR BLD: 0.3 %
BILIRUB SERPL-MCNC: 0.3 MG/DL (ref 0–1)
BUN SERPL-MCNC: 15 MG/DL (ref 7–20)
CALCIUM SERPL-MCNC: 8.7 MG/DL (ref 8.3–10.6)
CHLORIDE SERPL-SCNC: 103 MMOL/L (ref 99–110)
CO2 SERPL-SCNC: 25 MMOL/L (ref 21–32)
CREAT SERPL-MCNC: 1.2 MG/DL (ref 0.8–1.3)
DEPRECATED RDW RBC AUTO: 17.2 % (ref 12.4–15.4)
EOSINOPHIL # BLD: 0.1 K/UL (ref 0–0.6)
EOSINOPHIL NFR BLD: 1.8 %
GFR SERPLBLD CREATININE-BSD FMLA CKD-EPI: 66 ML/MIN/{1.73_M2}
GLUCOSE BLD-MCNC: 173 MG/DL (ref 70–99)
GLUCOSE BLD-MCNC: 202 MG/DL (ref 70–99)
GLUCOSE SERPL-MCNC: 166 MG/DL (ref 70–99)
HCT VFR BLD AUTO: 39.6 % (ref 40.5–52.5)
HGB BLD-MCNC: 13.2 G/DL (ref 13.5–17.5)
LYMPHOCYTES # BLD: 1.9 K/UL (ref 1–5.1)
LYMPHOCYTES NFR BLD: 25.8 %
MCH RBC QN AUTO: 27 PG (ref 26–34)
MCHC RBC AUTO-ENTMCNC: 33.4 G/DL (ref 31–36)
MCV RBC AUTO: 80.9 FL (ref 80–100)
MONOCYTES # BLD: 0.8 K/UL (ref 0–1.3)
MONOCYTES NFR BLD: 10.8 %
NEUTROPHILS # BLD: 4.5 K/UL (ref 1.7–7.7)
NEUTROPHILS NFR BLD: 61.3 %
PERFORMED ON: ABNORMAL
PERFORMED ON: ABNORMAL
PLATELET # BLD AUTO: 179 K/UL (ref 135–450)
PMV BLD AUTO: 8.9 FL (ref 5–10.5)
POTASSIUM SERPL-SCNC: 4.7 MMOL/L (ref 3.5–5.1)
PROT SERPL-MCNC: 6.5 G/DL (ref 6.4–8.2)
RBC # BLD AUTO: 4.89 M/UL (ref 4.2–5.9)
SODIUM SERPL-SCNC: 137 MMOL/L (ref 136–145)
WBC # BLD AUTO: 7.3 K/UL (ref 4–11)

## 2025-07-22 PROCEDURE — G0378 HOSPITAL OBSERVATION PER HR: HCPCS

## 2025-07-22 PROCEDURE — 6370000000 HC RX 637 (ALT 250 FOR IP): Performed by: STUDENT IN AN ORGANIZED HEALTH CARE EDUCATION/TRAINING PROGRAM

## 2025-07-22 PROCEDURE — 36415 COLL VENOUS BLD VENIPUNCTURE: CPT

## 2025-07-22 PROCEDURE — 99238 HOSP IP/OBS DSCHRG MGMT 30/<: CPT | Performed by: INTERNAL MEDICINE

## 2025-07-22 PROCEDURE — 80053 COMPREHEN METABOLIC PANEL: CPT

## 2025-07-22 PROCEDURE — 96376 TX/PRO/DX INJ SAME DRUG ADON: CPT

## 2025-07-22 PROCEDURE — 85025 COMPLETE CBC W/AUTO DIFF WBC: CPT

## 2025-07-22 PROCEDURE — 6360000002 HC RX W HCPCS: Performed by: STUDENT IN AN ORGANIZED HEALTH CARE EDUCATION/TRAINING PROGRAM

## 2025-07-22 RX ORDER — DEXTROSE MONOHYDRATE 100 MG/ML
INJECTION, SOLUTION INTRAVENOUS CONTINUOUS PRN
Status: DISCONTINUED | OUTPATIENT
Start: 2025-07-22 | End: 2025-07-22 | Stop reason: HOSPADM

## 2025-07-22 RX ORDER — BUDESONIDE 3 MG/1
6 CAPSULE, COATED PELLETS ORAL DAILY
Status: DISCONTINUED | OUTPATIENT
Start: 2025-07-22 | End: 2025-07-22 | Stop reason: HOSPADM

## 2025-07-22 RX ORDER — FAMOTIDINE 20 MG/1
20 TABLET, FILM COATED ORAL 2 TIMES DAILY PRN
Status: DISCONTINUED | OUTPATIENT
Start: 2025-07-22 | End: 2025-07-22 | Stop reason: HOSPADM

## 2025-07-22 RX ORDER — INSULIN LISPRO 100 [IU]/ML
0-8 INJECTION, SOLUTION INTRAVENOUS; SUBCUTANEOUS
Status: DISCONTINUED | OUTPATIENT
Start: 2025-07-22 | End: 2025-07-22 | Stop reason: HOSPADM

## 2025-07-22 RX ORDER — LOSARTAN POTASSIUM AND HYDROCHLOROTHIAZIDE 12.5; 1 MG/1; MG/1
1 TABLET ORAL DAILY
Status: DISCONTINUED | OUTPATIENT
Start: 2025-07-22 | End: 2025-07-22 | Stop reason: SDUPTHER

## 2025-07-22 RX ORDER — BUDESONIDE 3 MG/1
6 CAPSULE, COATED PELLETS ORAL EVERY MORNING
COMMUNITY

## 2025-07-22 RX ORDER — GLUCAGON 1 MG/ML
1 KIT INJECTION PRN
Status: DISCONTINUED | OUTPATIENT
Start: 2025-07-22 | End: 2025-07-22 | Stop reason: HOSPADM

## 2025-07-22 RX ORDER — HYDROCHLOROTHIAZIDE 25 MG/1
12.5 TABLET ORAL DAILY
Status: DISCONTINUED | OUTPATIENT
Start: 2025-07-22 | End: 2025-07-22 | Stop reason: HOSPADM

## 2025-07-22 RX ORDER — AMLODIPINE BESYLATE 5 MG/1
10 TABLET ORAL DAILY
Status: DISCONTINUED | OUTPATIENT
Start: 2025-07-22 | End: 2025-07-22 | Stop reason: HOSPADM

## 2025-07-22 RX ORDER — LOSARTAN POTASSIUM 100 MG/1
100 TABLET ORAL DAILY
Status: DISCONTINUED | OUTPATIENT
Start: 2025-07-22 | End: 2025-07-22 | Stop reason: HOSPADM

## 2025-07-22 RX ORDER — INSULIN GLARGINE 100 [IU]/ML
25 INJECTION, SOLUTION SUBCUTANEOUS EVERY 12 HOURS
Status: DISCONTINUED | OUTPATIENT
Start: 2025-07-22 | End: 2025-07-22 | Stop reason: HOSPADM

## 2025-07-22 RX ORDER — MORPHINE SULFATE 2 MG/ML
2 INJECTION, SOLUTION INTRAMUSCULAR; INTRAVENOUS
Status: DISCONTINUED | OUTPATIENT
Start: 2025-07-22 | End: 2025-07-22 | Stop reason: HOSPADM

## 2025-07-22 RX ORDER — ATORVASTATIN CALCIUM 40 MG/1
40 TABLET, FILM COATED ORAL DAILY
Status: DISCONTINUED | OUTPATIENT
Start: 2025-07-22 | End: 2025-07-22 | Stop reason: HOSPADM

## 2025-07-22 RX ORDER — MORPHINE SULFATE 4 MG/ML
4 INJECTION, SOLUTION INTRAMUSCULAR; INTRAVENOUS
Status: DISCONTINUED | OUTPATIENT
Start: 2025-07-22 | End: 2025-07-22 | Stop reason: HOSPADM

## 2025-07-22 RX ORDER — DIAZEPAM 5 MG/1
10 TABLET ORAL EVERY 12 HOURS PRN
Status: DISCONTINUED | OUTPATIENT
Start: 2025-07-22 | End: 2025-07-22 | Stop reason: HOSPADM

## 2025-07-22 RX ORDER — ASPIRIN 81 MG/1
81 TABLET, CHEWABLE ORAL DAILY
Status: DISCONTINUED | OUTPATIENT
Start: 2025-07-22 | End: 2025-07-22 | Stop reason: HOSPADM

## 2025-07-22 RX ADMIN — BUDESONIDE 6 MG: 3 CAPSULE, GELATIN COATED ORAL at 08:12

## 2025-07-22 RX ADMIN — ACETAMINOPHEN 650 MG: 325 TABLET ORAL at 08:11

## 2025-07-22 RX ADMIN — ASPIRIN 81 MG: 81 TABLET, CHEWABLE ORAL at 08:11

## 2025-07-22 RX ADMIN — MORPHINE SULFATE 4 MG: 4 INJECTION, SOLUTION INTRAMUSCULAR; INTRAVENOUS at 00:38

## 2025-07-22 RX ADMIN — Medication 3 MG: at 00:36

## 2025-07-22 RX ADMIN — HYDROCHLOROTHIAZIDE 12.5 MG: 25 TABLET ORAL at 08:11

## 2025-07-22 RX ADMIN — INSULIN LISPRO 2 UNITS: 100 INJECTION, SOLUTION INTRAVENOUS; SUBCUTANEOUS at 11:51

## 2025-07-22 RX ADMIN — AMLODIPINE BESYLATE 10 MG: 5 TABLET ORAL at 08:11

## 2025-07-22 RX ADMIN — INSULIN GLARGINE 25 UNITS: 100 INJECTION, SOLUTION SUBCUTANEOUS at 08:12

## 2025-07-22 RX ADMIN — LOSARTAN POTASSIUM 100 MG: 100 TABLET, FILM COATED ORAL at 08:11

## 2025-07-22 RX ADMIN — ATORVASTATIN CALCIUM 40 MG: 40 TABLET, FILM COATED ORAL at 08:11

## 2025-07-22 ASSESSMENT — PAIN DESCRIPTION - DESCRIPTORS
DESCRIPTORS: ACHING
DESCRIPTORS: ACHING

## 2025-07-22 ASSESSMENT — PAIN SCALES - GENERAL
PAINLEVEL_OUTOF10: 6
PAINLEVEL_OUTOF10: 2
PAINLEVEL_OUTOF10: 8
PAINLEVEL_OUTOF10: 8
PAINLEVEL_OUTOF10: 2

## 2025-07-22 ASSESSMENT — PAIN - FUNCTIONAL ASSESSMENT
PAIN_FUNCTIONAL_ASSESSMENT: ACTIVITIES ARE NOT PREVENTED
PAIN_FUNCTIONAL_ASSESSMENT: PREVENTS OR INTERFERES SOME ACTIVE ACTIVITIES AND ADLS

## 2025-07-22 ASSESSMENT — PAIN DESCRIPTION - LOCATION
LOCATION: HEAD
LOCATION: ABDOMEN

## 2025-07-22 ASSESSMENT — PAIN DESCRIPTION - ORIENTATION: ORIENTATION: RIGHT;LEFT;MID

## 2025-07-22 NOTE — PROGRESS NOTES
4 Eyes Skin Assessment     NAME:  Zafar Chong  YOB: 1960  MEDICAL RECORD NUMBER:  [id number]    The patient is being assessed for  Admission    I agree that at least one RN has performed a thorough Head to Toe Skin Assessment on the patient. ALL assessment sites listed below have been assessed.      Areas assessed by both nurses:    Head, Face, Ears, Shoulders, Back, Chest, Arms, Elbows, Hands, Sacrum. Buttock, Coccyx, Ischium, Legs. Feet and Heels, and Under Medical Devices         Does the Patient have a Wound? No noted wound(s)       Mike Prevention initiated by RN: No  Wound Care Orders initiated by RN: No    For hospital-acquired stage 1 & 2 and ALL Stage 3,4, Unstageable, DTI, NWPT, and Complex wounds: place order “IP Wound Care/Ostomy Nurse Eval and Treat” by RN under : No    New Ostomies, if present place, Ostomy referral order under : No     Nurse 1 eSignature: Electronically signed by Cynthia Perrin RN on 7/22/25 at 4:59 AM EDT    **SHARE this note so that the co-signing nurse can place an eSignature**    Nurse 2 eSignature: Electronically signed by Diana Ventura RN on 7/22/25 at 5:02 AM EDT

## 2025-07-22 NOTE — FLOWSHEET NOTE
07/21/25 2330   Vital Signs   Temp 97.9 °F (36.6 °C)   Temp Source Oral   Pulse 66   Heart Rate Source Monitor   Respirations 16   /80   MAP (Calculated) 96   BP Location Left upper arm   BP Method Automatic   Patient Position Sitting   Pain Assessment   Pain Assessment None - Denies Pain   Pain Level 2   Opioid-Induced Sedation   POSS Score 1   Oxygen Therapy   SpO2 97 %   O2 Device None (Room air)   Height and Weight   Height 1.829 m (6')   Weight - Scale 91.1 kg (200 lb 14.4 oz)   BSA (Calculated - sq m) 2.15 sq meters   BMI (Calculated) 27.3     Admission received from ED-9, transported to [facility] room 219, Weight taken and CHG wipes done. Admission questions and Shift assessment completed. The pt is A&O x 4,  The pt denies any further needs at this time. The pt call light and personal items are with in reach. Will continue to monitor.

## 2025-07-22 NOTE — DISCHARGE SUMMARY
Name:  Zafar Chong  Room:  0219/0219-01  MRN:    [id number]    Discharge Summary      This discharge summary is in conjunction with a complete physical exam done on the day of discharge.      Discharging Physician: ESRGEY WEINER MD      Admit: 7/21/2025  Discharge:  7/22/2025     Diagnoses this Admission    Principal Problem:    Abdominal pain  Resolved Problems:    * No resolved hospital problems. *      Procedures (Please Review Full Report for Details)  none    Consults    IP CONSULT TO GI      HPI:    64 y.o. male who presented to [facility] with course of abdominal pain.  Patient reports that he has chronic abdominal pain due to 8 prior abdominal surgeries and a lot of scar tissue.  He states usually he requires higher doses of opioids for his pain.  He was discharged on [facility] at home after his pancreatic stent placement but states that he was having to take higher doses to alleviate his pain.  He states noticing at times having chills and rigors over the past few days. He had a small volume bowel movement this evening.  He is denying any diarrhea states having 1 episode of emesis.  Pain is not reproducible on palpation and states it is deeper inside. Otherwise denying fever, shortness of breath, cough, hematochezia.     PMHx significant for ulcerative colitis, hypertension, anxiety, diabetes mellitus, GERD    Physical Exam at Discharge:  /61   Pulse 50   Temp 97.7 °F (36.5 °C) (Oral)   Resp 16   Ht 1.829 m (6')   Wt 91.1 kg (200 lb 14.4 oz)   SpO2 92%   BMI 27.25 kg/m²     Physical Exam  Constitutional:       Appearance: He is well-developed.   HENT:      Head: Normocephalic.   Eyes:      Conjunctiva/sclera: Conjunctivae normal.      Pupils: Pupils are equal, round, and reactive to light.   Neck:      Thyroid: No thyroid mass or thyromegaly.      Vascular: No carotid bruit or JVD.      Trachea: Trachea normal.   Cardiovascular:      Rate and Rhythm: Normal rate and

## 2025-07-22 NOTE — DISCHARGE INSTRUCTIONS
Your information:  Name: Zafar Chong  : 1960    Your instructions:    Follow-up with PCP in 1 week  Follow-up with your GI doctor in 1 week      What to do after you leave the hospital:    Recommended diet: low fat, low cholesterol diet    Recommended activity: activity as tolerated        The following personal items were collected during your admission and were returned to you:    Belongings  Dental Appliances: None  Vision - Corrective Lenses: Eyeglasses  Hearing Aid: None  Clothing: Footwear, Pajamas, Shirt  Jewelry: None  Body Piercings Removed: No  Electronic Devices: Cell Phone  Weapons (Notify Protective Services/Security): None  Other Valuables: At home  Home Medications: None  Valuables Given To: Patient  Provide Name(s) of Who Valuable(s) Were Given To: Patient  Patient approves for provider to speak to responsible person post operatively: Yes    Information obtained by:  By signing below, I understand that if any problems occur once I leave the hospital I am to contact PCP.  I understand and acknowledge receipt of the instructions indicated above.

## 2025-07-22 NOTE — ED NOTES
Med list reviewed with patient.  Dosing and last doses.  Pt states he hasn't taken any medications the last two days.  Devonte posey

## 2025-07-22 NOTE — ED NOTES
Zafar Chong is a 64 y.o. male admitted for  Principal Problem:    Abdominal pain  Resolved Problems:    * No resolved hospital problems. *  .   Patient Home via self with   Chief Complaint   Patient presents with    Abdominal Pain     Had stent placed in pancreas. States pain since all across abd. States some episodes of emesis     Nausea   .  Patient is alert and Person, Place, and Time  Patient's baseline mobility: Baseline Mobility: Independent   Code Status: Prior   Cardiac Rhythm:       Is patient on baseline Oxygen: no how many Liters:   Abnormal Assessment Findings:  abdominal pain    Isolation: None      NIH Score:    C-SSRS: Risk of Suicide: No Risk  Bedside swallow:        Active LDA's:   Peripheral IV 07/21/25 Proximal;Right Forearm (Active)   Site Assessment Clean, dry & intact 07/21/25 1923   Line Status Blood return noted;Flushed;Normal saline locked;Specimen collected 07/21/25 1923   Phlebitis Assessment No symptoms 07/21/25 1923   Infiltration Assessment 0 07/21/25 1923   Dressing Status New dressing applied;Clean, dry & intact 07/21/25 1923   Dressing Type Transparent 07/21/25 1923   Dressing Intervention New 07/21/25 1923     Patient admitted with a : no   PIV right forearm.  Saline locked    Family/Caregiver Present no Any Concerns: no   Restraints no  Sitter no         Vitals:      Vitals:    07/21/25 1911 07/21/25 1914 07/21/25 2039   BP:  (!) 157/95    Pulse:  88 70   Resp:  18    Temp: 97.9 °F (36.6 °C)     TempSrc: Oral     SpO2:  96%    Weight: 89.8 kg (198 lb)     Height: 1.829 m (6')         Last documented pain score (0-10 scale) Pain Level: 6  Pain medication administered Yes- see MAR.    Pertinent or High Risk Medications/Drips: No.    Pending Blood Product Administration: no    Abnormal labs:   Abnormal Labs Reviewed   CBC WITH AUTO DIFFERENTIAL - Abnormal; Notable for the following components:       Result Value    RDW 17.1 (*)     All other components within normal limits

## 2025-07-22 NOTE — H&P
[facility] Medicine History & Physical    V 5.1    Date of Admission: 7/21/2025    Date of Service:  Pt seen/examined on 7/21/2025 at 2350    []Admitted to Inpatient with expected LOS greater than two midnights due to medical therapy.  [x]Placed in Observation status.    Assessment/Plan:      Acute on chronic abdominal pain: Presenting with ongoing abdominal pain.  This is acute on chronic and states it is not reproducible and exacerbated after procedure.  He recently had ERCP with Dr. Sebastian who had told the patient to come to the ER due to patient reporting abdominal pain and rigors. Labs at this time reassuring. CT abdomen pelvis shows no acute changes the stent is in place.  WBC WNL.  -Discussed initiating Lyrica or gabapentin or duloxetine due to chronic neuropathic abdominal pain, patient declined  -As needed morphine ordered  -GI called recommended admission  -N.p.o. at midnight  -Zofran for nausea  -ESR and CRP ordered    Diabetes mellitus: Basal and sliding scale insulin ordered    Hypertension: Continue home losartan and hydrochlorothiazide    GERD: Continue home famotidine    Anxiety: Continue home Valium    Ulcerative colitis: Continue home budesonide:    Discussed management and the need for Hospitalization of the patient w/ the Emergency Department Provider: Shakira Cardona DO      Chief Admission Complaint: Abdominal pain    Presenting Admission History:      64 y.o. male who presented to [facility] with course of abdominal pain.  Patient reports that he has chronic abdominal pain due to 8 prior abdominal surgeries and a lot of scar tissue.  He states usually he requires higher doses of opioids for his pain.  He was discharged on [place] at home after his pancreatic stent placement but states that he was having to take higher doses to alleviate his pain.  He states noticing at times having chills and rigors over the past few days. He had a small volume bowel movement this evening.  He

## 2025-07-22 NOTE — CARE COORDINATION
DISCHARGE PLANNING:    Per chart review, patient has no SW/CM needs at this time.  If needs arise, please consult SW/CM.  Patient is from home, independent at baseline.  Patient has an active primary care physician with the last visit on 11/11/24.  Patient has active health insurance.  [place] home no needs.    #[phone]  Electronically signed by Radha Paiz RN on 7/22/2025 at 9:52 AM

## 2025-07-22 NOTE — FLOWSHEET NOTE
07/22/25 0739   Handoff   Communication Given Shift Handoff   Handoff Given To Inessa COWAN   Handoff Received From Cynthia COWAN   Handoff Communication Face to Face;At bedside   Time Handoff Given 0718   End of Shift Check Performed Yes     Patient is stable. All end of shift needs have been met. No further assistance needed at this time.

## 2025-07-22 NOTE — PROGRESS NOTES
Pt tolerated diet. Pt given written and verbal discharge instructions. Pt indicated understanding of home medication instructions. Pt packed own belongings. Pt taken down to family car via wheelchair.

## 2025-07-22 NOTE — ED NOTES
[facility] at this time after clarifying reason for consult with Dr. Cardona. Dr. Cardona states reason for consult is \"abdominal pain post pancreatic stent placement\". Awaiting call back from Dr. Sotelo at this time.    2215 - Dr. Sotelo (GI) returns call, speaking to Dr. Cardona at this time.

## 2025-07-22 NOTE — PROGRESS NOTES
AM assessment completed, see flow sheet. Pt is alert and oriented. Vital signs are WNL. Respirations are even & easy on RA. Pt complaints voiced of headache 8/10 medicated see MAR.  Pt denies needs at this time. SR up x 2, and bed in low position. Call light is within reach.

## 2025-07-23 ENCOUNTER — CARE COORDINATION (OUTPATIENT)
Dept: CASE MANAGEMENT | Age: 65
End: 2025-07-23

## 2025-07-23 ENCOUNTER — TELEPHONE (OUTPATIENT)
Dept: FAMILY MEDICINE CLINIC | Age: 65
End: 2025-07-23

## 2025-07-23 ENCOUNTER — TELEPHONE (OUTPATIENT)
Dept: CARDIOLOGY CLINIC | Age: 65
End: 2025-07-23

## 2025-07-23 NOTE — TELEPHONE ENCOUNTER
Pt called the office stating that he is feeling lightheaded, tired, dizzy, felt like body was weak, and \"dont feel right\". Pt stated that his BP was 87/69. Pt stated that he has been outside working but when he went inside and sat down, he is starting to feel a little. Pt stated that he takes losartan and carvedilol every morning. Pt stated that he is going to keep a log of his BP. Pt was advised to go to the ED and he stated that he was just released from [facility] ED and his BP was around the same numbers in the ED and nothing was said about his BP. Pt stated what while in the ED he has a ct scan, chest xray and blood work

## 2025-07-23 NOTE — CARE COORDINATION
Care Transitions Note    Initial Call - Call within 2 business days of discharge: Yes    Attempted to reach patient for transitions of care follow up. Unable to reach patient.    Outreach Attempts:   HIPAA compliant voicemail left for patient.     Patient: Zafar Chong    Patient : 1960   MRN: [id number]    Reason for Admission: Abd pain pancreatic stent  Discharge Date: 25  RURS: No data recorded  Last Discharge Facility       Date Complaint Diagnosis Description Type Department Provider    25 Abdominal Pain; Nausea Presence of pancreatic duct stent ... ED to [place] (Discharged[place] [facility] [place] Mert Mccauley MD; Brendan, ...            Was this an external facility discharge? No    Follow Up Appointment:   Patient does not have a follow up appointment scheduled at time of call. CTN unable to reach patient . Per chart review CHELSEA Miles reached out to patient and patient reported \"patient states that he does not need to follow up with our office. Patient states that he is following up with endocrinology 25 and pain management 25. \"  Future Appointments         Provider Specialty Dept Phone    10/21/2025 9:20 AM Anne Harrington MD Endocrinology [phone]    2025 10:00 AM Vandana Gonsalves DO Cardiology [phone]    12/15/2025 8:30 AM Rikki Waddell DO Family Medicine [phone]            Plan for follow-up on next business day.      MAL Quinteros, RN, [facility]  Care Transition Nurse  [phone] mobile

## 2025-07-23 NOTE — TELEPHONE ENCOUNTER
Care Transitions Initial Follow Up Call    Outreach made within 2 business days of discharge: Yes    Patient: Zafar Chong Patient : 1960   MRN: [id number]  Reason for Admission: Abdominal pain  Discharge Date: 25       Spoke with: patient states that he does not need to follow up with our office.  Patient states that he is following up with endocrinology 25 and pain management 25.      Discharge department/facility: [facility] Interactive Patient Contact:  Was patient able to fill all prescriptions: Yes  Was patient instructed to bring all medications to the follow-up visit: Yes  Is patient taking all medications as directed in the discharge summary? Yes  Does patient understand their discharge instructions: Yes  Does patient have questions or concerns that need addressed prior to 7-14 day follow up office visit: no        Scheduled appointment with PCP within 7-14 days    Follow Up  Future Appointments   Date Time Provider Department Center   10/21/2025  9:20 AM Anne Harrington MD [facility]   2025 10:00 AM Vandana Gonsalves DO [facility]   12/15/2025  8:30 AM Rikki Waddell DO [facility] DEP       Renetta Miles LPN

## 2025-07-24 ENCOUNTER — CARE COORDINATION (OUTPATIENT)
Dept: CASE MANAGEMENT | Age: 65
End: 2025-07-24

## 2025-07-24 NOTE — TELEPHONE ENCOUNTER
Patient report he is feeling better today. These symptoms intermittent come and go. /81 today. He reports fluctuations in blood pressure occurring in last 2-3 month. He states he typically knows when episodes will occur as he is symptomatic upon awakening. 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 He reports he feels a funny fatigued feeling, have to sit down and rest prior to subsiding usually 1 hr. He reports he drinks 12 glasses of water a day so he does not feel like dehydration is a factor.

## 2025-07-24 NOTE — CARE COORDINATION
Care Transitions Note    Initial Call - Call within 2 business days of discharge: Yes    Attempted to reach patient for transitions of care follow up. Unable to reach patient.    Outreach Attempts:   Patient declined calls. CTN encouraged patient to reach out to provider should any needs arise.     Patient: Zafar Chong    Patient : 1960   MRN: [id number]    Reason for Admission: Abd pain   Discharge Date: 25  RURS: No data recorded  Last Discharge Facility       Date Complaint Diagnosis Description Type Department Provider    25 Abdominal Pain; Nausea Presence of pancreatic duct stent ... ED to Hosp-Admission (Discharged) (ADMITTED) [facility] [place] Mert Mccauley MD; Brendan, ...            Was this an external facility discharge? No    Follow Up Appointment:   Patient does not have a follow up appointment scheduled at time of call. Per chart review PCP office spoke with patient and he declined HFU apt.   Future Appointments         Provider Specialty Dept Phone    10/21/2025 9:20 AM Anne Harrington MD Endocrinology [phone]    2025 10:00 AM Vandana Gonsalves DO Cardiology [phone]    12/15/2025 8:30 AM Rikki Waddell DO Family Medicine [phone]            No further follow-up call indicated     Ofelia Gonzalez, MSN, RN, [facility]  Care Transition Nurse  [phone] mobile

## 2025-07-26 PROBLEM — Z86.73 HISTORY OF STROKE: Status: ACTIVE | Noted: 2021-02-12

## 2025-07-27 DIAGNOSIS — K21.9 GASTROESOPHAGEAL REFLUX DISEASE WITHOUT ESOPHAGITIS: ICD-10-CM

## 2025-07-27 LAB
SEND OUT REPORT: NORMAL
TEST NAME: NORMAL

## 2025-07-28 RX ORDER — FAMOTIDINE 20 MG/1
TABLET, FILM COATED ORAL
Qty: 180 TABLET | Refills: 1 | Status: SHIPPED | OUTPATIENT
Start: 2025-07-28

## 2025-07-28 NOTE — TELEPHONE ENCOUNTER
Called pt today to attempt to schedule appt  w/ NP for next available and pt state that he is going to be leaving the state in 3 weeks.  Pt was offered an appt w/ [facility] for 08/01 and pt stated he will not be in the state then.  Pt would like to speak w/ a nurse or VSP regarding the medications.  Pt stated that yesterday is BP /66.  Pt stated that he feels he is taking too much BP medication and feels is should be reduced, but would like VSPs advice.  Pt can be contacted at  [phone].

## 2025-07-28 NOTE — TELEPHONE ENCOUNTER
Front please contact patient and schedule with soonest available NP per VSP to discuss medications.

## 2025-07-28 NOTE — TELEPHONE ENCOUNTER
.Refill Request     CONFIRM preferred pharmacy with the patient.    If Mail Order Rx - Pend for 90 day refill.      Last Seen: Last Seen Department: 5/12/2025  Last Seen by PCP: 5/12/2025    Last Written: 12-19-24 180 with 1     If no future appointment scheduled:  Review the last OV with PCP and review information for follow-up visit,  Route STAFF MESSAGE with patient name to the  Pool for scheduling with the following information:            -  Timing of next visit           -  Visit type ie Physical, OV, etc           -  Diagnoses/Reason ie. COPD, HTN - Do not use MEDICATION, Follow-up or CHECK UP - Give reason for visit      Next Appointment:   Future Appointments   Date Time Provider Department Rosie   10/21/2025  9:20 AM Anne Harrington MD AND ENDO [facility]   11/6/2025 10:00 AM Vandana Gonsalves DO [facility]   12/15/2025  8:30 AM Rikki Waddell DO [facility] DEP       Message sent to  to schedule appt with patient?  NO      Requested Prescriptions     Pending Prescriptions Disp Refills    famotidine (PEPCID) 20 MG tablet [Pharmacy Med Name: FAMOTIDINE 20 MG TABLET] 180 tablet 1     Sig: TAKE 1 TABLET BY MOUTH TWICE A DAY AS NEEDED FOR HEARTBURN

## 2025-07-28 NOTE — TELEPHONE ENCOUNTER
Called and spoke with patient. He is going to be moving out of state in 3 weeks r/t divorce. He is agreeable to office visit with DD NP to discuss medications prior to leaving. He VU to time,date, and locaiton of appt with DD NP. Request WebPT message be sent.

## 2025-07-29 ENCOUNTER — TELEPHONE (OUTPATIENT)
Dept: FAMILY MEDICINE CLINIC | Age: 65
End: 2025-07-29

## 2025-07-29 NOTE — TELEPHONE ENCOUNTER
Patient called in stating he was just seen at Dr Shay's office for pain management. His next appointment is not until 8/12/25.  Dr hSay instructed danialt to call PCP office for a filler refill of Hydrocodone Until patient can be seen/ results of the drug panel come back.    Patient stated that Dr Shay recommended an increase in Hydrocodone to 7.5mg.    Please advise.

## 2025-07-29 NOTE — TELEPHONE ENCOUNTER
I told the patient recently that the last opioid prescription would be the last that I could give him from his office.  He needs to get all of further refills from his pain management doctor.  It is not appropriate to get a controlled substance from me when he is already seeing a pain doctor.  He will have to wait until his next appointment or consider calling that office once the drug panel comes back.  The drug panel usually only takes 5 to 7 days.

## 2025-07-30 LAB
CEA FLD-MCNC: NORMAL NG/ML
SPECIMEN SOURCE: NORMAL

## 2025-07-30 NOTE — TELEPHONE ENCOUNTER
Patient advised    He stated that he will find another provider instead of Dr. Waddell he stated that a Dr that is able to let his patients sit there in pain is not much of a doctor to his him.     He stated that Dr. Shay recommended a 5-7 day supply of medication until his urine comes back and then Dr. Shay can fill and prescribe the medication.     CASPER Waddell

## 2025-08-05 ENCOUNTER — TELEPHONE (OUTPATIENT)
Dept: FAMILY MEDICINE CLINIC | Age: 65
End: 2025-08-05

## 2025-08-22 RX ORDER — BLOOD SUGAR DIAGNOSTIC
100 STRIP MISCELLANEOUS DAILY
Qty: 100 STRIP | Refills: 5 | Status: SHIPPED | OUTPATIENT
Start: 2025-08-22

## (undated) DEVICE — Device: Brand: BALLOON3

## (undated) DEVICE — SOLUTION IRRIG 1000ML STRL H2O USP PLAS POUR BTL

## (undated) DEVICE — ENDOSCOPIC KIT 1.1+ 6 FT 2 GWN AAMI LEVEL 3

## (undated) DEVICE — FORCEPS BX L240CM JAW DIA2.8MM L CAP W/ NDL MIC MESH TOOTH

## (undated) DEVICE — MASK O2 ADULT POM PROCEDURAL OXYGEN MASK 7FT O2 TUBING 10FT

## (undated) DEVICE — CANNULATING SPHINCTEROTOME: Brand: TRUETOME JAG 44

## (undated) DEVICE — CONMED SCOPE SAVER BITE BLOCK, 20X27 MM: Brand: SCOPE SAVER

## (undated) DEVICE — BULB SYRINGE & TIP PROTECTOR: Brand: LSL HEALTHCARE

## (undated) DEVICE — FORCEPS BX L240CM WRK CHN 2.8MM STD CAP W/ NDL MIC MESH

## (undated) DEVICE — SYRINGE MED 10ML SLIP TIP BLNT FILL AND LUERLOCK DISP

## (undated) DEVICE — BW-412T DISP COMBO CLEANING BRUSH: Brand: SINGLE USE COMBINATION CLEANING BRUSH

## (undated) DEVICE — AIR/WATER CLEANING ADAPTER FOR OLYMPUS® GI ENDOSCOPE: Brand: BULLDOG®

## (undated) DEVICE — SINGLE USE AIR/WATER, SUCTION AND BIOPSY VALVES SET: Brand: ORCAPOD™

## (undated) DEVICE — BW-400L DISP SNGL-END CLEANINGBRUSH: Brand: SINGLE USE SINGLE-ENDED CLEANING BRUSH

## (undated) DEVICE — TUBING, SUCTION, 1/4" X 12', STRAIGHT: Brand: MEDLINE